# Patient Record
Sex: FEMALE | Race: WHITE | Employment: OTHER | URBAN - METROPOLITAN AREA
[De-identification: names, ages, dates, MRNs, and addresses within clinical notes are randomized per-mention and may not be internally consistent; named-entity substitution may affect disease eponyms.]

---

## 2017-12-22 ENCOUNTER — HOSPITAL ENCOUNTER (OUTPATIENT)
Dept: OTHER | Age: 37
Discharge: HOME | End: 2017-12-22
Attending: INTERNAL MEDICINE | Admitting: INTERNAL MEDICINE

## 2017-12-22 LAB
ALBUMIN SERPL-MCNC: 4.2 GM/DL (ref 3.4–5)
ALP BLD-CCNC: 71 IU/L (ref 40–129)
ALT SERPL-CCNC: 42 U/L (ref 10–40)
AST SERPL-CCNC: 27 IU/L (ref 15–37)
BILIRUB SERPL-MCNC: 0.3 MG/DL (ref 0–1)
BILIRUBIN DIRECT: 0.2 MG/DL (ref 0–0.3)
BILIRUBIN, INDIRECT: 0.1 MG/DL (ref 0–0.7)
HEPATITIS B SURFACE ANTIGEN: NON REACTIVE
HEPATITIS C ANTIBODY: ABNORMAL
TOTAL PROTEIN: 7.9 GM/DL (ref 6.4–8.2)

## 2017-12-24 LAB — HIV SCREEN: NON REACTIVE

## 2018-05-29 ENCOUNTER — HOSPITAL ENCOUNTER (OUTPATIENT)
Dept: OTHER | Age: 38
Discharge: HOME | End: 2018-05-29
Attending: EMERGENCY MEDICINE | Admitting: EMERGENCY MEDICINE

## 2018-06-02 LAB
CULTURE: NORMAL
REPORT STATUS: NORMAL
REQUEST PROBLEM: NORMAL
SPECIMEN: NORMAL

## 2018-06-25 ENCOUNTER — HOSPITAL ENCOUNTER (OUTPATIENT)
Dept: OTHER | Age: 38
Discharge: HOME | End: 2018-06-25
Attending: INTERNAL MEDICINE | Admitting: INTERNAL MEDICINE

## 2018-06-25 LAB
ALBUMIN SERPL-MCNC: 4.5 GM/DL (ref 3.4–5)
ALP BLD-CCNC: 70 IU/L (ref 40–129)
ALT SERPL-CCNC: 24 U/L (ref 10–40)
AST SERPL-CCNC: 24 IU/L (ref 15–37)
BILIRUB SERPL-MCNC: 0.4 MG/DL (ref 0–1)
BILIRUBIN DIRECT: 0.2 MG/DL (ref 0–0.3)
BILIRUBIN, INDIRECT: 0.2 MG/DL (ref 0–0.7)
HEPATITIS B SURFACE ANTIGEN: NON REACTIVE
HEPATITIS C ANTIBODY: ABNORMAL
TOTAL PROTEIN: 8.3 GM/DL (ref 6.4–8.2)

## 2018-06-26 LAB — HIV SCREEN: NON REACTIVE

## 2018-08-29 ENCOUNTER — HOSPITAL ENCOUNTER (OUTPATIENT)
Dept: WOUND CARE | Age: 38
Discharge: HOME | End: 2018-08-29
Attending: SURGERY | Admitting: SURGERY

## 2018-08-29 VITALS
WEIGHT: 212 LBS | HEART RATE: 79 BPM | DIASTOLIC BLOOD PRESSURE: 73 MMHG | BODY MASS INDEX: 40.02 KG/M2 | SYSTOLIC BLOOD PRESSURE: 119 MMHG | RESPIRATION RATE: 16 BRPM | HEIGHT: 61 IN | TEMPERATURE: 97.6 F

## 2018-08-29 DIAGNOSIS — S81.801A NON-HEALING WOUND OF LOWER EXTREMITY, RIGHT, INITIAL ENCOUNTER: ICD-10-CM

## 2018-08-29 DIAGNOSIS — S81.802A NON-HEALING WOUND OF LOWER EXTREMITY, LEFT, INITIAL ENCOUNTER: ICD-10-CM

## 2018-08-29 PROBLEM — S81.809A NON-HEALING WOUND OF LOWER EXTREMITY: Status: ACTIVE | Noted: 2018-08-29

## 2018-08-29 PROCEDURE — 99203 OFFICE O/P NEW LOW 30 MIN: CPT | Performed by: SURGERY

## 2018-08-29 RX ORDER — CLONIDINE HYDROCHLORIDE 0.1 MG/1
0.1 TABLET ORAL 2 TIMES DAILY
COMMUNITY

## 2018-08-29 RX ORDER — MIRTAZAPINE 15 MG/1
15 TABLET, FILM COATED ORAL NIGHTLY
COMMUNITY

## 2018-08-29 RX ORDER — NALTREXONE HYDROCHLORIDE 50 MG/1
50 TABLET, FILM COATED ORAL DAILY
COMMUNITY
End: 2020-01-09

## 2018-08-29 ASSESSMENT — PAIN DESCRIPTION - LOCATION: LOCATION: ANKLE;LEG

## 2018-08-29 ASSESSMENT — PAIN DESCRIPTION - FREQUENCY: FREQUENCY: INTERMITTENT

## 2018-08-29 ASSESSMENT — PAIN DESCRIPTION - PAIN TYPE: TYPE: CHRONIC PAIN

## 2018-08-29 ASSESSMENT — PAIN SCALES - GENERAL: PAINLEVEL_OUTOF10: 8

## 2018-08-29 ASSESSMENT — PAIN DESCRIPTION - ORIENTATION: ORIENTATION: RIGHT;LEFT

## 2018-08-29 NOTE — PROGRESS NOTES
Hives    Darvocet [Propoxyphene N-Acetaminophen] Hives    Asa [Aspirin] Hives       MEDICATIONS    Current Outpatient Prescriptions on File Prior to Encounter   Medication Sig Dispense Refill    naproxen (NAPROSYN) 500 MG tablet Take 1 tablet by mouth 2 times daily 15 tablet 0    acetaminophen (APAP EXTRA STRENGTH) 500 MG tablet Take 1 tablet by mouth every 6 hours as needed for Pain 40 tablet 0     No current facility-administered medications on file prior to encounter. PROBLEM LIST    Patient Active Problem List   Diagnosis    Pneumonia    Abnormal echocardiogram    Cocaine abuse    Opioid abuse    Right shoulder pain    Endocarditis    Non-healing wound of lower extremity    Non-healing wound of lower extremity       REVIEW OF SYSTEMS    Pertinent items are noted in HPI. Objective:      /73   Pulse 79   Temp 97.6 °F (36.4 °C) (Temporal)   Resp 16   Ht 5' 1\" (1.549 m)   Wt 212 lb (96.2 kg)   BMI 40.06 kg/m²     PHYSICAL EXAM    Dermatologic exam: Visual inspection of the periwound reveals the skin to be edematous  Wound exam: see wound description below in procedure note      Assessment:       Werner Russ  appears to have a non-healing wound of the lower legs. The etiology of the wound is felt to be non-healing/non-surgical. There are multiple complicating factors including edema. A comprehensive wound management program would be helpful to heal this wound. Assessments completed include fall risk and nutritional, functional,and psychological status. At this time appropriate care would include: periodic debridement and wound care as below.        Plan:     Discharge instructions:  See belwo     Treatment Note      Written Patient Dismissal Instructions Given            Electronically signed by Yolis Loredo MD on 8/29/2018 at 1:40 PM

## 2018-08-29 NOTE — PLAN OF CARE
Problem: Wound:  Intervention: Assess ankle, calf, or foot circumference blilaterally  See flowsheet

## 2018-09-05 ENCOUNTER — HOSPITAL ENCOUNTER (OUTPATIENT)
Dept: WOUND CARE | Age: 38
Discharge: HOME | End: 2018-09-05
Attending: NURSE PRACTITIONER | Admitting: NURSE PRACTITIONER

## 2018-09-05 VITALS
TEMPERATURE: 97.6 F | DIASTOLIC BLOOD PRESSURE: 79 MMHG | SYSTOLIC BLOOD PRESSURE: 117 MMHG | HEART RATE: 64 BPM | RESPIRATION RATE: 18 BRPM

## 2018-09-05 DIAGNOSIS — L97.912 CHRONIC ULCER OF RIGHT LOWER EXTREMITY WITH FAT LAYER EXPOSED (HCC): ICD-10-CM

## 2018-09-05 DIAGNOSIS — S81.801D NON-HEALING WOUND OF LOWER EXTREMITY, RIGHT, SUBSEQUENT ENCOUNTER: ICD-10-CM

## 2018-09-05 DIAGNOSIS — S81.802D NON-HEALING WOUND OF LOWER EXTREMITY, LEFT, SUBSEQUENT ENCOUNTER: Primary | ICD-10-CM

## 2018-09-05 DIAGNOSIS — L97.922 CHRONIC ULCER OF LEFT LOWER EXTREMITY WITH FAT LAYER EXPOSED (HCC): ICD-10-CM

## 2018-09-05 PROCEDURE — 99213 OFFICE O/P EST LOW 20 MIN: CPT | Performed by: NURSE PRACTITIONER

## 2018-09-05 ASSESSMENT — PAIN DESCRIPTION - ONSET: ONSET: ON-GOING

## 2018-09-05 ASSESSMENT — PAIN SCALES - GENERAL: PAINLEVEL_OUTOF10: 10

## 2018-09-05 ASSESSMENT — PAIN DESCRIPTION - FREQUENCY: FREQUENCY: CONTINUOUS

## 2018-09-05 ASSESSMENT — PAIN DESCRIPTION - PROGRESSION: CLINICAL_PROGRESSION: NOT CHANGED

## 2018-09-05 ASSESSMENT — PAIN DESCRIPTION - ORIENTATION: ORIENTATION: LEFT;RIGHT

## 2018-09-05 ASSESSMENT — PAIN DESCRIPTION - LOCATION: LOCATION: LEG

## 2018-09-05 ASSESSMENT — PAIN DESCRIPTION - PAIN TYPE: TYPE: CHRONIC PAIN

## 2018-09-05 ASSESSMENT — PAIN DESCRIPTION - DESCRIPTORS: DESCRIPTORS: ACHING;SHOOTING;THROBBING

## 2018-09-05 NOTE — PROGRESS NOTES
Tunneling Position ___ O'Clock 00 9/5/2018 12:34 PM   Undermining Starts ___ O'Clock 0 9/5/2018 12:34 PM   Undermining Ends___ O'Clock 0 9/5/2018 12:34 PM   Undermining Maxium Distance (cm) 0 9/5/2018 12:34 PM   Wound Assessment Red;Yellow 9/5/2018 12:34 PM   Drainage Amount Moderate 9/5/2018 12:34 PM   Drainage Description Serosanguinous 9/5/2018 12:34 PM   Odor None 9/5/2018 12:34 PM   Margins Defined edges 9/5/2018 12:34 PM   Janet-wound Assessment Clean 9/5/2018 12:34 PM   Non-staged Wound Description Full thickness 9/5/2018 12:34 PM   [place]%Wound Bed 0 9/5/2018 12:34 PM   Red%Wound Bed 50 9/5/2018 12:34 PM   Yellow%Wound Bed 50 9/5/2018 12:34 PM   Black%Wound Bed 0 9/5/2018 12:34 PM   Purple%Wound Bed 0 9/5/2018 12:34 PM   Other%Wound Bed 0 9/5/2018 12:34 PM   Number of days: 7       Wound 08/29/18 #2 RIght medial leg proximal(onset april 2018) (Active)   Wound Image   8/29/2018  1:12 PM   Wound Type Wound 9/5/2018 12:34 PM   Wound Traumatic 8/29/2018  1:12 PM   Dressing Status Clean;Dry; Intact 9/5/2018  1:34 PM   Dressing Changed Changed/New 9/5/2018  1:34 PM   Wound Cleansed Rinsed/Irrigated with saline 9/5/2018 12:34 PM   Wound Length (cm) 0.2 cm 9/5/2018 12:34 PM   Wound Width (cm) 0.2 cm 9/5/2018 12:34 PM   Wound Depth (cm)  0.1 9/5/2018 12:34 PM   Calculated Wound Size (cm^2) (l*w) 0.04 cm^2 9/5/2018 12:34 PM   Change in Wound Size % (l*w) 95.6 9/5/2018 12:34 PM   Distance Tunneling (cm) 0 cm 9/5/2018 12:34 PM   Tunneling Position ___ O'Clock 0 9/5/2018 12:34 PM   Undermining Starts ___ O'Clock 00 9/5/2018 12:34 PM   Undermining Ends___ O'Clock 0 9/5/2018 12:34 PM   Undermining Maxium Distance (cm) 0 9/5/2018 12:34 PM   Wound Assessment Red 9/5/2018 12:34 PM   Drainage Amount None 9/5/2018 12:34 PM   Drainage Description Serosanguinous; Yellow 8/29/2018  1:12 PM   Odor None 9/5/2018 12:34 PM   Margins Attached edges 9/5/2018 12:34 PM   Janet-wound Assessment Clean 9/5/2018 12:34 PM   Non-staged Wound 9/5/2018  1:34 PM   Dressing Changed Changed/New 9/5/2018  1:34 PM   Wound Cleansed Rinsed/Irrigated with saline 9/5/2018 12:34 PM   Wound Length (cm) 0.1 cm 9/5/2018 12:34 PM   Wound Width (cm) 0.2 cm 9/5/2018 12:34 PM   Wound Depth (cm)  0.1 9/5/2018 12:34 PM   Calculated Wound Size (cm^2) (l*w) 0.02 cm^2 9/5/2018 12:34 PM   Change in Wound Size % (l*w) 92.86 9/5/2018 12:34 PM   Distance Tunneling (cm) 0 cm 9/5/2018 12:34 PM   Tunneling Position ___ O'Clock 0 9/5/2018 12:34 PM   Undermining Starts ___ O'Clock 0 9/5/2018 12:34 PM   Undermining Ends___ O'Clock 0 9/5/2018 12:34 PM   Undermining Maxium Distance (cm) 0 9/5/2018 12:34 PM   Wound Assessment Yellow 9/5/2018 12:34 PM   Drainage Amount None 9/5/2018 12:34 PM   Drainage Description DARCY 9/5/2018 12:34 PM   Odor None 9/5/2018 12:34 PM   Margins Defined edges 9/5/2018 12:34 PM   Janet-wound Assessment Pink 9/5/2018 12:34 PM   Non-staged Wound Description Full thickness 9/5/2018 12:34 PM   [place]%Wound Bed 0 9/5/2018 12:34 PM   Red%Wound Bed 0 9/5/2018 12:34 PM   Yellow%Wound Bed 100 9/5/2018 12:34 PM   Black%Wound Bed 0 9/5/2018 12:34 PM   Purple%Wound Bed 0 9/5/2018 12:34 PM   Other%Wound Bed 0 9/5/2018 12:34 PM   Number of days: 7       Incision 02/27/15 Shoulder Right (Active)   Number of days: 1285       Assessment:     Problem List Items Addressed This Visit     Non-healing wound of lower extremity - Primary    Non-healing wound of lower extremity    WD-Chronic ulcer of right lower extremity with fat layer exposed ([address]    Relevant Orders    Culture, Generic    WD-Chronic ulcer of left lower extremity with fat layer exposed ([address]          Status of wound progress and description from last visit:   Slightly smaller in size. Plan:     Discharge Instructions            PHYSICIAN ORDERS AND DISCHARGE INSTRUCTIONS     NOTE: Upon discharge from the [address], you will receive a patient experience survey.  We would be grateful if you would take the time to fill this survey out.     Wound care order history:                 NASH's   Right      Left    Date               Vascular studies:   Date               Imaging:   Date               Cultures:   Date 7/13/18 9/5/18              Labs/ HbA1c:   Date               Grafts:  Date               HBO:               Antibiotics:               Earlier Wound care treatments:                Authorizations:                Consults:   Date                           Primary care physician:      Continuing wound care orders and information:              Residence: HOME              Continue home health care with:               Your wound-care supplies will be provided by: . HARRISON OREILLY ORDERED 8/29/18 HAS SUPPLIES              DME provider:              Compression with              Off loading:  Date               Wound Medications:              Wound cleansing:                           Do not scrub or use excessive force. Wash hands with soap and water before and after dressing changes. Prior to applying a clean dressing, cleanse wound with normal saline,                                wound cleanser, or mild soap and water. Ask the physician or nurse before getting the wound(s) wet in a shower              Daily Wound management:                          Keep weight off wounds and reposition every 2 hours. Avoid standing for long periods of time. Apply wraps/stockings in AM and remove at bedtime. If swelling is present, elevate legs to the level of the heart or above for 30                              minutes 4-5 times a day and/or when sitting.                                              When taking antibiotics take entire prescription as ordered by physician                             do not stop taking until medicine is all gone.

## 2018-09-09 LAB
CULTURE: NORMAL
CULTURE: NORMAL
Lab: NORMAL
ORGANISM: NORMAL
REPORT STATUS: NORMAL
SPECIMEN: NORMAL

## 2018-09-12 ENCOUNTER — HOSPITAL ENCOUNTER (OUTPATIENT)
Dept: WOUND CARE | Age: 38
Discharge: HOME | End: 2018-09-12
Attending: NURSE PRACTITIONER | Admitting: NURSE PRACTITIONER

## 2018-09-12 VITALS
HEART RATE: 73 BPM | SYSTOLIC BLOOD PRESSURE: 132 MMHG | TEMPERATURE: 97.3 F | DIASTOLIC BLOOD PRESSURE: 91 MMHG | RESPIRATION RATE: 20 BRPM

## 2018-09-12 DIAGNOSIS — L97.912 CHRONIC ULCER OF RIGHT LOWER EXTREMITY WITH FAT LAYER EXPOSED (HCC): Primary | ICD-10-CM

## 2018-09-12 DIAGNOSIS — L97.922 CHRONIC ULCER OF LEFT LOWER EXTREMITY WITH FAT LAYER EXPOSED (HCC): ICD-10-CM

## 2018-09-12 PROCEDURE — 99213 OFFICE O/P EST LOW 20 MIN: CPT | Performed by: NURSE PRACTITIONER

## 2018-09-12 RX ORDER — SULFAMETHOXAZOLE AND TRIMETHOPRIM 800; 160 MG/1; MG/1
1 TABLET ORAL 2 TIMES DAILY
Qty: 20 TABLET | Refills: 0 | Status: SHIPPED | OUTPATIENT
Start: 2018-09-12 | End: 2018-09-22

## 2018-09-12 ASSESSMENT — PAIN DESCRIPTION - LOCATION: LOCATION: LEG

## 2018-09-12 ASSESSMENT — PAIN DESCRIPTION - FREQUENCY: FREQUENCY: CONTINUOUS

## 2018-09-12 ASSESSMENT — PAIN DESCRIPTION - ORIENTATION: ORIENTATION: RIGHT

## 2018-09-12 ASSESSMENT — PAIN DESCRIPTION - ONSET: ONSET: ON-GOING

## 2018-09-12 ASSESSMENT — PAIN DESCRIPTION - PAIN TYPE: TYPE: ACUTE PAIN

## 2018-09-12 ASSESSMENT — PAIN DESCRIPTION - DESCRIPTORS: DESCRIPTORS: SHARP;DULL

## 2018-09-12 ASSESSMENT — PAIN DESCRIPTION - PROGRESSION: CLINICAL_PROGRESSION: NOT CHANGED

## 2018-09-12 ASSESSMENT — PAIN SCALES - GENERAL: PAINLEVEL_OUTOF10: 10

## 2018-09-12 NOTE — PROGRESS NOTES
Wound Care Center Progress Note       Brantley Councilman  AGE: 41 y.o. GENDER: female  : 1980  TODAY'S DATE:  2018        Subjective:     Chief Complaint   Patient presents with    Wound Check     ble          HISTORY of PRESENT ILLNESS     Brantley Councilman is a 41 y.o. female who presents today for wound evaluation of Chronic non-healing/non-surgical wound(s) of bilateral lower legs. The wound is of moderate severity. 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 Wounds extend to fat layer. Wound culture from 18 with moderate growth staph. Will start on Bactrim per sensitivity report. All wounds are healed except for right medial ankle.   Wound Pain Timing/Severity: intermittent  Quality of pain: tender  Severity of pain:  3 / 10   Modifying Factors: edema and obesity  Associated Signs/Symptoms: pain        PAST MEDICAL HISTORY        Diagnosis Date    Hep C w/o coma, chronic (HCC)     Heroin dependence ([address]     MRSA infection        PAST SURGICAL HISTORY    Past Surgical History:   Procedure Laterality Date    CHOLECYSTECTOMY      CYST INCISION AND DRAINAGE      numerous places 6 different areas    OTHER SURGICAL HISTORY  02/27/15    I&D of R shoulder AC joint    SHOULDER SURGERY Right     TOE SURGERY      were amputated in accident and sewn back on    TONSILLECTOMY         FAMILY HISTORY    Family History   Problem Relation Age of Onset    Heart Disease Mother     Asthma Mother     Cancer Maternal Grandmother     Heart Disease Maternal Grandmother     Asthma Maternal Grandmother        SOCIAL HISTORY    Social History   Substance Use Topics    Smoking status: Current Every Day Smoker     Packs/day: 1.00     Types: Cigarettes    Smokeless tobacco: Never Used    Alcohol use No       ALLERGIES    Allergies   Allergen Reactions    Latex Hives    Darvocet [Propoxyphene N-Acetaminophen] Hives    Asa [Aspirin] Hives       MEDICATIONS    Current Outpatient Prescriptions on File Prior to Encounter   Medication Sig Dispense Refill    cloNIDine (CATAPRES) 0.1 MG tablet Take 0.1 mg by mouth 2 times daily      mirtazapine (REMERON) 15 MG tablet Take 15 mg by mouth nightly      naltrexone (DEPADE) 50 MG tablet Take 50 mg by mouth daily      Naltrexone (VIVITROL IM) Inject into the muscle every 30 days      naproxen (NAPROSYN) 500 MG tablet Take 1 tablet by mouth 2 times daily 15 tablet 0    acetaminophen (APAP EXTRA STRENGTH) 500 MG tablet Take 1 tablet by mouth every 6 hours as needed for Pain 40 tablet 0     No current facility-administered medications on file prior to encounter. REVIEW OF SYSTEMS    Pertinent items are noted in HPI. Constitutional: Negative for systemic symptoms including fever, chills and malaise. Objective:      BP (!) 132/91   Pulse 73   Temp 97.3 °F (36.3 °C) (Temporal)   Resp 20     PHYSICAL EXAM    General: The patient is in no acute distress. Mental status:  Patient is appropriate, is  oriented to place and plan of care. Dermatologic exam: Visual inspection of the periwound reveals the skin to be edematous. Wound exam:  see wound description below     All active wounds listed below with today's date are evaluated      Wound 08/29/18 #1 right medial ankle(onset april 2018) trauma (Active)   Wound Image   9/12/2018 12:30 PM   Wound Type Wound 9/12/2018 12:30 PM   Wound Traumatic 9/12/2018 12:30 PM   Dressing Status Clean;Dry; Intact 9/5/2018  1:34 PM   Dressing Changed Changed/New 9/5/2018  1:34 PM   Wound Cleansed Rinsed/Irrigated with saline 9/12/2018 12:30 PM   Wound Length (cm) 2.4 cm 9/12/2018 12:30 PM   Wound Width (cm) 1.5 cm 9/12/2018 12:30 PM   Wound Depth (cm)  0.1 9/12/2018 12:30 PM   Calculated Wound Size (cm^2) (l*w) 3.6 cm^2 9/12/2018 12:30 PM   Change in Wound Size % (l*w) 47.83 9/12/2018 12:30 PM   Distance Tunneling (cm) 0 cm 9/12/2018 12:30 PM   Tunneling Position ___ O'Clock 0 9/12/2018 12:30 PM 1:34 PM   Dressing Changed Changed/New 9/5/2018  1:34 PM   Wound Cleansed Rinsed/Irrigated with saline 9/12/2018 12:30 PM   Wound Length (cm) 0 cm 9/12/2018 12:30 PM   Wound Width (cm) 0 cm 9/12/2018 12:30 PM   Wound Depth (cm)  0 9/12/2018 12:30 PM   Calculated Wound Size (cm^2) (l*w) 0 cm^2 9/12/2018 12:30 PM   Change in Wound Size % (l*w) 100 9/12/2018 12:30 PM   Distance Tunneling (cm) 0 cm 9/12/2018 12:30 PM   Tunneling Position ___ O'Clock 0 9/12/2018 12:30 PM   Undermining Starts ___ O'Clock 0 9/12/2018 12:30 PM   Undermining Ends___ O'Clock 0 9/12/2018 12:30 PM   Undermining Maxium Distance (cm) 0 9/12/2018 12:30 PM   Wound Assessment Pink 9/12/2018 12:30 PM   Drainage Amount None 9/12/2018 12:30 PM   Drainage Description DARCY 9/5/2018 12:34 PM   Odor None 9/12/2018 12:30 PM   Margins Attached edges 9/12/2018 12:30 PM   Janet-wound Assessment Clean;Dry; Intact 9/12/2018 12:30 PM   Non-staged Wound Description Not applicable 3/07/1084 61:87 PM   Melissa%Wound Bed 100 9/12/2018 12:30 PM   Red%Wound Bed 0 9/12/2018 12:30 PM   Yellow%Wound Bed 0 9/12/2018 12:30 PM   Black%Wound Bed 0 9/12/2018 12:30 PM   Purple%Wound Bed 0 9/12/2018 12:30 PM   Other%Wound Bed 0 9/12/2018 12:30 PM   Number of days: 13       Incision 02/27/15 Shoulder Right (Active)   Number of days: 1292       Assessment:       Problem List Items Addressed This Visit     WD-Chronic ulcer of right lower extremity with fat layer exposed ([facility] - Primary    WD-Chronic ulcer of left lower extremity with fat layer exposed ([address]          Status of wound progress and description from last visit:  Improved        Plan:     Discharge Instructions         Instructions               PHYSICIAN ORDERS AND DISCHARGE INSTRUCTIONS     NOTE: Upon discharge from the [address], you will receive a patient experience survey.  We would be grateful if you would take the time to fill this survey out.     Wound care order history:                 NASH's   Right      Left week:  9/12/18    TAKE IBUPROFEN AND TYLENOL FOR PAIN     ORDER FOR LABS HEMOGLOBIN A1C GIVEN ON 9/5/18         RIGHT LOWER LEG- CLEAN WITH SOAP AND WATER. DRY WOUNDS APPLY ZANDRA ABD KERLIX TAPE  TUBIGRIP F CHANGE DRESSINGS DAILY   ( AT HOME USE STIMULEN POWDER AND ZANDRA )       LEFT LOWER LEG WOUND, RIGHT POSTERIOR LEG AND RIGHT  MEDIAL LEG WOUNDS- 9/12/18 TAKE HEALED PHOTO  APPLY TUBIGRIP F DAILY            Follow up with Dr Clarita Johns [facility] in the wound care center  Call [phone] for any questions or concerns.   Date__________   Time____________                       Treatment Note      Written Patient Dismissal Instructions Given            Electronically signed by REMY Pearce CNP on 9/12/2018 at 12:57 PM

## 2018-11-06 ENCOUNTER — HOSPITAL ENCOUNTER (EMERGENCY)
Age: 38
Discharge: HOME | End: 2018-11-06
Payer: MEDICAID

## 2018-11-06 VITALS
BODY MASS INDEX: 42.1 KG/M2 | RESPIRATION RATE: 18 BRPM | HEART RATE: 87 BPM | DIASTOLIC BLOOD PRESSURE: 84 MMHG | OXYGEN SATURATION: 99 % | HEIGHT: 61 IN | SYSTOLIC BLOOD PRESSURE: 136 MMHG | TEMPERATURE: 98 F | WEIGHT: 223 LBS

## 2018-11-06 DIAGNOSIS — R51.9 NONINTRACTABLE HEADACHE, UNSPECIFIED CHRONICITY PATTERN, UNSPECIFIED HEADACHE TYPE: Primary | ICD-10-CM

## 2018-11-06 DIAGNOSIS — R10.9 ABDOMINAL PAIN, UNSPECIFIED ABDOMINAL LOCATION: ICD-10-CM

## 2018-11-06 LAB
ALBUMIN SERPL-MCNC: 3.5 GM/DL (ref 3.4–5)
ALP BLD-CCNC: 59 IU/L (ref 40–129)
ALT SERPL-CCNC: 17 U/L (ref 10–40)
AMORPHOUS: ABNORMAL /HPF
ANION GAP SERPL CALCULATED.3IONS-SCNC: 10 MMOL/L (ref 4–16)
AST SERPL-CCNC: 14 IU/L (ref 15–37)
BACTERIA: ABNORMAL /HPF
BASOPHILS ABSOLUTE: 0 K/CU MM
BASOPHILS RELATIVE PERCENT: 0.4 % (ref 0–1)
BILIRUB SERPL-MCNC: 0.6 MG/DL (ref 0–1)
BILIRUBIN URINE: NEGATIVE MG/DL
BLOOD, URINE: NEGATIVE
BUN BLDV-MCNC: 17 MG/DL (ref 6–23)
CALCIUM SERPL-MCNC: 9 MG/DL (ref 8.3–10.6)
CHLORIDE BLD-SCNC: 103 MMOL/L (ref 99–110)
CLARITY: ABNORMAL
CO2: 26 MMOL/L (ref 21–32)
COLOR: YELLOW
CREAT SERPL-MCNC: 1 MG/DL (ref 0.6–1.1)
DIFFERENTIAL TYPE: ABNORMAL
EOSINOPHILS ABSOLUTE: 0.3 K/CU MM
EOSINOPHILS RELATIVE PERCENT: 2.7 % (ref 0–3)
GFR AFRICAN AMERICAN: >60 ML/MIN/1.73M2
GFR NON-AFRICAN AMERICAN: >60 ML/MIN/1.73M2
GLUCOSE BLD-MCNC: 91 MG/DL (ref 70–99)
GLUCOSE, URINE: NEGATIVE MG/DL
HCG QUALITATIVE: NEGATIVE
HCT VFR BLD CALC: 40.8 % (ref 37–47)
HEMOGLOBIN: 13.2 GM/DL (ref 12.5–16)
IMMATURE NEUTROPHIL %: 0.4 % (ref 0–0.43)
KETONES, URINE: NEGATIVE MG/DL
LEUKOCYTE ESTERASE, URINE: ABNORMAL
LYMPHOCYTES ABSOLUTE: 1.9 K/CU MM
LYMPHOCYTES RELATIVE PERCENT: 17.9 % (ref 24–44)
MCH RBC QN AUTO: 30.6 PG (ref 27–31)
MCHC RBC AUTO-ENTMCNC: 32.4 % (ref 32–36)
MCV RBC AUTO: 94.4 FL (ref 78–100)
MONOCYTES ABSOLUTE: 0.7 K/CU MM
MONOCYTES RELATIVE PERCENT: 6.3 % (ref 0–4)
MUCUS: ABNORMAL HPF
NITRITE URINE, QUANTITATIVE: NEGATIVE
NUCLEATED RBC %: 0 %
PDW BLD-RTO: 13.1 % (ref 11.7–14.9)
PH, URINE: 7 (ref 5–8)
PLATELET # BLD: 223 K/CU MM (ref 140–440)
PMV BLD AUTO: 9.4 FL (ref 7.5–11.1)
POTASSIUM SERPL-SCNC: 4.3 MMOL/L (ref 3.5–5.1)
PROTEIN UA: NEGATIVE MG/DL
RBC # BLD: 4.32 M/CU MM (ref 4.2–5.4)
RBC URINE: 2 /HPF (ref 0–6)
SEGMENTED NEUTROPHILS ABSOLUTE COUNT: 7.5 K/CU MM
SEGMENTED NEUTROPHILS RELATIVE PERCENT: 72.3 % (ref 36–66)
SODIUM BLD-SCNC: 139 MMOL/L (ref 135–145)
SPECIFIC GRAVITY UA: 1.02 (ref 1–1.03)
SQUAMOUS EPITHELIAL: 8 /HPF
TOTAL IMMATURE NEUTOROPHIL: 0.04 K/CU MM
TOTAL NUCLEATED RBC: 0 K/CU MM
TOTAL PROTEIN: 7.6 GM/DL (ref 6.4–8.2)
TRICHOMONAS: ABNORMAL /HPF
UROBILINOGEN, URINE: NORMAL MG/DL (ref 0.2–1)
WBC # BLD: 10.4 K/CU MM (ref 4–10.5)
WBC UA: 1 /HPF (ref 0–5)

## 2018-11-06 PROCEDURE — 80053 COMPREHEN METABOLIC PANEL: CPT

## 2018-11-06 PROCEDURE — 96372 THER/PROPH/DIAG INJ SC/IM: CPT

## 2018-11-06 PROCEDURE — 81001 URINALYSIS AUTO W/SCOPE: CPT

## 2018-11-06 PROCEDURE — 6370000000 HC RX 637 (ALT 250 FOR IP): Performed by: PHYSICIAN ASSISTANT

## 2018-11-06 PROCEDURE — 36415 COLL VENOUS BLD VENIPUNCTURE: CPT

## 2018-11-06 PROCEDURE — 99284 EMERGENCY DEPT VISIT MOD MDM: CPT

## 2018-11-06 PROCEDURE — 6360000002 HC RX W HCPCS: Performed by: PHYSICIAN ASSISTANT

## 2018-11-06 PROCEDURE — 85025 COMPLETE CBC W/AUTO DIFF WBC: CPT

## 2018-11-06 PROCEDURE — 84703 CHORIONIC GONADOTROPIN ASSAY: CPT

## 2018-11-06 RX ORDER — DIPHENHYDRAMINE HCL 25 MG
50 TABLET ORAL ONCE
Status: COMPLETED | OUTPATIENT
Start: 2018-11-06 | End: 2018-11-06

## 2018-11-06 RX ORDER — METOCLOPRAMIDE HYDROCHLORIDE 5 MG/ML
10 INJECTION INTRAMUSCULAR; INTRAVENOUS ONCE
Status: COMPLETED | OUTPATIENT
Start: 2018-11-06 | End: 2018-11-06

## 2018-11-06 RX ORDER — BUTALBITAL, ACETAMINOPHEN AND CAFFEINE 300; 40; 50 MG/1; MG/1; MG/1
1 CAPSULE ORAL EVERY 6 HOURS PRN
Qty: 12 CAPSULE | Refills: 0 | Status: SHIPPED | OUTPATIENT
Start: 2018-11-06 | End: 2019-06-20

## 2018-11-06 RX ADMIN — DIPHENHYDRAMINE HCL 50 MG: 25 TABLET ORAL at 13:36

## 2018-11-06 RX ADMIN — METOCLOPRAMIDE 10 MG: 5 INJECTION, SOLUTION INTRAMUSCULAR; INTRAVENOUS at 13:36

## 2018-11-06 ASSESSMENT — PAIN SCALES - GENERAL
PAINLEVEL_OUTOF10: 6
PAINLEVEL_OUTOF10: 10

## 2018-11-06 ASSESSMENT — PAIN DESCRIPTION - PAIN TYPE: TYPE: ACUTE PAIN

## 2018-11-06 ASSESSMENT — PAIN DESCRIPTION - LOCATION: LOCATION: HEAD

## 2018-11-06 NOTE — ED PROVIDER NOTES
eMERGENCY dEPARTMENT eNCOUnter      PCP: Arlyn Sharpe MD    CHIEF COMPLAINT    Chief Complaint   Patient presents with    Headache     x 2 days    Abdominal Pain     sharp x 3 days       HPI    Ryan Rhodes is a 41 y.o. female who arrives to the ED today via POV with gradual onset of a headache occurring at rest since onset 2 days. .   Pain is localized in the Frontal of the head. The duration has been since onset, with varying degrees of severity. The quality of the headache is achy. This headache is not the worst headache of the patient's life. The patient has no associated vomiting, neck pain or stiffness, loss of consciousness, altered mentation (confusion, memory loss, hallucinations, vision changes), weakness or sensory loss. Patient has a history of headaches, greater than 3 headaches in the last 6 months, and denies any new symptoms or severity of symptoms different from previous headaches. Patient also complains of right upper quadrant abdominal pain. Onset 4-5 days. Context is she notes insidious onset of episodic pain described as achy. No known aggravating or alleviating factors. No chest pain or shortness of breath. No change in nature with food intake. No vomiting, diarrhea. No urinary symptoms. Denies pregnancy. REVIEW OF SYSTEMS   Constitutional:  Denies fever, chills, weight loss or weakness   Neurologic:  See HPI. Denies lightheadedness, dizziness. Eyes:  See HPI. Denies discharge, diplopia, blurred vision, or loss visual field  HENT:  Denies sore throat or ear pain   GI  see HPI. :  Denies Dysuria or Hematuria. Adamantly denies pregnancy  Musculoskeletal:  Denies back pain.      Skin:  Denies rash   Endocrine:  Denies polyuria or polydypsia   Lymphatic:  Denies swollen glands   Psychiatric:  Denies depression, suicidal ideation or homicidal ideation     All other review of systems negative at this time  See HPI and nursing notes for additional 32.4 32.0 - 36.0 %    RDW 13.1 11.7 - 14.9 %    Platelets 510 848 - 652 K/CU MM    MPV 9.4 7.5 - 11.1 FL    Differential Type AUTOMATED DIFFERENTIAL     Segs Relative 72.3 (H) 36 - 66 %    Lymphocytes % 17.9 (L) 24 - 44 %    Monocytes % 6.3 (H) 0 - 4 %    Eosinophils % 2.7 0 - 3 %    Basophils % 0.4 0 - 1 %    Segs Absolute 7.5 K/CU MM    Lymphocytes # 1.9 K/CU MM    Monocytes # 0.7 K/CU MM    Eosinophils # 0.3 K/CU MM    Basophils # 0.0 K/CU MM    Nucleated RBC % 0.0 %    Total Nucleated RBC 0.0 K/CU MM    Total Immature Neutrophil 0.04 K/CU MM    Immature Neutrophil % 0.4 0 - 0.43 %   Comprehensive Metabolic Panel   Result Value Ref Range    Sodium 139 135 - 145 MMOL/L    Potassium 4.3 3.5 - 5.1 MMOL/L    Chloride 103 99 - 110 mMol/L    CO2 26 21 - 32 MMOL/L    BUN 17 6 - 23 MG/DL    CREATININE 1.0 0.6 - 1.1 MG/DL    Glucose 91 70 - 99 MG/DL    Calcium 9.0 8.3 - 10.6 MG/DL    Alb 3.5 3.4 - 5.0 GM/DL    Total Protein 7.6 6.4 - 8.2 GM/DL    Total Bilirubin 0.6 0.0 - 1.0 MG/DL    ALT 17 10 - 40 U/L    AST 14 (L) 15 - 37 IU/L    Alkaline Phosphatase 59 40 - 129 IU/L    GFR Non-African American >60 >60 mL/min/1.73m2    GFR African American >60 >60 mL/min/1.73m2    Anion Gap 10 4 - 16   HCG Qualitative, Serum   Result Value Ref Range    hCG Qual NEGATIVE    Urinalysis   Result Value Ref Range    Color, UA YELLOW UYELL    Clarity, UA HAZY (A) CLEAR    Glucose, Urine NEGATIVE NEG MG/DL    Bilirubin Urine NEGATIVE NEG MG/DL    Ketones, Urine NEGATIVE NEG MG/DL    Specific Gravity, UA 1.017 1.001 - 1.035    Blood, Urine NEGATIVE NEG    pH, Urine 7.0 5.0 - 8.0    Protein, UA NEGATIVE NEG MG/DL    Urobilinogen, Urine NORMAL 0.2 - 1.0 MG/DL    Nitrite Urine, Quantitative NEGATIVE NEG    Leukocyte Esterase, Urine TRACE (A) NEG    RBC, UA 2 0 - 6 /HPF    WBC, UA 1 0 - 5 /HPF    Bacteria, UA RARE (A) NEG /HPF    Squam Epithel, UA 8 /HPF    Mucus, UA RARE (A) NEG HPF    Trichomonas, UA NONE SEEN NOSEE /HPF    Amorphous, UA RARE

## 2019-01-10 ENCOUNTER — HOSPITAL ENCOUNTER (OUTPATIENT)
Dept: NEUROLOGY | Age: 39
Discharge: HOME | End: 2019-01-10
Payer: MEDICAID

## 2019-01-10 PROCEDURE — 95861 NEEDLE EMG 2 EXTREMITIES: CPT

## 2019-03-05 ASSESSMENT — PAIN DESCRIPTION - ORIENTATION: ORIENTATION: RIGHT;LEFT

## 2019-03-05 ASSESSMENT — PAIN SCALES - GENERAL: PAINLEVEL_OUTOF10: 10

## 2019-03-05 ASSESSMENT — PAIN DESCRIPTION - LOCATION: LOCATION: EAR

## 2019-03-05 ASSESSMENT — PAIN DESCRIPTION - PAIN TYPE: TYPE: ACUTE PAIN

## 2019-03-06 ENCOUNTER — HOSPITAL ENCOUNTER (EMERGENCY)
Age: 39
Discharge: HOME | End: 2019-03-06
Payer: MEDICAID

## 2019-03-06 VITALS
OXYGEN SATURATION: 95 % | SYSTOLIC BLOOD PRESSURE: 121 MMHG | BODY MASS INDEX: 44.17 KG/M2 | DIASTOLIC BLOOD PRESSURE: 62 MMHG | RESPIRATION RATE: 18 BRPM | TEMPERATURE: 98.4 F | HEART RATE: 93 BPM | HEIGHT: 60 IN | WEIGHT: 225 LBS

## 2019-03-06 DIAGNOSIS — G43.909 MIGRAINE WITHOUT STATUS MIGRAINOSUS, NOT INTRACTABLE, UNSPECIFIED MIGRAINE TYPE: Primary | ICD-10-CM

## 2019-03-06 DIAGNOSIS — Z86.19 H/O TRICHOMONAL VAGINITIS: ICD-10-CM

## 2019-03-06 PROCEDURE — 6370000000 HC RX 637 (ALT 250 FOR IP): Performed by: PHYSICIAN ASSISTANT

## 2019-03-06 PROCEDURE — 2580000003 HC RX 258: Performed by: PHYSICIAN ASSISTANT

## 2019-03-06 PROCEDURE — 6360000002 HC RX W HCPCS: Performed by: PHYSICIAN ASSISTANT

## 2019-03-06 PROCEDURE — 99283 EMERGENCY DEPT VISIT LOW MDM: CPT

## 2019-03-06 PROCEDURE — 96375 TX/PRO/DX INJ NEW DRUG ADDON: CPT

## 2019-03-06 PROCEDURE — 96374 THER/PROPH/DIAG INJ IV PUSH: CPT

## 2019-03-06 RX ORDER — LORAZEPAM 2 MG/ML
1 INJECTION INTRAMUSCULAR ONCE
Status: DISCONTINUED | OUTPATIENT
Start: 2019-03-06 | End: 2019-03-06

## 2019-03-06 RX ORDER — KETOROLAC TROMETHAMINE 30 MG/ML
30 INJECTION, SOLUTION INTRAMUSCULAR; INTRAVENOUS ONCE
Status: COMPLETED | OUTPATIENT
Start: 2019-03-06 | End: 2019-03-06

## 2019-03-06 RX ORDER — BUTALBITAL, ACETAMINOPHEN AND CAFFEINE 50; 325; 40 MG/1; MG/1; MG/1
2 TABLET ORAL ONCE
Status: COMPLETED | OUTPATIENT
Start: 2019-03-06 | End: 2019-03-06

## 2019-03-06 RX ORDER — DIPHENHYDRAMINE HCL 25 MG
50 TABLET ORAL ONCE
Status: COMPLETED | OUTPATIENT
Start: 2019-03-06 | End: 2019-03-06

## 2019-03-06 RX ORDER — KETOROLAC TROMETHAMINE 30 MG/ML
60 INJECTION, SOLUTION INTRAMUSCULAR; INTRAVENOUS ONCE
Status: DISCONTINUED | OUTPATIENT
Start: 2019-03-06 | End: 2019-03-06

## 2019-03-06 RX ORDER — 0.9 % SODIUM CHLORIDE 0.9 %
1000 INTRAVENOUS SOLUTION INTRAVENOUS ONCE
Status: COMPLETED | OUTPATIENT
Start: 2019-03-06 | End: 2019-03-06

## 2019-03-06 RX ORDER — METRONIDAZOLE 500 MG/1
500 TABLET ORAL 3 TIMES DAILY
Qty: 21 TABLET | Refills: 0 | Status: SHIPPED | OUTPATIENT
Start: 2019-03-06 | End: 2019-03-13

## 2019-03-06 RX ADMIN — KETOROLAC TROMETHAMINE 30 MG: 30 INJECTION, SOLUTION INTRAMUSCULAR at 03:07

## 2019-03-06 RX ADMIN — SODIUM CHLORIDE 1000 ML: 9 INJECTION, SOLUTION INTRAVENOUS at 03:08

## 2019-03-06 RX ADMIN — PROCHLORPERAZINE EDISYLATE 10 MG: 5 INJECTION INTRAMUSCULAR; INTRAVENOUS at 03:07

## 2019-03-06 RX ADMIN — BUTALBITAL, ACETAMINOPHEN, AND CAFFEINE 2 TABLET: 50; 325; 40 TABLET ORAL at 03:08

## 2019-03-06 RX ADMIN — DIPHENHYDRAMINE HCL 50 MG: 25 TABLET ORAL at 03:08

## 2019-03-06 ASSESSMENT — PAIN SCALES - GENERAL
PAINLEVEL_OUTOF10: 10
PAINLEVEL_OUTOF10: 0

## 2019-04-19 ENCOUNTER — HOSPITAL ENCOUNTER (OUTPATIENT)
Dept: PSYCHIATRY | Age: 39
Setting detail: THERAPIES SERIES
Discharge: HOME | End: 2019-04-19
Payer: MEDICAID

## 2019-04-19 PROCEDURE — 80305 DRUG TEST PRSMV DIR OPT OBS: CPT

## 2019-04-19 PROCEDURE — 90791 PSYCH DIAGNOSTIC EVALUATION: CPT

## 2019-04-26 ENCOUNTER — HOSPITAL ENCOUNTER (OUTPATIENT)
Dept: PSYCHIATRY | Age: 39
Setting detail: THERAPIES SERIES
Discharge: HOME | End: 2019-04-26
Payer: MEDICAID

## 2019-04-26 PROCEDURE — 90834 PSYTX W PT 45 MINUTES: CPT

## 2019-05-01 ENCOUNTER — HOSPITAL ENCOUNTER (OUTPATIENT)
Dept: PSYCHIATRY | Age: 39
Setting detail: THERAPIES SERIES
Discharge: HOME | End: 2019-05-01
Payer: MEDICAID

## 2019-05-01 PROCEDURE — 90853 GROUP PSYCHOTHERAPY: CPT

## 2019-05-03 ENCOUNTER — HOSPITAL ENCOUNTER (OUTPATIENT)
Dept: PSYCHIATRY | Age: 39
Setting detail: THERAPIES SERIES
Discharge: HOME | End: 2019-05-03
Payer: MEDICAID

## 2019-05-03 PROCEDURE — 90834 PSYTX W PT 45 MINUTES: CPT

## 2019-05-08 ENCOUNTER — APPOINTMENT (OUTPATIENT)
Dept: PSYCHIATRY | Age: 39
End: 2019-05-08
Payer: MEDICAID

## 2019-05-10 ENCOUNTER — HOSPITAL ENCOUNTER (OUTPATIENT)
Dept: PSYCHIATRY | Age: 39
Setting detail: THERAPIES SERIES
Discharge: HOME | End: 2019-05-10
Payer: MEDICAID

## 2019-05-10 PROCEDURE — 90834 PSYTX W PT 45 MINUTES: CPT

## 2019-05-10 PROCEDURE — 80305 DRUG TEST PRSMV DIR OPT OBS: CPT

## 2019-05-15 ENCOUNTER — HOSPITAL ENCOUNTER (OUTPATIENT)
Dept: PSYCHIATRY | Age: 39
Setting detail: THERAPIES SERIES
Discharge: HOME | End: 2019-05-15
Payer: MEDICAID

## 2019-05-15 PROCEDURE — 90853 GROUP PSYCHOTHERAPY: CPT

## 2019-05-17 ENCOUNTER — APPOINTMENT (OUTPATIENT)
Dept: PSYCHIATRY | Age: 39
End: 2019-05-17
Payer: MEDICAID

## 2019-05-24 ENCOUNTER — APPOINTMENT (OUTPATIENT)
Dept: PSYCHIATRY | Age: 39
End: 2019-05-24
Payer: MEDICAID

## 2019-05-29 ENCOUNTER — APPOINTMENT (OUTPATIENT)
Dept: PSYCHIATRY | Age: 39
End: 2019-05-29
Payer: MEDICAID

## 2019-05-31 ENCOUNTER — HOSPITAL ENCOUNTER (OUTPATIENT)
Dept: PSYCHIATRY | Age: 39
Setting detail: THERAPIES SERIES
Discharge: HOME | End: 2019-05-31
Payer: MEDICAID

## 2019-05-31 PROCEDURE — 90834 PSYTX W PT 45 MINUTES: CPT

## 2019-06-07 ENCOUNTER — APPOINTMENT (OUTPATIENT)
Dept: PSYCHIATRY | Age: 39
End: 2019-06-07
Payer: MEDICAID

## 2019-06-14 ENCOUNTER — APPOINTMENT (OUTPATIENT)
Dept: PSYCHIATRY | Age: 39
End: 2019-06-14
Payer: MEDICAID

## 2019-06-20 ENCOUNTER — HOSPITAL ENCOUNTER (OUTPATIENT)
Dept: PSYCHIATRY | Age: 39
Setting detail: THERAPIES SERIES
Discharge: HOME | End: 2019-06-20
Payer: MEDICAID

## 2019-06-20 ENCOUNTER — HOSPITAL ENCOUNTER (EMERGENCY)
Age: 39
Discharge: HOME | End: 2019-06-20
Payer: MEDICAID

## 2019-06-20 VITALS
HEART RATE: 70 BPM | HEIGHT: 61 IN | WEIGHT: 250 LBS | TEMPERATURE: 97.6 F | RESPIRATION RATE: 15 BRPM | OXYGEN SATURATION: 97 % | SYSTOLIC BLOOD PRESSURE: 110 MMHG | DIASTOLIC BLOOD PRESSURE: 66 MMHG | BODY MASS INDEX: 47.2 KG/M2

## 2019-06-20 DIAGNOSIS — R51.9 ACUTE NONINTRACTABLE HEADACHE, UNSPECIFIED HEADACHE TYPE: Primary | ICD-10-CM

## 2019-06-20 PROCEDURE — 90834 PSYTX W PT 45 MINUTES: CPT

## 2019-06-20 PROCEDURE — 96374 THER/PROPH/DIAG INJ IV PUSH: CPT

## 2019-06-20 PROCEDURE — 96375 TX/PRO/DX INJ NEW DRUG ADDON: CPT

## 2019-06-20 PROCEDURE — 6360000002 HC RX W HCPCS: Performed by: PHYSICIAN ASSISTANT

## 2019-06-20 PROCEDURE — 6370000000 HC RX 637 (ALT 250 FOR IP): Performed by: PHYSICIAN ASSISTANT

## 2019-06-20 PROCEDURE — 99283 EMERGENCY DEPT VISIT LOW MDM: CPT

## 2019-06-20 RX ORDER — BUTALBITAL, ACETAMINOPHEN AND CAFFEINE 50; 325; 40 MG/1; MG/1; MG/1
1 TABLET ORAL ONCE
Status: COMPLETED | OUTPATIENT
Start: 2019-06-20 | End: 2019-06-20

## 2019-06-20 RX ORDER — ONDANSETRON 4 MG/1
4 TABLET, ORALLY DISINTEGRATING ORAL EVERY 8 HOURS PRN
Qty: 15 TABLET | Refills: 0 | Status: SHIPPED | OUTPATIENT
Start: 2019-06-20 | End: 2020-01-09

## 2019-06-20 RX ORDER — BUTALBITAL, ACETAMINOPHEN AND CAFFEINE 50; 325; 40 MG/1; MG/1; MG/1
1 TABLET ORAL EVERY 4 HOURS PRN
Qty: 20 TABLET | Refills: 0 | Status: SHIPPED | OUTPATIENT
Start: 2019-06-20 | End: 2020-01-09

## 2019-06-20 RX ORDER — 0.9 % SODIUM CHLORIDE 0.9 %
1000 INTRAVENOUS SOLUTION INTRAVENOUS ONCE
Status: DISCONTINUED | OUTPATIENT
Start: 2019-06-20 | End: 2019-06-20

## 2019-06-20 RX ORDER — DIPHENHYDRAMINE HYDROCHLORIDE 50 MG/ML
50 INJECTION INTRAMUSCULAR; INTRAVENOUS ONCE
Status: COMPLETED | OUTPATIENT
Start: 2019-06-20 | End: 2019-06-20

## 2019-06-20 RX ORDER — METOCLOPRAMIDE HYDROCHLORIDE 5 MG/ML
10 INJECTION INTRAMUSCULAR; INTRAVENOUS ONCE
Status: COMPLETED | OUTPATIENT
Start: 2019-06-20 | End: 2019-06-20

## 2019-06-20 RX ORDER — DEXAMETHASONE SODIUM PHOSPHATE 10 MG/ML
10 INJECTION, SOLUTION INTRAMUSCULAR; INTRAVENOUS ONCE
Status: COMPLETED | OUTPATIENT
Start: 2019-06-20 | End: 2019-06-20

## 2019-06-20 RX ADMIN — DEXAMETHASONE SODIUM PHOSPHATE 10 MG: 10 INJECTION, SOLUTION INTRAMUSCULAR; INTRAVENOUS at 11:22

## 2019-06-20 RX ADMIN — METOCLOPRAMIDE 10 MG: 5 INJECTION, SOLUTION INTRAMUSCULAR; INTRAVENOUS at 11:22

## 2019-06-20 RX ADMIN — BUTALBITAL, ACETAMINOPHEN, AND CAFFEINE 1 TABLET: 50; 325; 40 TABLET ORAL at 12:47

## 2019-06-20 RX ADMIN — DIPHENHYDRAMINE HYDROCHLORIDE 50 MG: 50 INJECTION, SOLUTION INTRAMUSCULAR; INTRAVENOUS at 11:22

## 2019-06-20 ASSESSMENT — PAIN SCALES - GENERAL
PAINLEVEL_OUTOF10: 5
PAINLEVEL_OUTOF10: 10
PAINLEVEL_OUTOF10: 7

## 2019-06-20 NOTE — ED NOTES
Aditi patient x3 for labs no success by Phlebot. Homer PALUMBO stated no labs needed at this time     [facility] SIMRAN Trujillo  06/20/19 0946       Cherelle Trujillo  06/20/19 [id number]

## 2019-06-20 NOTE — ED PROVIDER NOTES
eMERGENCY dEPARTMENT eNCOUnter         [address]    PCP: Christiano Mcgee MD    [address] [facility]    Chief Complaint   Patient presents with    Headache     x2 days    Emesis       HPI    Kenya Bee is a 46 y.o. female who presents with gradual onset of a headache since onset 2 days ago. Context is patient reports a long history of migraines. Is currently awaiting to establish care with local neurology. Typically on gabapentin as well as Aleve for headaches. She reports gradual onset of a headache 2 days ago without preceding trauma or injury. Pain is constant throbbing pain, 10/10 which radiates to the top of the head. She has associated nausea with 2 episodes of nonbilious/nonbloody vomiting yesterday, none today. Also reporting photophobia and phonophobia. No fever or chills, changes in vision, neck pain or stiffness. Was seen for similar headache in March 2019. This headache is not the worst headache of the patient's life. She denies thunderclap onset. Denying any numbness tingling or weakness radiating down the upper or lower extremities. No associated chest pain, cough or abdominal/urinary complaints. No concern for pregnancy. Patient is a previous IV heroin drug user, states that she has been clean for the last year. Denying any back pain, saddle paresthesia, bowel incontinence or bladder retention. Patient has a history of headaches and denies any new symptoms different from previous headaches. States that headaches are typically brought on by stress and she has had increased stress in the last week    REVIEW OF SYSTEMS    Constitutional:  Denies fever, chills, weight loss or weakness   Neurologic:   Denies confusion or memory loss. Denies light-headedness, dizziness, or LOC. Denies stiff neck. Denies weakness or sensory changes   Eyes:   Denies discharge .    Has photophobia,  Denies dipplopia, blurred vision, or loss visual field  HENT:  Denies sore throat or ear pain   Cardiovascular:  Denies chest pain, palpitations. Respiratory:  Denies cough, shortness of breath, respiratory discomfort   G/:  Denies abdominal pain, diarrhea. + nausea. + vomiting. Denies Dysuria or Hematuria.   Adamantly denies pregnancy  Musculoskeletal:  Denies back pain   Skin:  Denies rash   Endocrine:  Denies polyuria or polydypsia   Lymphatic:  Denies swollen glands   Psychiatric:  Denies depression, suicidal ideation or homicidal ideation     All other review of systems are negative  See HPI and nursing notes for additional information       PAST MEDICAL & SURGICAL HISTORY    Past Medical History:   Diagnosis Date    [facility]/o [facility] [address]     Heroin dependence [address] [address]     MRSA infection      Past Surgical History:   Procedure Laterality Date    CHOLECYSTECTOMY      CYST INCISION AND DRAINAGE      numerous places 6 different areas    OTHER SURGICAL HISTORY  02/27/15    I&D of R shoulder AC joint    SHOULDER SURGERY Right     TOE SURGERY      were amputated in accident and sewn back on    TONSILLECTOMY         CURRENT MEDICATIONS    Current Outpatient Rx   Medication Sig Dispense Refill    butalbital-acetaminophen-caffeine (FIORICET, ESGIC) -40 MG per tablet Take 1 tablet by mouth every 4 hours as needed for Headaches 20 tablet 0    ondansetron (ZOFRAN ODT) 4 MG disintegrating tablet Take 1 tablet by mouth every 8 hours as needed for Nausea 15 tablet 0    cloNIDine (CATAPRES) 0.1 MG tablet Take 0.1 mg by mouth 2 times daily      mirtazapine (REMERON) 15 MG tablet Take 15 mg by mouth nightly      naltrexone (DEPADE) 50 MG tablet Take 50 mg by mouth daily      Naltrexone (VIVITROL IM) Inject into the muscle every 30 days      naproxen (NAPROSYN) 500 MG tablet Take 1 tablet by mouth 2 times daily 15 tablet 0    acetaminophen (APAP EXTRA STRENGTH) 500 MG tablet Take 1 tablet by mouth every 6 hours as needed for Pain 40 tablet 0       ALLERGIES    Allergies   Allergen Reactions    Latex Hives    Darvocet [Propoxyphene N-Acetaminophen] Hives    Asa [Aspirin] Hives       SOCIAL & FAMILY HISTORY    Social History     Socioeconomic History    Marital status: Single     Spouse name: None    Number of children: None    Years of education: None    Highest education level: None   Occupational History    None   Social Needs    Financial resource strain: None    Food insecurity:     Worry: None     Inability: None    Transportation needs:     Medical: None     Non-medical: None   Tobacco Use    Smoking status: Current Every Day Smoker     Packs/day: 1.00     Types: Cigarettes    Smokeless tobacco: Never Used   Substance and Sexual Activity    Alcohol use: No    Drug use: No     Types: IV     Comment: stopped approx 2015    Sexual activity: Yes     Partners: Male   Lifestyle    Physical activity:     Days per week: None     Minutes per session: None    Stress: None   Relationships    Social connections:     Talks on phone: None     Gets together: None     Attends Religion service: None     Active member of club or organization: None     Attends meetings of clubs or organizations: None     Relationship status: None    Intimate partner violence:     Fear of current or ex partner: None     Emotionally abused: None     Physically abused: None     Forced sexual activity: None   Other Topics Concern    None   Social History Narrative    None     Family History   Problem Relation Age of Onset    Heart Disease Mother     Asthma Mother     Cancer Maternal Grandmother     Heart Disease Maternal Grandmother     Asthma Maternal Grandmother        PHYSICAL EXAM    VITAL SIGNS: /66   Pulse 70   Temp 97.6 °F (36.4 °C)   Resp 15   Ht 5' 1\" (1.549 m)   Wt 250 lb (113.4 kg)   SpO2 97%   BMI 47.24 kg/m²    Constitutional:  Well developed, well nourished, no acute distress, sitting in darkened room  Neurologic:    - Alert & oriented x3, no slurred speech  - No obvious gross motor deficits  - Cranial nerves 2-12 grossly intact  - Normal finger to nose test bilaterally  - Rapid alternating movements intact  - Upper and lower extremity DTRs intact. - 5/5 strength of upper and lower extremities with  and dorsi/plantar flexion on resistance  - Sensation equal and intact to light/sharp touch  - Brudzinski and Kernig's signs negative  - Patient ambulates in the ED with a steady gait, No pronator drift. HENT:  Atraumatic, Normocephalic. No temporal artery tenderness. Moist mucus membranes, airway patent  Eyes: Conjunctiva clear. No tearing . Pupils equally round and react to light, extraocular movement are intact. Funduscopic exam reveals red reflex intact without obvious retinal hemorrhages or defects, no obvious papilledema - fundoscopic exam is limited as not a dilated exam  Neck: supple, no JVD. Cardiovascular:  Reg rate & rhythm, no murmurs/rubs/gallops. Respiratory:  Lungs Clear, no retractions   GI:  Soft, nontender, normal bowel sounds  Musculoskeletal:  No edema, no deformities  Integument:  Well hydrated, no petechiae   Psych: Pleasant affect, no hallucinations    I have reviewed and interpreted all of the currently available lab results from this visit (if applicable):  No results found for this visit on 06/20/19. IMAGING:   Radiographs (if obtained):  [] Radiologist's Report Reviewed:  NONE      Differential Diagnosis: Subarachnoid hemorrhage, Meningitis, Temporal arteritis, Pseudotumor Cerebri, Acute renal failure, Migraine,      ED COURSE & MEDICAL DECISION MAKING      Vital signs and nursing notes reviewed during ED course. I have independently evaluated this patient. Supervising physician, Dr. Richy Vee - was present in ED and available for consultation throughout entirety of patient's care. All pertinent Lab data and radiographic results reviewed with patient at bedside.      The patient and / or the family were informed of the results of any tests, a time was given to answer questions, a plan was proposed and they agreed with plan. Clinical  IMPRESSION    1. Acute nonintractable headache, unspecified headache type          Patient presents with gradual onset of frontal headache with history of similar migraines. On exam, well-appearing nontoxic 30-year-old female, afebrile and in no acute distress. Ambulatory in the ED with a steady gait. No nuchal rigidity or signs of meningeal metastases. Equal strength DTRs range of motion and sensation in the bilateral upper and lower extremity. Remaining exam is nonfocal.  Patient is ordered IV medications however after multiple attempts by nursing staff, unable to establish an IV line. Medications were instead given IM - Benadryl, Reglan and Decadron. On reassessment, patient states that she is feeling significantly improved. Headache today appears similar to patient's chronic migraine history without acute new red flag symptoms/exam findingds that would indicate emergent need for additional labs, CT head imaging or LP. Patient is comfortable with being discharged home and is planning to establish care with local neurology to discuss Botox injections for history of migraine management. I estimate there is low risk for bacterial meningitis, subarachnoid hemorrhage, intracranial hemorrhage, ischemic or hemorrhage CVA or acute coronary syndrome thus I consider the discharge disposition reasonable. Patient is discharged at this time stable condition with prescription for Fioricet and Zofran. She continual gabapentin and additional leave as needed. Encourage rest, push clear fluids. Discussed follow-up with PCP in the next 1-2 days. Patient (or their surrogate) and I have discussed the diagnosis and risks, and we agree with discharging home with close follow-up.  We have discussed the symptoms which are most concerning that necessitate immediate return,

## 2019-06-20 NOTE — ED NOTES
Discharge instructions reviewed with patient. PT verbalizes understanding. All questions answered. Follow up instructions given. PT denies any further needs at this time.       Bard Card, LPN  16/10/18 [id number]

## 2019-06-20 NOTE — ED TRIAGE NOTES
Pt reports migraine for 2 days. Pt complains of sensitivity to light and sound. Pt also reports emesis during migraine episode. Pt reports having a history of migraines.

## 2019-06-20 NOTE — ED NOTES
Discharge instructions reviewed with patient. PT verbalizes understanding. All questions answered. Follow up instructions given. PT denies any further needs at this time.       [place], [place]  23/54/18 1173

## 2019-06-28 ENCOUNTER — APPOINTMENT (OUTPATIENT)
Dept: PSYCHIATRY | Age: 39
End: 2019-06-28
Payer: MEDICAID

## 2019-07-03 ENCOUNTER — APPOINTMENT (OUTPATIENT)
Dept: PSYCHIATRY | Age: 39
End: 2019-07-03
Payer: MEDICAID

## 2019-07-05 ENCOUNTER — APPOINTMENT (OUTPATIENT)
Dept: PSYCHIATRY | Age: 39
End: 2019-07-05
Payer: MEDICAID

## 2019-07-10 ENCOUNTER — APPOINTMENT (OUTPATIENT)
Dept: PSYCHIATRY | Age: 39
End: 2019-07-10
Payer: MEDICAID

## 2019-07-12 ENCOUNTER — APPOINTMENT (OUTPATIENT)
Dept: PSYCHIATRY | Age: 39
End: 2019-07-12
Payer: MEDICAID

## 2019-07-17 ENCOUNTER — APPOINTMENT (OUTPATIENT)
Dept: PSYCHIATRY | Age: 39
End: 2019-07-17
Payer: MEDICAID

## 2019-07-19 ENCOUNTER — APPOINTMENT (OUTPATIENT)
Dept: PSYCHIATRY | Age: 39
End: 2019-07-19
Payer: MEDICAID

## 2019-07-24 ENCOUNTER — APPOINTMENT (OUTPATIENT)
Dept: PSYCHIATRY | Age: 39
End: 2019-07-24
Payer: MEDICAID

## 2019-07-26 ENCOUNTER — APPOINTMENT (OUTPATIENT)
Dept: PSYCHIATRY | Age: 39
End: 2019-07-26
Payer: MEDICAID

## 2019-07-31 ENCOUNTER — APPOINTMENT (OUTPATIENT)
Dept: PSYCHIATRY | Age: 39
End: 2019-07-31
Payer: MEDICAID

## 2019-08-02 ENCOUNTER — APPOINTMENT (OUTPATIENT)
Dept: PSYCHIATRY | Age: 39
End: 2019-08-02
Payer: MEDICAID

## 2019-08-06 ENCOUNTER — HOSPITAL ENCOUNTER (OUTPATIENT)
Dept: PSYCHIATRY | Age: 39
Setting detail: THERAPIES SERIES
Discharge: HOME | End: 2019-08-06
Payer: MEDICAID

## 2019-08-06 PROCEDURE — 90834 PSYTX W PT 45 MINUTES: CPT

## 2019-08-06 PROCEDURE — 80305 DRUG TEST PRSMV DIR OPT OBS: CPT

## 2019-08-06 ASSESSMENT — LIFESTYLE VARIABLES: HISTORY_ALCOHOL_USE: NO

## 2019-08-06 NOTE — PROGRESS NOTES
S-Client is a 46 yr old  female with a CPS case. O-Client was cooperative but very emotional AEB crying on and off during this assessment. Thought process logical, mood depressed, affect full and speech normal. Client denies any hallucinations or delusions. No SI/HI. A-Completed intake paperwork, administered initial UDS and began assessment. P-Client will return 8/13/19 to complete assessment and sign a participation agreement due to non compliance last time she was an active client.      Makenna LAU [facility]

## 2019-08-07 ENCOUNTER — APPOINTMENT (OUTPATIENT)
Dept: PSYCHIATRY | Age: 39
End: 2019-08-07
Payer: MEDICAID

## 2019-08-13 ENCOUNTER — APPOINTMENT (OUTPATIENT)
Dept: PSYCHIATRY | Age: 39
End: 2019-08-13
Payer: MEDICAID

## 2019-08-20 ENCOUNTER — APPOINTMENT (OUTPATIENT)
Dept: PSYCHIATRY | Age: 39
End: 2019-08-20
Payer: MEDICAID

## 2019-08-27 ENCOUNTER — APPOINTMENT (OUTPATIENT)
Dept: PSYCHIATRY | Age: 39
End: 2019-08-27
Payer: MEDICAID

## 2020-01-05 ENCOUNTER — APPOINTMENT (OUTPATIENT)
Dept: GENERAL RADIOLOGY | Age: 40
End: 2020-01-05
Payer: MEDICAID

## 2020-01-05 ENCOUNTER — HOSPITAL ENCOUNTER (EMERGENCY)
Age: 40
Discharge: ANOTHER HOSPITAL | End: 2020-01-06
Attending: EMERGENCY MEDICINE
Payer: MEDICAID

## 2020-01-05 VITALS
BODY MASS INDEX: 33.99 KG/M2 | DIASTOLIC BLOOD PRESSURE: 86 MMHG | TEMPERATURE: 98.2 F | RESPIRATION RATE: 16 BRPM | OXYGEN SATURATION: 92 % | HEIGHT: 61 IN | WEIGHT: 180 LBS | SYSTOLIC BLOOD PRESSURE: 125 MMHG | HEART RATE: 93 BPM

## 2020-01-05 LAB
ALBUMIN SERPL-MCNC: 3.7 GM/DL (ref 3.4–5)
ALP BLD-CCNC: 70 IU/L (ref 40–129)
ALT SERPL-CCNC: 12 U/L (ref 10–40)
ANION GAP SERPL CALCULATED.3IONS-SCNC: 10 MMOL/L (ref 4–16)
AST SERPL-CCNC: 20 IU/L (ref 15–37)
BASOPHILS ABSOLUTE: 0 K/CU MM
BASOPHILS RELATIVE PERCENT: 0.3 % (ref 0–1)
BILIRUB SERPL-MCNC: 0.3 MG/DL (ref 0–1)
BUN BLDV-MCNC: 11 MG/DL (ref 6–23)
CALCIUM SERPL-MCNC: 8.6 MG/DL (ref 8.3–10.6)
CHLORIDE BLD-SCNC: 94 MMOL/L (ref 99–110)
CO2: 26 MMOL/L (ref 21–32)
CREAT SERPL-MCNC: 0.8 MG/DL (ref 0.6–1.1)
DIFFERENTIAL TYPE: ABNORMAL
EOSINOPHILS ABSOLUTE: 0.1 K/CU MM
EOSINOPHILS RELATIVE PERCENT: 1.1 % (ref 0–3)
GFR AFRICAN AMERICAN: >60 ML/MIN/1.73M2
GFR NON-AFRICAN AMERICAN: >60 ML/MIN/1.73M2
GLUCOSE BLD-MCNC: 90 MG/DL (ref 70–99)
HCT VFR BLD CALC: 43.6 % (ref 37–47)
HEMOGLOBIN: 13.6 GM/DL (ref 12.5–16)
IMMATURE NEUTROPHIL %: 0.3 % (ref 0–0.43)
LYMPHOCYTES ABSOLUTE: 2.4 K/CU MM
LYMPHOCYTES RELATIVE PERCENT: 17.9 % (ref 24–44)
MCH RBC QN AUTO: 29.9 PG (ref 27–31)
MCHC RBC AUTO-ENTMCNC: 31.2 % (ref 32–36)
MCV RBC AUTO: 95.8 FL (ref 78–100)
MONOCYTES ABSOLUTE: 1 K/CU MM
MONOCYTES RELATIVE PERCENT: 7.5 % (ref 0–4)
NUCLEATED RBC %: 0 %
PDW BLD-RTO: 13.2 % (ref 11.7–14.9)
PLATELET # BLD: 205 K/CU MM (ref 140–440)
PMV BLD AUTO: 9.2 FL (ref 7.5–11.1)
POTASSIUM SERPL-SCNC: 3.3 MMOL/L (ref 3.5–5.1)
RBC # BLD: 4.55 M/CU MM (ref 4.2–5.4)
SEGMENTED NEUTROPHILS ABSOLUTE COUNT: 9.6 K/CU MM
SEGMENTED NEUTROPHILS RELATIVE PERCENT: 72.9 % (ref 36–66)
SODIUM BLD-SCNC: 130 MMOL/L (ref 135–145)
TOTAL IMMATURE NEUTOROPHIL: 0.04 K/CU MM
TOTAL NUCLEATED RBC: 0 K/CU MM
TOTAL PROTEIN: 7.4 GM/DL (ref 6.4–8.2)
WBC # BLD: 13.2 K/CU MM (ref 4–10.5)

## 2020-01-05 PROCEDURE — 99284 EMERGENCY DEPT VISIT MOD MDM: CPT

## 2020-01-05 PROCEDURE — 73130 X-RAY EXAM OF HAND: CPT

## 2020-01-05 PROCEDURE — 2580000003 HC RX 258: Performed by: EMERGENCY MEDICINE

## 2020-01-05 PROCEDURE — 85025 COMPLETE CBC W/AUTO DIFF WBC: CPT

## 2020-01-05 PROCEDURE — 96365 THER/PROPH/DIAG IV INF INIT: CPT

## 2020-01-05 PROCEDURE — 80053 COMPREHEN METABOLIC PANEL: CPT

## 2020-01-05 PROCEDURE — 96375 TX/PRO/DX INJ NEW DRUG ADDON: CPT

## 2020-01-05 PROCEDURE — 6360000002 HC RX W HCPCS: Performed by: EMERGENCY MEDICINE

## 2020-01-05 RX ORDER — MORPHINE SULFATE 4 MG/ML
4 INJECTION, SOLUTION INTRAMUSCULAR; INTRAVENOUS ONCE
Status: COMPLETED | OUTPATIENT
Start: 2020-01-05 | End: 2020-01-05

## 2020-01-05 RX ADMIN — CEFEPIME HYDROCHLORIDE 2 G: 2 INJECTION, POWDER, FOR SOLUTION INTRAVENOUS at 23:16

## 2020-01-05 RX ADMIN — MORPHINE SULFATE 4 MG: 4 INJECTION, SOLUTION INTRAMUSCULAR; INTRAVENOUS at 23:45

## 2020-01-05 RX ADMIN — VANCOMYCIN HYDROCHLORIDE 1750 MG: 5 INJECTION, POWDER, LYOPHILIZED, FOR SOLUTION INTRAVENOUS at 23:46

## 2020-01-05 ASSESSMENT — PAIN DESCRIPTION - PAIN TYPE: TYPE: ACUTE PAIN

## 2020-01-05 ASSESSMENT — PAIN DESCRIPTION - PROGRESSION: CLINICAL_PROGRESSION: GRADUALLY WORSENING

## 2020-01-05 ASSESSMENT — PAIN DESCRIPTION - FREQUENCY: FREQUENCY: CONTINUOUS

## 2020-01-05 ASSESSMENT — PAIN DESCRIPTION - ORIENTATION: ORIENTATION: RIGHT

## 2020-01-05 ASSESSMENT — PAIN DESCRIPTION - ONSET: ONSET: ON-GOING

## 2020-01-05 ASSESSMENT — PAIN DESCRIPTION - LOCATION: LOCATION: FINGER (COMMENT WHICH ONE)

## 2020-01-05 ASSESSMENT — PAIN DESCRIPTION - DESCRIPTORS: DESCRIPTORS: STABBING;BURNING

## 2020-01-05 ASSESSMENT — PAIN SCALES - GENERAL
PAINLEVEL_OUTOF10: 10
PAINLEVEL_OUTOF10: 10

## 2020-01-06 LAB
REASON FOR REJECTION: NORMAL
REASON FOR REJECTION: NORMAL
REJECTED TEST: NORMAL
REJECTED TEST: NORMAL

## 2020-01-06 NOTE — ED PROVIDER NOTES
Triage Chief Complaint:   No chief complaint on file. [place]:  Chelsy Cota is a 46 y.o. female that presents with right index finger infection. She states that 2 days ago she noticed a pimple on the knuckle of her right index finger which she popped and had some yellow pus come out of. She states that since then she has had worsening swelling, redness that is covered her index finger and started to go up her hand and wrist.  Denies any fevers or IV drug use. Has not had any nausea, vomiting. Denies any motor or sensory deficits but has increased pain with range of motion of her right index finger. She is right-hand dominant. ROS:  At least 10 systems reviewed and otherwise acutely negative except as in the [address]     Past Medical History:   Diagnosis Date    Hep C w/o coma, chronic (HCC)     [facility]in dependence ([facility] [address]     MRSA infection      Past Surgical History:   Procedure Laterality Date    CHOLECYSTECTOMY      CYST INCISION AND DRAINAGE      numerous places 6 different areas    OTHER SURGICAL HISTORY  02/27/15    I&D of R shoulder AC joint    SHOULDER SURGERY Right     TOE SURGERY      were amputated in accident and sewn back on    TONSILLECTOMY       Family History   Problem Relation Age of Onset    Heart Disease Mother     Asthma Mother     Cancer Maternal Grandmother     Heart Disease Maternal Grandmother     Asthma Maternal Grandmother      Social History     Socioeconomic History    Marital status: Single     Spouse name: Not on file    Number of children: Not on file    Years of education: Not on file    Highest education level: Not on file   Occupational History    Not on file   Social Needs    Financial resource strain: Not on file    Food insecurity:     Worry: Not on file     Inability: Not on file    Transportation needs:     Medical: Not on file     Non-medical: Not on file   Tobacco Use    Smoking status: Current Every Day Smoker     Packs/day: 1.00     Types: Cigarettes    Smokeless tobacco: Never Used   Substance and Sexual Activity    Alcohol use: No    Drug use: No     Types: IV     Comment: stopped approx 2015    Sexual activity: Yes     Partners: Male   Lifestyle    Physical activity:     Days per week: Not on file     Minutes per session: Not on file    Stress: Not on file   Relationships    Social connections:     Talks on phone: Not on file     Gets together: Not on file     Attends Anabaptism service: Not on file     Active member of club or organization: Not on file     Attends meetings of clubs or organizations: Not on file     Relationship status: Not on file    Intimate partner violence:     Fear of current or ex partner: Not on file     Emotionally abused: Not on file     Physically abused: Not on file     Forced sexual activity: Not on file   Other Topics Concern    Not on file   Social History Narrative    Not on file     Current Facility-Administered Medications   Medication Dose Route Frequency Provider Last Rate Last Dose    vancomycin (VANCOCIN) 1,750 mg in dextrose 5 % 500 mL IVPB  1,750 mg Intravenous Once Kati Esqueda MD        cefepime (MAXIPIME) 2 g IVPB minibag  2 g Intravenous Once Kati Esqueda MD         Current Outpatient Medications   Medication Sig Dispense Refill    butalbital-acetaminophen-caffeine (FIORICET, ESGIC) -40 MG per tablet Take 1 tablet by mouth every 4 hours as needed for Headaches 20 tablet 0    ondansetron (ZOFRAN ODT) 4 MG disintegrating tablet Take 1 tablet by mouth every 8 hours as needed for Nausea 15 tablet 0    cloNIDine (CATAPRES) 0.1 MG tablet Take 0.1 mg by mouth 2 times daily      mirtazapine (REMERON) 15 MG tablet Take 15 mg by mouth nightly      naltrexone (DEPADE) 50 MG tablet Take 50 mg by mouth daily      Naltrexone (VIVITROL IM) Inject into the muscle every 30 days      naproxen (NAPROSYN) 500 MG tablet Take 1 tablet by mouth 2 times daily 15 tablet 0    acetaminophen (APAP EXTRA STRENGTH) 500 MG tablet Take 1 tablet by mouth every 6 hours as needed for Pain 40 tablet 0     Allergies   Allergen Reactions    Latex Hives    Darvocet [Propoxyphene N-Acetaminophen] Hives    Asa [Aspirin] Hives       Nursing Notes Reviewed    Physical Exam:  ED Triage Vitals [01/05/20 2157]   Enc Vitals Group      /86      Pulse 93      Resp 16      Temp 98.2 °F (36.8 °C)      Temp Source Oral      SpO2 92 %      Weight 180 lb (81.6 kg)      Height 5' 1\" (1.549 m)      Head Circumference       Peak Flow       Pain Score       Pain Loc       Pain Edu? Excl. in [address]? GENERAL APPEARANCE: Awake and alert. Cooperative. No acute distress. HEAD: Normocephalic. Atraumatic. EYES: EOM's grossly intact. Sclera anicteric. ENT: Mucous membranes are moist. Tolerates saliva. No trismus. NECK: Supple. Trachea midline. HEART: RRR. Radial pulses 2+. LUNGS: Respirations unlabored. CTAB  ABDOMEN: Soft. Non-tender. No guarding or rebound. EXTREMITIES: RUE: Approximately 3 x 2 cm area of purulence overlying index finger PIP joint on the dorsal aspect wrapping around the radial side. Index finger held in slight flexion. Pain with passive extension of index finger. Tenderness to palpation over flexor surfaces of index finger. Slight fusiform swelling and erythema of index finger. Patient does have some erythema and swelling extending up dorsal aspect of hand to the level of the wrist.  Palpable radial pulse. Decreased range of motion of index finger. Sensory distribution of radial/median/ulnar nerves intact. SKIN: Warm and dry. NEUROLOGICAL: No gross facial drooping. Moves all 4 extremities spontaneously. PSYCHIATRIC: Normal mood.     I have reviewed and interpreted all of the currently available lab results from this visit (if applicable):  Results for orders placed or performed during the hospital encounter of 01/05/20   CBC Auto Differential   Result Value Ref Range    WBC 13.2 (H) 4.0 - 10.5 K/CU

## 2020-01-08 ENCOUNTER — HOSPITAL ENCOUNTER (EMERGENCY)
Age: 40
Discharge: PSYCHIATRIC OR SUBSTANCE USE FACILITY | End: 2020-01-10
Attending: EMERGENCY MEDICINE
Payer: MEDICAID

## 2020-01-08 LAB
ACETAMINOPHEN LEVEL: <5 UG/ML (ref 15–30)
ALBUMIN SERPL-MCNC: 3.4 GM/DL (ref 3.4–5)
ALCOHOL SCREEN SERUM: NORMAL %WT/VOL
ALP BLD-CCNC: 61 IU/L (ref 40–129)
ALT SERPL-CCNC: 11 U/L (ref 10–40)
AMPHETAMINES: NEGATIVE
ANION GAP SERPL CALCULATED.3IONS-SCNC: 12 MMOL/L (ref 4–16)
AST SERPL-CCNC: 15 IU/L (ref 15–37)
BARBITURATE SCREEN URINE: NEGATIVE
BASOPHILS ABSOLUTE: 0 K/CU MM
BASOPHILS RELATIVE PERCENT: 0.4 % (ref 0–1)
BENZODIAZEPINE SCREEN, URINE: NEGATIVE
BILIRUB SERPL-MCNC: 0.1 MG/DL (ref 0–1)
BUN BLDV-MCNC: 10 MG/DL (ref 6–23)
CALCIUM SERPL-MCNC: 8.3 MG/DL (ref 8.3–10.6)
CANNABINOID SCREEN URINE: NEGATIVE
CHLORIDE BLD-SCNC: 103 MMOL/L (ref 99–110)
CO2: 23 MMOL/L (ref 21–32)
COCAINE METABOLITE: ABNORMAL
CREAT SERPL-MCNC: 1.3 MG/DL (ref 0.6–1.1)
DIFFERENTIAL TYPE: ABNORMAL
DOSE AMOUNT: ABNORMAL
DOSE AMOUNT: ABNORMAL
DOSE TIME: ABNORMAL
DOSE TIME: ABNORMAL
EOSINOPHILS ABSOLUTE: 0.3 K/CU MM
EOSINOPHILS RELATIVE PERCENT: 4 % (ref 0–3)
GFR AFRICAN AMERICAN: 55 ML/MIN/1.73M2
GFR NON-AFRICAN AMERICAN: 46 ML/MIN/1.73M2
GLUCOSE BLD-MCNC: 72 MG/DL (ref 70–99)
GONADOTROPIN, CHORIONIC (HCG) QUANT: NORMAL UIU/ML
HCT VFR BLD CALC: 42.8 % (ref 37–47)
HEMOGLOBIN: 13.3 GM/DL (ref 12.5–16)
IMMATURE NEUTROPHIL %: 0.3 % (ref 0–0.43)
LYMPHOCYTES ABSOLUTE: 2.1 K/CU MM
LYMPHOCYTES RELATIVE PERCENT: 27.8 % (ref 24–44)
MCH RBC QN AUTO: 29 PG (ref 27–31)
MCHC RBC AUTO-ENTMCNC: 31.1 % (ref 32–36)
MCV RBC AUTO: 93.4 FL (ref 78–100)
MONOCYTES ABSOLUTE: 0.5 K/CU MM
MONOCYTES RELATIVE PERCENT: 6.7 % (ref 0–4)
NUCLEATED RBC %: 0 %
OPIATES, URINE: ABNORMAL
OXYCODONE: ABNORMAL
PDW BLD-RTO: 13.4 % (ref 11.7–14.9)
PHENCYCLIDINE, URINE: NEGATIVE
PLATELET # BLD: 249 K/CU MM (ref 140–440)
PMV BLD AUTO: 9.3 FL (ref 7.5–11.1)
POTASSIUM SERPL-SCNC: 3.6 MMOL/L (ref 3.5–5.1)
RBC # BLD: 4.58 M/CU MM (ref 4.2–5.4)
SALICYLATE LEVEL: <0.3 MG/DL (ref 15–30)
SEGMENTED NEUTROPHILS ABSOLUTE COUNT: 4.6 K/CU MM
SEGMENTED NEUTROPHILS RELATIVE PERCENT: 60.8 % (ref 36–66)
SODIUM BLD-SCNC: 138 MMOL/L (ref 135–145)
TOTAL IMMATURE NEUTOROPHIL: 0.02 K/CU MM
TOTAL NUCLEATED RBC: 0 K/CU MM
TOTAL PROTEIN: 6.7 GM/DL (ref 6.4–8.2)
WBC # BLD: 7.5 K/CU MM (ref 4–10.5)

## 2020-01-08 PROCEDURE — 36415 COLL VENOUS BLD VENIPUNCTURE: CPT

## 2020-01-08 PROCEDURE — 85025 COMPLETE CBC W/AUTO DIFF WBC: CPT

## 2020-01-08 PROCEDURE — 6370000000 HC RX 637 (ALT 250 FOR IP): Performed by: EMERGENCY MEDICINE

## 2020-01-08 PROCEDURE — G0480 DRUG TEST DEF 1-7 CLASSES: HCPCS

## 2020-01-08 PROCEDURE — 80307 DRUG TEST PRSMV CHEM ANLYZR: CPT

## 2020-01-08 PROCEDURE — 99285 EMERGENCY DEPT VISIT HI MDM: CPT

## 2020-01-08 PROCEDURE — 84702 CHORIONIC GONADOTROPIN TEST: CPT

## 2020-01-08 PROCEDURE — 80053 COMPREHEN METABOLIC PANEL: CPT

## 2020-01-08 RX ORDER — SULFAMETHOXAZOLE AND TRIMETHOPRIM 800; 160 MG/1; MG/1
1 TABLET ORAL ONCE
Status: COMPLETED | OUTPATIENT
Start: 2020-01-08 | End: 2020-01-08

## 2020-01-08 RX ADMIN — SULFAMETHOXAZOLE AND TRIMETHOPRIM 1 TABLET: 800; 160 TABLET ORAL at 21:20

## 2020-01-08 ASSESSMENT — PAIN SCALES - GENERAL: PAINLEVEL_OUTOF10: 10

## 2020-01-09 PROCEDURE — 6370000000 HC RX 637 (ALT 250 FOR IP): Performed by: EMERGENCY MEDICINE

## 2020-01-09 RX ORDER — CLONIDINE HYDROCHLORIDE 0.1 MG/1
0.1 TABLET ORAL 2 TIMES DAILY
Status: DISCONTINUED | OUTPATIENT
Start: 2020-01-09 | End: 2020-01-10 | Stop reason: HOSPADM

## 2020-01-09 RX ORDER — MIRTAZAPINE 15 MG/1
15 TABLET, ORALLY DISINTEGRATING ORAL NIGHTLY
Status: DISCONTINUED | OUTPATIENT
Start: 2020-01-09 | End: 2020-01-10 | Stop reason: HOSPADM

## 2020-01-09 RX ORDER — GABAPENTIN 300 MG/1
300 CAPSULE ORAL 3 TIMES DAILY
COMMUNITY
Start: 2019-12-19

## 2020-01-09 RX ORDER — GABAPENTIN 300 MG/1
300 CAPSULE ORAL 3 TIMES DAILY
Status: DISCONTINUED | OUTPATIENT
Start: 2020-01-09 | End: 2020-01-10 | Stop reason: HOSPADM

## 2020-01-09 RX ORDER — BUSPIRONE HYDROCHLORIDE 7.5 MG/1
7.5 TABLET ORAL 2 TIMES DAILY
Status: DISCONTINUED | OUTPATIENT
Start: 2020-01-09 | End: 2020-01-10 | Stop reason: HOSPADM

## 2020-01-09 RX ORDER — HYDROCODONE BITARTRATE AND ACETAMINOPHEN 5; 325 MG/1; MG/1
1 TABLET ORAL EVERY 4 HOURS PRN
Status: DISCONTINUED | OUTPATIENT
Start: 2020-01-09 | End: 2020-01-10 | Stop reason: HOSPADM

## 2020-01-09 RX ORDER — HYDROCODONE BITARTRATE AND ACETAMINOPHEN 5; 325 MG/1; MG/1
1 TABLET ORAL ONCE
Status: COMPLETED | OUTPATIENT
Start: 2020-01-09 | End: 2020-01-09

## 2020-01-09 RX ORDER — OXYCODONE HYDROCHLORIDE 5 MG/1
5 TABLET ORAL 3 TIMES DAILY PRN
COMMUNITY
Start: 2020-01-06

## 2020-01-09 RX ORDER — BUSPIRONE HYDROCHLORIDE 7.5 MG/1
7.5 TABLET ORAL 2 TIMES DAILY
COMMUNITY
Start: 2019-12-19

## 2020-01-09 RX ORDER — SULFAMETHOXAZOLE AND TRIMETHOPRIM 800; 160 MG/1; MG/1
1 TABLET ORAL 2 TIMES DAILY
Qty: 14 TABLET | Refills: 0 | Status: SHIPPED | OUTPATIENT
Start: 2020-01-09 | End: 2020-01-16

## 2020-01-09 RX ORDER — SULFAMETHOXAZOLE AND TRIMETHOPRIM 800; 160 MG/1; MG/1
1 TABLET ORAL EVERY 12 HOURS SCHEDULED
Status: DISCONTINUED | OUTPATIENT
Start: 2020-01-09 | End: 2020-01-10 | Stop reason: HOSPADM

## 2020-01-09 RX ADMIN — HYDROCODONE BITARTRATE AND ACETAMINOPHEN 1 TABLET: 5; 325 TABLET ORAL at 22:42

## 2020-01-09 RX ADMIN — GABAPENTIN 300 MG: 300 CAPSULE ORAL at 16:17

## 2020-01-09 RX ADMIN — GABAPENTIN 300 MG: 300 CAPSULE ORAL at 22:41

## 2020-01-09 RX ADMIN — HYDROCODONE BITARTRATE AND ACETAMINOPHEN 1 TABLET: 5; 325 TABLET ORAL at 10:15

## 2020-01-09 RX ADMIN — SULFAMETHOXAZOLE AND TRIMETHOPRIM 1 TABLET: 800; 160 TABLET ORAL at 22:40

## 2020-01-09 RX ADMIN — CLONIDINE HYDROCHLORIDE 0.1 MG: 0.1 TABLET ORAL at 22:42

## 2020-01-09 RX ADMIN — SULFAMETHOXAZOLE AND TRIMETHOPRIM 1 TABLET: 800; 160 TABLET ORAL at 08:46

## 2020-01-09 RX ADMIN — BUSPIRONE HYDROCHLORIDE 7.5 MG: 7.5 TABLET ORAL at 22:40

## 2020-01-09 RX ADMIN — MIRTAZAPINE 15 MG: 15 TABLET, ORALLY DISINTEGRATING ORAL at 22:41

## 2020-01-09 RX ADMIN — HYDROCODONE BITARTRATE AND ACETAMINOPHEN 1 TABLET: 5; 325 TABLET ORAL at 14:29

## 2020-01-09 RX ADMIN — HYDROCODONE BITARTRATE AND ACETAMINOPHEN 1 TABLET: 5; 325 TABLET ORAL at 05:17

## 2020-01-09 RX ADMIN — HYDROCODONE BITARTRATE AND ACETAMINOPHEN 1 TABLET: 5; 325 TABLET ORAL at 18:33

## 2020-01-09 ASSESSMENT — PAIN SCALES - GENERAL
PAINLEVEL_OUTOF10: 10

## 2020-01-09 ASSESSMENT — PATIENT HEALTH QUESTIONNAIRE - PHQ9: SUM OF ALL RESPONSES TO PHQ QUESTIONS 1-9: 5

## 2020-01-09 NOTE — ED NOTES
Patient requesting some of her home medications, Dr. Lynn  notified, [facility]) contacted for med review.      Dennie Cables, RN  01/09/20 6306
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
## 2020-01-09 NOTE — ED NOTES
Patient agitated and keeps saying \" I Jayme Ward go home, its ridiculous that I've been here since 7pm last night\".      Sebastien Canales  01/09/20 8549

## 2020-01-09 NOTE — ED NOTES
RN spoke with Tiffanie Gallardo from [place] [id number] regarding how many of her antibiotics pt was able to take prior to her medication going missing. Pt reports taking 4 prior to missing medication.       Cathryn Beckford RN  01/09/20 8731

## 2020-01-09 NOTE — ED PROVIDER NOTES
strain: Not on file    Food insecurity:     Worry: Not on file     Inability: Not on file    Transportation needs:     Medical: Not on file     Non-medical: Not on file   Tobacco Use    Smoking status: Current Every Day Smoker     Packs/day: 1.00     Types: Cigarettes    Smokeless tobacco: Never Used   Substance and Sexual Activity    Alcohol use: No    Drug use: No     Types: IV     Comment: stopped approx 2015    Sexual activity: Yes     Partners: Male   Lifestyle    Physical activity:     Days per week: Not on file     Minutes per session: Not on file    Stress: Not on file   Relationships    Social connections:     Talks on phone: Not on file     Gets together: Not on file     Attends Jehovah's witness service: Not on file     Active member of club or organization: Not on file     Attends meetings of clubs or organizations: Not on file     Relationship status: Not on file    Intimate partner violence:     Fear of current or ex partner: Not on file     Emotionally abused: Not on file     Physically abused: Not on file     Forced sexual activity: Not on file   Other Topics Concern    Not on file   Social History Narrative    Not on file     Current Facility-Administered Medications   Medication Dose Route Frequency Provider Last Rate Last Dose    sulfamethoxazole-trimethoprim (BACTRIM DS;SEPTRA DS) 800-160 MG per tablet 1 tablet  1 tablet Oral 2 times per day Kati Esqueda MD         Current Outpatient Medications   Medication Sig Dispense Refill    sulfamethoxazole-trimethoprim (BACTRIM DS) 800-160 MG per tablet Take 1 tablet by mouth 2 times daily for 7 days 14 tablet 0    butalbital-acetaminophen-caffeine (FIORICET, ESGIC) -40 MG per tablet Take 1 tablet by mouth every 4 hours as needed for Headaches 20 tablet 0    ondansetron (ZOFRAN ODT) 4 MG disintegrating tablet Take 1 tablet by mouth every 8 hours as needed for Nausea 15 tablet 0    cloNIDine (CATAPRES) 0.1 MG tablet Take 0.1 mg by mouth 2 GM/DL    Hematocrit 42.8 37 - 47 %    MCV 93.4 78 - 100 FL    MCH 29.0 27 - 31 PG    MCHC 31.1 (L) 32.0 - 36.0 %    RDW 13.4 11.7 - 14.9 %    Platelets 269 786 - 624 K/CU MM    MPV 9.3 7.5 - 11.1 FL    Differential Type AUTOMATED DIFFERENTIAL     Segs Relative 60.8 36 - 66 %    Lymphocytes % 27.8 24 - 44 %    Monocytes % 6.7 (H) 0 - 4 %    Eosinophils % 4.0 (H) 0 - 3 %    Basophils % 0.4 0 - 1 %    Segs Absolute 4.6 K/CU MM    Lymphocytes Absolute 2.1 K/CU MM    Monocytes Absolute 0.5 K/CU MM    Eosinophils Absolute 0.3 K/CU MM    Basophils Absolute 0.0 K/CU MM    Nucleated RBC % 0.0 %    Total Nucleated RBC 0.0 K/CU MM    Total Immature Neutrophil 0.02 K/CU MM    Immature Neutrophil % 0.3 0 - 0.43 %   Comprehensive Metabolic Panel   Result Value Ref Range    Sodium 138 135 - 145 MMOL/L    Potassium 3.6 3.5 - 5.1 MMOL/L    Chloride 103 99 - 110 mMol/L    CO2 23 21 - 32 MMOL/L    BUN 10 6 - 23 MG/DL    CREATININE 1.3 (H) 0.6 - 1.1 MG/DL    Glucose 72 70 - 99 MG/DL    Calcium 8.3 8.3 - 10.6 MG/DL    Alb 3.4 3.4 - 5.0 GM/DL    Total Protein 6.7 6.4 - 8.2 GM/DL    Total Bilirubin 0.1 0.0 - 1.0 MG/DL    ALT 11 10 - 40 U/L    AST 15 15 - 37 IU/L    Alkaline Phosphatase 61 40 - 129 IU/L    GFR Non- 46 (L) >60 mL/min/1.73m2    GFR  55 (L) >60 mL/min/1.73m2    Anion Gap 12 4 - 16   HCG, Quantitative, Pregnancy   Result Value Ref Range    hCG Quant <0.5                                          UIU/ML    hCG Quant EXPECTED VALUES IN PREGNANCY UIU/ML    hCG Quant    7-50               0.2-1 WEEK UIU/ML    hCG Quant                 1-2 WEEKS UIU/ML    hCG Quant    100-5000           2-3 WEEKS UIU/ML    hCG Quant    500-10,000         3-4 WEEKS UIU/ML    hCG Quant    1000-50,000        4-5 WEEKS UIU/ML    hCG Quant    10,000-100,000     5-6 WEEKS UIU/ML    hCG Quant    15,000-200,000     6-8 WEEKS UIU/ML    hCG Quant    10,000-100,000     2-3 MONTHS UIU/ML   Ethanol   Result Value Ref Range Alcohol Scrn <0.01  THE VALUE IS BELOW OUR DETECTION LIMIT. <2.31 %WT/VOL   Salicylate   Result Value Ref Range    Salicylate Lvl <2.6 (L) 15 - 30 MG/DL    DOSE AMOUNT DOSE AMT. GIVEN - UNKNOWN     DOSE TIME DOSE TIME GIVEN - UNKNOWN    Acetaminophen Level   Result Value Ref Range    Acetaminophen Level <5.0 (L) 15 - 30 ug/ml    DOSE AMOUNT DOSE AMT. GIVEN - UNKNOWN     DOSE TIME DOSE TIME GIVEN - UNKNOWN    Urine Drug Screen   Result Value Ref Range    Cannabinoid Scrn, Ur NEGATIVE NEGATIVE    Amphetamines NEGATIVE NEGATIVE    Cocaine Metabolite UNCONFIRMED POSITIVE (A) NEGATIVE    Benzodiazepine Screen, Urine NEGATIVE NEGATIVE    Barbiturate Screen, Ur NEGATIVE NEGATIVE    Opiates, Urine UNCONFIRMED POSITIVE (A) NEGATIVE    Phencyclidine, Urine NEGATIVE NEGATIVE    Oxycodone  NEGATIVE     NEGATIVE          THRESHOLD CONCENTRATIONS (mg/dL)  AMPHT               1000  MAMI,OPIA             300  BZO,BAR              200  PCP                   25  THC                   50  OXY                  100          IF POSITIVE, SPECIMEN WILL BE  DISCARDED AFTER 6 MONTHS. CALL LAB IF CONFIRMATION NEEDED. ALL NEGATIVE SPECIMENS WILL BE  DISCARDED AFTER ONE WEEK. * UNCONFIRMED POSITIVES MAY  NOT MEET FORENSIC REQUIREMENTS. Radiographs (if obtained):  [] The following radiograph was interpreted by myself in the absence of a radiologist:  [] Radiologist's Report Reviewed:    EKG (if obtained): (All EKG's are interpreted by myself in the absence of a cardiologist)    MDM:  Plan of care is discussed thoroughly with the patient and family if present. If performed, all imaging and lab work also discussed with patient. All relevant prior results and chart reviewed if available. Patient presents as above. Vital signs are normal.  She is in no acute distress. Patient given Bactrim for continued postoperative treatment of right upper extremity infection.      Patient has no active medical issues that require further evaluation or management here. She has been able evaluated by [facility] mental health and does need inpatient stabilization. Awaiting accepting facility at this time. 0600:a.m.  I have signed out [facility] Emergency Department care to Dr. Breann Messer. We discussed the pertinent history, physical exam, completed/pending test results (if applicable) and current treatment plan. Please refer to his/her chart for the patients remaining Emergency Department course and final disposition. Clinical Impression:  1.  Suicidal ideation      (Please note that portions of this note may have been completed with a voice recognition program. Efforts were made to edit the dictations but occasionally words are mis-transcribed.)    MD Jose Gillespie MD  01/09/20 3187

## 2020-01-09 NOTE — ED NOTES
Pt sitting in hallbed with sitter at bedside.  Pt calm and cooperative     Deepika Monterroso, [facility]  01/08/20 7454

## 2020-01-09 NOTE — ED PROVIDER NOTES
Patient signed out to me by Dr. Teagan Rose,    39yof with SI, was evaluated at [facility] and sent here for medical clearance, no bed available. Had recent surgery on finger, is on bactrim, getting norco Q4hr for pain control. Awaiting MH placement. Nayely Perrin MD  01/09/20 1408        Patient accepted to [facility].       Nayely Perrin MD  01/09/20 4184

## 2020-01-10 VITALS
DIASTOLIC BLOOD PRESSURE: 55 MMHG | HEART RATE: 64 BPM | HEIGHT: 61 IN | OXYGEN SATURATION: 98 % | TEMPERATURE: 98.1 F | BODY MASS INDEX: 34.55 KG/M2 | RESPIRATION RATE: 16 BRPM | WEIGHT: 183 LBS | SYSTOLIC BLOOD PRESSURE: 111 MMHG

## 2020-01-10 NOTE — ED NOTES
Patient sitting up in bed watching TV at this time, remains in green gown. Respirations easy and regular. No s/sx of distress noted, sitter remains in room to continue suicide precautions.       Fletcher Ruby RN  01/09/20 4303

## 2020-01-10 NOTE — ED NOTES
Patient continues to rest in bed. Suicide precautions remain in place with patient in green gown, sitter remains in room. Awaiting scheduled meds from  Pharmacy.       Danni Reynoso RN  01/09/20 6447

## 2020-02-29 ENCOUNTER — APPOINTMENT (OUTPATIENT)
Dept: GENERAL RADIOLOGY | Age: 40
End: 2020-02-29
Payer: MEDICAID

## 2020-02-29 ENCOUNTER — HOSPITAL ENCOUNTER (EMERGENCY)
Age: 40
Discharge: LEFT BEFORE DISCHARGE | End: 2020-02-29
Payer: MEDICAID

## 2020-02-29 VITALS
HEIGHT: 61 IN | RESPIRATION RATE: 30 BRPM | BODY MASS INDEX: 36.25 KG/M2 | DIASTOLIC BLOOD PRESSURE: 95 MMHG | WEIGHT: 192 LBS | TEMPERATURE: 98.3 F | HEART RATE: 107 BPM | SYSTOLIC BLOOD PRESSURE: 135 MMHG | OXYGEN SATURATION: 98 %

## 2020-02-29 PROCEDURE — 73610 X-RAY EXAM OF ANKLE: CPT

## 2020-02-29 PROCEDURE — 4500000002 HC ER NO CHARGE

## 2020-02-29 PROCEDURE — 73630 X-RAY EXAM OF FOOT: CPT

## 2020-02-29 RX ORDER — IBUPROFEN 600 MG/1
600 TABLET ORAL ONCE
Status: DISCONTINUED | OUTPATIENT
Start: 2020-02-29 | End: 2020-02-29 | Stop reason: HOSPADM

## 2020-02-29 RX ORDER — ACETAMINOPHEN 500 MG
1000 TABLET ORAL ONCE
Status: DISCONTINUED | OUTPATIENT
Start: 2020-02-29 | End: 2020-02-29 | Stop reason: HOSPADM

## 2020-02-29 ASSESSMENT — PAIN SCALES - GENERAL: PAINLEVEL_OUTOF10: 10

## 2020-02-29 ASSESSMENT — PAIN DESCRIPTION - LOCATION: LOCATION: ANKLE

## 2020-02-29 ASSESSMENT — PAIN DESCRIPTION - PAIN TYPE: TYPE: ACUTE PAIN

## 2020-02-29 ASSESSMENT — PAIN DESCRIPTION - ORIENTATION: ORIENTATION: LEFT

## 2020-03-01 NOTE — ED PROVIDER NOTES
As PA-in-triage, I performed a medical screening history and physical exam on this patient. HISTORY OF PRESENT ILLNESS  Gertrudis Salas is a 46 y.o. female presents for left foot and ankle pain. States that she fell off of a ladder approximately 4 feet off the ground. Line on the lateral aspect felt a \"snap. \"  Has been nonambulatory since the injury. No head injury or loss of consciousness. Has had reported multiple fractures of the left ankle without history of surgery. Patient is supposed to be on Suboxone therapy, has a history of cocaine and opiate abuse but states that she is no longer taking this medication she stopped seeing the doctor. On [facility] check, just filled 7-day supply of Suboxone on 2/26/2020     PHYSICAL EXAM  BP (!) 135/95   Pulse 107   Temp 98.3 °F (36.8 °C)   Resp 30   Ht 5' 1\" (1.549 m)   Wt 192 lb (87.1 kg)   LMP 02/29/2020   SpO2 98%   BMI 36.28 kg/m²     On exam, the patient appears well-hydrated, well-nourished, and in no acute distress. Mucous membranes are moist. Speech is clear. Breathing is unlabored. Skin is dry. Mental status is normal. The patient has normal gait, moves all extremities, and is without facial droop. Generally soft tissue swelling throughout the lateral ankle, no gross bony deformities. Imaging and medications ordered at triage. Vital signs pending at time of my triage evaluation. Please see ED provider's note for patient complete ED evaluation, labs/imaging interpretation and final disposition/clinical impression.          Akosua Medina PA-C  02/29/20 1950

## 2020-03-01 NOTE — ED PROVIDER NOTES
EMERGENCY DEPARTMENT ENCOUNTER        PCP: Trevor Bajwa MD    [facility]    Chief Complaint   Patient presents with    Ankle Injury     L ankle         This patient was not evaluated by the attending physician. I have independently evaluated this patient . HPI    Little Fraga is a 46 y.o. female who presents with pain localized in the Right ankle. Onset was earlier today. The context is patient fell off the fourth round a ladder and rolled her ankle. REVIEW OF SYSTEMS    Unable to obtain    All other review of systems are negative  See HPI and nursing notes for additional information     PAST MEDICAL & SURGICAL HISTORY    Past Medical History:   Diagnosis Date    [facility]o coma[facility] [address]     Heroin dependence ([facility] [address] [facility]     MRSA infection      Past Surgical History:   Procedure Laterality Date    CHOLECYSTECTOMY      CYST INCISION AND DRAINAGE      numerous places 6 different areas    OTHER SURGICAL HISTORY  02/27/15    I&D of R shoulder AC joint    SHOULDER SURGERY Right     TOE SURGERY      were amputated in accident and sewn back on    TONSILLECTOMY         CURRENT MEDICATIONS    Current Outpatient Rx   Medication Sig Dispense Refill    oxyCODONE (ROXICODONE) 5 MG immediate release tablet Take 5 mg by mouth 3 times daily as needed.  busPIRone (BUSPAR) 7.5 MG tablet Take 7.5 mg by mouth 2 times daily      gabapentin (NEURONTIN) 300 MG capsule Take 300 mg by mouth 3 times daily.       cloNIDine (CATAPRES) 0.1 MG tablet Take 0.1 mg by mouth 2 times daily      mirtazapine (REMERON) 15 MG tablet Take 15 mg by mouth nightly         ALLERGIES    Allergies   Allergen Reactions    Latex Hives    Darvocet [Propoxyphene N-Acetaminophen] Hives    Asa [Aspirin] Hives       SOCIAL & FAMILY HISTORY    Social History     Socioeconomic History    Marital status: Single     Spouse name: None    Number of children: None    Years of education: None    Highest education level: fracture. No fracture noted otherwise         XR ANKLE LEFT (MIN 3 VIEWS)   Final Result   No acute osseous abnormality in the left ankle. Mild soft tissue swelling is   noted      Small bone density along the margin of the distal calcaneus seen best on the   AP view of the foot. This raises the question of a subtle fracture. No fracture noted otherwise               ED COURSE & MEDICAL DECISION MAKING     I went into the room to evaluate the patient for the first time, they were getting loaded up on a wheelchair. They said that the her that the x-ray was negative and they were going home. I was not able to convince him to stay for an evaluation. Elements of physical exam above obtained by watching the patient. History is consistent with ankle sprain. Was not able to complete physical exam, or take a complete history. X-ray shows no acute osseous abnormality of the left ankle, mild soft tissue swelling is noted. Small bone density along the margin of the distal calcaneus seen best on the AP view of the foot. This raises question of a subtle fracture, no fracture noted otherwise. Patient was provided with ice pack and Ace wrap in emergency department today. She refused Tylenol and Motrin. Patient left before I was able to take a complete history or to a physical exam or offer any treatment other than what was offered in triage. Clinical  IMPRESSION    1. Sprain of right ankle, unspecified ligament, initial encounter          Comment: Please note this report has been produced using speech recognition software and may contain errors related to that system including errors in grammar, punctuation, and spelling, as well as words and phrases that may be inappropriate. If there are any questions or concerns please feel free to contact the dictating provider for clarification.        Victor Manuel Bema  02/29/20 2131       [place]  02/29/20 2132

## 2023-03-07 ENCOUNTER — APPOINTMENT (OUTPATIENT)
Dept: GENERAL RADIOLOGY | Age: 43
DRG: 364 | End: 2023-03-07
Attending: INTERNAL MEDICINE
Payer: MEDICAID

## 2023-03-07 ENCOUNTER — ANESTHESIA (OUTPATIENT)
Dept: OPERATING ROOM | Age: 43
End: 2023-03-07
Payer: MEDICAID

## 2023-03-07 ENCOUNTER — HOSPITAL ENCOUNTER (INPATIENT)
Age: 43
LOS: 6 days | Discharge: HOME | DRG: 364 | End: 2023-03-13
Attending: INTERNAL MEDICINE | Admitting: INTERNAL MEDICINE
Payer: MEDICAID

## 2023-03-07 ENCOUNTER — ANESTHESIA EVENT (OUTPATIENT)
Dept: OPERATING ROOM | Age: 43
End: 2023-03-07
Payer: MEDICAID

## 2023-03-07 DIAGNOSIS — L02.91 ABSCESS: ICD-10-CM

## 2023-03-07 PROBLEM — L03.90 CELLULITIS: Status: ACTIVE | Noted: 2023-03-07

## 2023-03-07 LAB
AMPHETAMINES: NEGATIVE
ANION GAP SERPL CALCULATED.3IONS-SCNC: 14 MMOL/L (ref 4–16)
BARBITURATE SCREEN URINE: NEGATIVE
BENZODIAZEPINE SCREEN, URINE: NEGATIVE
BUN SERPL-MCNC: 9 MG/DL (ref 6–23)
CALCIUM SERPL-MCNC: 7.9 MG/DL (ref 8.3–10.6)
CANNABINOID SCREEN URINE: NEGATIVE
CHLORIDE BLD-SCNC: 103 MMOL/L (ref 99–110)
CO2: 20 MMOL/L (ref 21–32)
COCAINE METABOLITE: ABNORMAL
CREAT SERPL-MCNC: 0.7 MG/DL (ref 0.6–1.1)
GFR SERPL CREATININE-BSD FRML MDRD: >60 ML/MIN/1.73M2
GLUCOSE SERPL-MCNC: 137 MG/DL (ref 70–99)
INTERPRETATION: NORMAL
OPIATES, URINE: NEGATIVE
OXYCODONE: NEGATIVE
PHENCYCLIDINE, URINE: NEGATIVE
POTASSIUM SERPL-SCNC: 4.5 MMOL/L (ref 3.5–5.1)
PREGNANCY TEST URINE, POC: NEGATIVE
PREGNANCY, URINE: NEGATIVE
PROCALCITONIN SERPL-MCNC: 0.51 NG/ML
SODIUM BLD-SCNC: 137 MMOL/L (ref 135–145)

## 2023-03-07 PROCEDURE — 6360000002 HC RX W HCPCS: Performed by: INTERNAL MEDICINE

## 2023-03-07 PROCEDURE — 3600000002 HC SURGERY LEVEL 2 BASE: Performed by: SURGERY

## 2023-03-07 PROCEDURE — 81025 URINE PREGNANCY TEST: CPT

## 2023-03-07 PROCEDURE — 0JBJ0ZZ EXCISION OF RIGHT HAND SUBCUTANEOUS TISSUE AND FASCIA, OPEN APPROACH: ICD-10-PCS | Performed by: SURGERY

## 2023-03-07 PROCEDURE — 2580000003 HC RX 258: Performed by: ANESTHESIOLOGY

## 2023-03-07 PROCEDURE — 6360000002 HC RX W HCPCS: Performed by: ANESTHESIOLOGY

## 2023-03-07 PROCEDURE — 10061 I&D ABSCESS COMP/MULTIPLE: CPT | Performed by: SURGERY

## 2023-03-07 PROCEDURE — 80048 BASIC METABOLIC PNL TOTAL CA: CPT

## 2023-03-07 PROCEDURE — 0JBH0ZZ EXCISION OF LEFT LOWER ARM SUBCUTANEOUS TISSUE AND FASCIA, OPEN APPROACH: ICD-10-PCS | Performed by: SURGERY

## 2023-03-07 PROCEDURE — 7100000000 HC PACU RECOVERY - FIRST 15 MIN: Performed by: SURGERY

## 2023-03-07 PROCEDURE — 87205 SMEAR GRAM STAIN: CPT

## 2023-03-07 PROCEDURE — 6360000002 HC RX W HCPCS: Performed by: NURSE ANESTHETIST, CERTIFIED REGISTERED

## 2023-03-07 PROCEDURE — 87077 CULTURE AEROBIC IDENTIFY: CPT

## 2023-03-07 PROCEDURE — 80307 DRUG TEST PRSMV CHEM ANLYZR: CPT

## 2023-03-07 PROCEDURE — 87070 CULTURE OTHR SPECIMN AEROBIC: CPT

## 2023-03-07 PROCEDURE — 3700000001 HC ADD 15 MINUTES (ANESTHESIA): Performed by: SURGERY

## 2023-03-07 PROCEDURE — 3600000012 HC SURGERY LEVEL 2 ADDTL 15MIN: Performed by: SURGERY

## 2023-03-07 PROCEDURE — 0JBP0ZZ EXCISION OF LEFT LOWER LEG SUBCUTANEOUS TISSUE AND FASCIA, OPEN APPROACH: ICD-10-PCS | Performed by: SURGERY

## 2023-03-07 PROCEDURE — 87040 BLOOD CULTURE FOR BACTERIA: CPT

## 2023-03-07 PROCEDURE — 7100000001 HC PACU RECOVERY - ADDTL 15 MIN: Performed by: SURGERY

## 2023-03-07 PROCEDURE — 6360000002 HC RX W HCPCS: Performed by: NURSE PRACTITIONER

## 2023-03-07 PROCEDURE — 2709999900 HC NON-CHARGEABLE SUPPLY: Performed by: SURGERY

## 2023-03-07 PROCEDURE — 76937 US GUIDE VASCULAR ACCESS: CPT

## 2023-03-07 PROCEDURE — 84145 PROCALCITONIN (PCT): CPT

## 2023-03-07 PROCEDURE — 99211 OFF/OP EST MAY X REQ PHY/QHP: CPT

## 2023-03-07 PROCEDURE — 87075 CULTR BACTERIA EXCEPT BLOOD: CPT

## 2023-03-07 PROCEDURE — 1200000000 HC SEMI PRIVATE

## 2023-03-07 PROCEDURE — 0JBN0ZZ EXCISION OF RIGHT LOWER LEG SUBCUTANEOUS TISSUE AND FASCIA, OPEN APPROACH: ICD-10-PCS | Performed by: SURGERY

## 2023-03-07 PROCEDURE — 3700000000 HC ANESTHESIA ATTENDED CARE: Performed by: SURGERY

## 2023-03-07 PROCEDURE — 2500000003 HC RX 250 WO HCPCS: Performed by: NURSE ANESTHETIST, CERTIFIED REGISTERED

## 2023-03-07 PROCEDURE — 71046 X-RAY EXAM CHEST 2 VIEWS: CPT

## 2023-03-07 PROCEDURE — 2580000003 HC RX 258: Performed by: INTERNAL MEDICINE

## 2023-03-07 PROCEDURE — 2580000003 HC RX 258: Performed by: NURSE ANESTHETIST, CERTIFIED REGISTERED

## 2023-03-07 PROCEDURE — 0JBG0ZZ EXCISION OF RIGHT LOWER ARM SUBCUTANEOUS TISSUE AND FASCIA, OPEN APPROACH: ICD-10-PCS | Performed by: SURGERY

## 2023-03-07 PROCEDURE — 36415 COLL VENOUS BLD VENIPUNCTURE: CPT

## 2023-03-07 PROCEDURE — 6370000000 HC RX 637 (ALT 250 FOR IP): Performed by: STUDENT IN AN ORGANIZED HEALTH CARE EDUCATION/TRAINING PROGRAM

## 2023-03-07 PROCEDURE — 94761 N-INVAS EAR/PLS OXIMETRY MLT: CPT

## 2023-03-07 RX ORDER — HYDRALAZINE HYDROCHLORIDE 20 MG/ML
10 INJECTION INTRAMUSCULAR; INTRAVENOUS
Status: DISCONTINUED | OUTPATIENT
Start: 2023-03-07 | End: 2023-03-07 | Stop reason: HOSPADM

## 2023-03-07 RX ORDER — SODIUM CHLORIDE 0.9 % (FLUSH) 0.9 %
5-40 SYRINGE (ML) INJECTION PRN
Status: DISCONTINUED | OUTPATIENT
Start: 2023-03-07 | End: 2023-03-07 | Stop reason: HOSPADM

## 2023-03-07 RX ORDER — DROPERIDOL 2.5 MG/ML
0.62 INJECTION, SOLUTION INTRAMUSCULAR; INTRAVENOUS EVERY 10 MIN PRN
Status: DISCONTINUED | OUTPATIENT
Start: 2023-03-07 | End: 2023-03-13 | Stop reason: HOSPADM

## 2023-03-07 RX ORDER — NICOTINE 21 MG/24HR
1 PATCH, TRANSDERMAL 24 HOURS TRANSDERMAL DAILY
Status: DISCONTINUED | OUTPATIENT
Start: 2023-03-07 | End: 2023-03-13 | Stop reason: HOSPADM

## 2023-03-07 RX ORDER — ACETAMINOPHEN 650 MG/1
650 SUPPOSITORY RECTAL EVERY 6 HOURS PRN
Status: DISCONTINUED | OUTPATIENT
Start: 2023-03-07 | End: 2023-03-13 | Stop reason: HOSPADM

## 2023-03-07 RX ORDER — SODIUM CHLORIDE 0.9 % (FLUSH) 0.9 %
5-40 SYRINGE (ML) INJECTION PRN
Status: DISCONTINUED | OUTPATIENT
Start: 2023-03-07 | End: 2023-03-13 | Stop reason: HOSPADM

## 2023-03-07 RX ORDER — SODIUM CHLORIDE 9 MG/ML
INJECTION, SOLUTION INTRAVENOUS CONTINUOUS PRN
Status: DISCONTINUED | OUTPATIENT
Start: 2023-03-07 | End: 2023-03-07 | Stop reason: SDUPTHER

## 2023-03-07 RX ORDER — KETAMINE HCL 50MG/ML(1)
SYRINGE (ML) INTRAVENOUS PRN
Status: DISCONTINUED | OUTPATIENT
Start: 2023-03-07 | End: 2023-03-07 | Stop reason: SDUPTHER

## 2023-03-07 RX ORDER — FLUOXETINE HYDROCHLORIDE 20 MG/1
20 CAPSULE ORAL 3 TIMES DAILY
COMMUNITY

## 2023-03-07 RX ORDER — ONDANSETRON 2 MG/ML
INJECTION INTRAMUSCULAR; INTRAVENOUS PRN
Status: DISCONTINUED | OUTPATIENT
Start: 2023-03-07 | End: 2023-03-07 | Stop reason: SDUPTHER

## 2023-03-07 RX ORDER — PROPOFOL 10 MG/ML
INJECTION, EMULSION INTRAVENOUS PRN
Status: DISCONTINUED | OUTPATIENT
Start: 2023-03-07 | End: 2023-03-07 | Stop reason: SDUPTHER

## 2023-03-07 RX ORDER — ONDANSETRON 2 MG/ML
4 INJECTION INTRAMUSCULAR; INTRAVENOUS
Status: DISCONTINUED | OUTPATIENT
Start: 2023-03-07 | End: 2023-03-07 | Stop reason: HOSPADM

## 2023-03-07 RX ORDER — SODIUM CHLORIDE, SODIUM LACTATE, POTASSIUM CHLORIDE, CALCIUM CHLORIDE 600; 310; 30; 20 MG/100ML; MG/100ML; MG/100ML; MG/100ML
1000 INJECTION, SOLUTION INTRAVENOUS CONTINUOUS
Status: DISCONTINUED | OUTPATIENT
Start: 2023-03-07 | End: 2023-03-13 | Stop reason: HOSPADM

## 2023-03-07 RX ORDER — ONDANSETRON 2 MG/ML
4 INJECTION INTRAMUSCULAR; INTRAVENOUS EVERY 6 HOURS PRN
Status: DISCONTINUED | OUTPATIENT
Start: 2023-03-07 | End: 2023-03-13 | Stop reason: HOSPADM

## 2023-03-07 RX ORDER — OXYCODONE HYDROCHLORIDE 5 MG/1
5 TABLET ORAL
Status: ACTIVE | OUTPATIENT
Start: 2023-03-07 | End: 2023-03-08

## 2023-03-07 RX ORDER — ENOXAPARIN SODIUM 100 MG/ML
40 INJECTION SUBCUTANEOUS DAILY
Status: DISCONTINUED | OUTPATIENT
Start: 2023-03-07 | End: 2023-03-13 | Stop reason: HOSPADM

## 2023-03-07 RX ORDER — CLONIDINE HYDROCHLORIDE 0.1 MG/1
0.1 TABLET ORAL DAILY
COMMUNITY

## 2023-03-07 RX ORDER — MIDAZOLAM HYDROCHLORIDE 1 MG/ML
INJECTION INTRAMUSCULAR; INTRAVENOUS PRN
Status: DISCONTINUED | OUTPATIENT
Start: 2023-03-07 | End: 2023-03-07 | Stop reason: SDUPTHER

## 2023-03-07 RX ORDER — KETOROLAC TROMETHAMINE 30 MG/ML
15 INJECTION, SOLUTION INTRAMUSCULAR; INTRAVENOUS ONCE
Status: COMPLETED | OUTPATIENT
Start: 2023-03-07 | End: 2023-03-07

## 2023-03-07 RX ORDER — FENTANYL CITRATE 50 UG/ML
INJECTION, SOLUTION INTRAMUSCULAR; INTRAVENOUS PRN
Status: DISCONTINUED | OUTPATIENT
Start: 2023-03-07 | End: 2023-03-07 | Stop reason: SDUPTHER

## 2023-03-07 RX ORDER — ONDANSETRON 4 MG/1
4 TABLET, ORALLY DISINTEGRATING ORAL EVERY 8 HOURS PRN
Status: DISCONTINUED | OUTPATIENT
Start: 2023-03-07 | End: 2023-03-13 | Stop reason: HOSPADM

## 2023-03-07 RX ORDER — FENTANYL CITRATE 50 UG/ML
25 INJECTION, SOLUTION INTRAMUSCULAR; INTRAVENOUS EVERY 5 MIN PRN
Status: DISCONTINUED | OUTPATIENT
Start: 2023-03-07 | End: 2023-03-07 | Stop reason: HOSPADM

## 2023-03-07 RX ORDER — LABETALOL HYDROCHLORIDE 5 MG/ML
10 INJECTION, SOLUTION INTRAVENOUS
Status: DISCONTINUED | OUTPATIENT
Start: 2023-03-07 | End: 2023-03-07 | Stop reason: HOSPADM

## 2023-03-07 RX ORDER — SODIUM CHLORIDE 9 MG/ML
INJECTION, SOLUTION INTRAVENOUS PRN
Status: DISCONTINUED | OUTPATIENT
Start: 2023-03-07 | End: 2023-03-13 | Stop reason: HOSPADM

## 2023-03-07 RX ORDER — DIPHENHYDRAMINE HYDROCHLORIDE 50 MG/ML
12.5 INJECTION INTRAMUSCULAR; INTRAVENOUS
Status: ACTIVE | OUTPATIENT
Start: 2023-03-07 | End: 2023-03-08

## 2023-03-07 RX ORDER — SODIUM CHLORIDE 0.9 % (FLUSH) 0.9 %
5-40 SYRINGE (ML) INJECTION EVERY 12 HOURS SCHEDULED
Status: DISCONTINUED | OUTPATIENT
Start: 2023-03-07 | End: 2023-03-13 | Stop reason: HOSPADM

## 2023-03-07 RX ORDER — POLYETHYLENE GLYCOL 3350 17 G/17G
17 POWDER, FOR SOLUTION ORAL DAILY PRN
Status: DISCONTINUED | OUTPATIENT
Start: 2023-03-07 | End: 2023-03-13 | Stop reason: HOSPADM

## 2023-03-07 RX ORDER — SODIUM CHLORIDE 0.9 % (FLUSH) 0.9 %
5-40 SYRINGE (ML) INJECTION EVERY 12 HOURS SCHEDULED
Status: DISCONTINUED | OUTPATIENT
Start: 2023-03-07 | End: 2023-03-07 | Stop reason: HOSPADM

## 2023-03-07 RX ORDER — MIRTAZAPINE 30 MG/1
30 TABLET, FILM COATED ORAL NIGHTLY
COMMUNITY

## 2023-03-07 RX ORDER — IPRATROPIUM BROMIDE AND ALBUTEROL SULFATE 2.5; .5 MG/3ML; MG/3ML
1 SOLUTION RESPIRATORY (INHALATION)
Status: ACTIVE | OUTPATIENT
Start: 2023-03-07 | End: 2023-03-08

## 2023-03-07 RX ORDER — KETOROLAC TROMETHAMINE 30 MG/ML
15 INJECTION, SOLUTION INTRAMUSCULAR; INTRAVENOUS EVERY 6 HOURS PRN
Status: DISPENSED | OUTPATIENT
Start: 2023-03-07 | End: 2023-03-12

## 2023-03-07 RX ORDER — DEXAMETHASONE SODIUM PHOSPHATE 4 MG/ML
INJECTION, SOLUTION INTRA-ARTICULAR; INTRALESIONAL; INTRAMUSCULAR; INTRAVENOUS; SOFT TISSUE PRN
Status: DISCONTINUED | OUTPATIENT
Start: 2023-03-07 | End: 2023-03-07 | Stop reason: SDUPTHER

## 2023-03-07 RX ORDER — MEPERIDINE HYDROCHLORIDE 25 MG/ML
12.5 INJECTION INTRAMUSCULAR; INTRAVENOUS; SUBCUTANEOUS EVERY 5 MIN PRN
Status: DISCONTINUED | OUTPATIENT
Start: 2023-03-07 | End: 2023-03-07 | Stop reason: HOSPADM

## 2023-03-07 RX ORDER — HYDROXYZINE PAMOATE 25 MG/1
25 CAPSULE ORAL 3 TIMES DAILY PRN
COMMUNITY

## 2023-03-07 RX ORDER — MORPHINE SULFATE 2 MG/ML
2 INJECTION, SOLUTION INTRAMUSCULAR; INTRAVENOUS EVERY 4 HOURS PRN
Status: DISCONTINUED | OUTPATIENT
Start: 2023-03-07 | End: 2023-03-07

## 2023-03-07 RX ORDER — ACETAMINOPHEN 325 MG/1
650 TABLET ORAL EVERY 6 HOURS PRN
Status: DISCONTINUED | OUTPATIENT
Start: 2023-03-07 | End: 2023-03-13 | Stop reason: HOSPADM

## 2023-03-07 RX ADMIN — ONDANSETRON 4 MG: 2 INJECTION INTRAMUSCULAR; INTRAVENOUS at 18:53

## 2023-03-07 RX ADMIN — CEFTRIAXONE SODIUM 1000 MG: 1 INJECTION, POWDER, FOR SOLUTION INTRAMUSCULAR; INTRAVENOUS at 05:24

## 2023-03-07 RX ADMIN — VANCOMYCIN HYDROCHLORIDE 1000 MG: 1 INJECTION, POWDER, LYOPHILIZED, FOR SOLUTION INTRAVENOUS at 13:16

## 2023-03-07 RX ADMIN — FENTANYL CITRATE 100 MCG: 50 INJECTION, SOLUTION INTRAMUSCULAR; INTRAVENOUS at 18:34

## 2023-03-07 RX ADMIN — SODIUM CHLORIDE 50 ML: 9 INJECTION, SOLUTION INTRAVENOUS at 05:24

## 2023-03-07 RX ADMIN — VANCOMYCIN HYDROCHLORIDE 1000 MG: 1 INJECTION, POWDER, LYOPHILIZED, FOR SOLUTION INTRAVENOUS at 22:54

## 2023-03-07 RX ADMIN — SODIUM CHLORIDE, PRESERVATIVE FREE 10 ML: 5 INJECTION INTRAVENOUS at 10:01

## 2023-03-07 RX ADMIN — PROPOFOL 150 MG: 10 INJECTION, EMULSION INTRAVENOUS at 18:07

## 2023-03-07 RX ADMIN — KETOROLAC TROMETHAMINE 15 MG: 30 INJECTION, SOLUTION INTRAMUSCULAR; INTRAVENOUS at 20:28

## 2023-03-07 RX ADMIN — MIDAZOLAM 2 MG: 1 INJECTION INTRAMUSCULAR; INTRAVENOUS at 17:54

## 2023-03-07 RX ADMIN — Medication 15 MG: at 19:00

## 2023-03-07 RX ADMIN — Medication 25 MG: at 18:37

## 2023-03-07 RX ADMIN — FENTANYL CITRATE 50 MCG: 50 INJECTION, SOLUTION INTRAMUSCULAR; INTRAVENOUS at 18:52

## 2023-03-07 RX ADMIN — ENOXAPARIN SODIUM 40 MG: 100 INJECTION SUBCUTANEOUS at 10:01

## 2023-03-07 RX ADMIN — HYDROMORPHONE HYDROCHLORIDE 0.5 MG: 1 INJECTION, SOLUTION INTRAMUSCULAR; INTRAVENOUS; SUBCUTANEOUS at 19:35

## 2023-03-07 RX ADMIN — FENTANYL CITRATE 50 MCG: 50 INJECTION, SOLUTION INTRAMUSCULAR; INTRAVENOUS at 18:20

## 2023-03-07 RX ADMIN — FENTANYL CITRATE 50 MCG: 50 INJECTION, SOLUTION INTRAMUSCULAR; INTRAVENOUS at 18:50

## 2023-03-07 RX ADMIN — Medication 10 MG: at 18:41

## 2023-03-07 RX ADMIN — SODIUM CHLORIDE: 900 INJECTION INTRAVENOUS at 17:52

## 2023-03-07 RX ADMIN — KETOROLAC TROMETHAMINE 15 MG: 30 INJECTION, SOLUTION INTRAMUSCULAR; INTRAVENOUS at 06:52

## 2023-03-07 RX ADMIN — SODIUM CHLORIDE, POTASSIUM CHLORIDE, SODIUM LACTATE AND CALCIUM CHLORIDE 1000 ML: 600; 310; 30; 20 INJECTION, SOLUTION INTRAVENOUS at 20:28

## 2023-03-07 RX ADMIN — HYDROMORPHONE HYDROCHLORIDE 0.5 MG: 1 INJECTION, SOLUTION INTRAMUSCULAR; INTRAVENOUS; SUBCUTANEOUS at 19:49

## 2023-03-07 RX ADMIN — HYDROMORPHONE HYDROCHLORIDE 0.5 MG: 1 INJECTION, SOLUTION INTRAMUSCULAR; INTRAVENOUS; SUBCUTANEOUS at 19:42

## 2023-03-07 RX ADMIN — SODIUM CHLORIDE, PRESERVATIVE FREE 10 ML: 5 INJECTION INTRAVENOUS at 20:28

## 2023-03-07 RX ADMIN — DEXAMETHASONE SODIUM PHOSPHATE 4 MG: 4 INJECTION, SOLUTION INTRAMUSCULAR; INTRAVENOUS at 18:14

## 2023-03-07 RX ADMIN — FENTANYL CITRATE 50 MCG: 50 INJECTION, SOLUTION INTRAMUSCULAR; INTRAVENOUS at 18:14

## 2023-03-07 ASSESSMENT — PAIN DESCRIPTION - ORIENTATION
ORIENTATION: RIGHT;LEFT

## 2023-03-07 ASSESSMENT — PAIN DESCRIPTION - PAIN TYPE
TYPE: ACUTE PAIN
TYPE: SURGICAL PAIN
TYPE: ACUTE PAIN

## 2023-03-07 ASSESSMENT — ENCOUNTER SYMPTOMS
COUGH: 0
SORE THROAT: 0
EYE DISCHARGE: 0
ABDOMINAL DISTENTION: 0
SHORTNESS OF BREATH: 0
CONSTIPATION: 0
WHEEZING: 0
DIARRHEA: 0
BACK PAIN: 0

## 2023-03-07 ASSESSMENT — PAIN - FUNCTIONAL ASSESSMENT
PAIN_FUNCTIONAL_ASSESSMENT: ACTIVITIES ARE NOT PREVENTED
PAIN_FUNCTIONAL_ASSESSMENT: PREVENTS OR INTERFERES SOME ACTIVE ACTIVITIES AND ADLS
PAIN_FUNCTIONAL_ASSESSMENT: PREVENTS OR INTERFERES SOME ACTIVE ACTIVITIES AND ADLS
PAIN_FUNCTIONAL_ASSESSMENT: 0-10
PAIN_FUNCTIONAL_ASSESSMENT: ACTIVITIES ARE NOT PREVENTED
PAIN_FUNCTIONAL_ASSESSMENT: PREVENTS OR INTERFERES SOME ACTIVE ACTIVITIES AND ADLS

## 2023-03-07 ASSESSMENT — PAIN SCALES - GENERAL
PAINLEVEL_OUTOF10: 10
PAINLEVEL_OUTOF10: 7
PAINLEVEL_OUTOF10: 8
PAINLEVEL_OUTOF10: 7

## 2023-03-07 ASSESSMENT — PAIN DESCRIPTION - LOCATION
LOCATION: LEG;ARM
LOCATION: LEG;ARM
LOCATION: ARM;HAND;LEG
LOCATION: HAND;ARM;LEG
LOCATION: HAND;LEG;ARM
LOCATION: ARM;HAND;LEG
LOCATION: ARM;HAND;LEG

## 2023-03-07 ASSESSMENT — PAIN DESCRIPTION - ONSET
ONSET: ON-GOING

## 2023-03-07 ASSESSMENT — PAIN DESCRIPTION - DESCRIPTORS
DESCRIPTORS: DULL;SHARP;STABBING
DESCRIPTORS: DULL;SHARP
DESCRIPTORS: ACHING;DISCOMFORT;SHARP;STABBING
DESCRIPTORS: BURNING;STABBING;SHARP
DESCRIPTORS: DULL;SHARP
DESCRIPTORS: ACHING;DISCOMFORT;SHARP;STABBING
DESCRIPTORS: BURNING;SHARP;STABBING
DESCRIPTORS: BURNING;SHARP;STABBING

## 2023-03-07 ASSESSMENT — PAIN SCALES - WONG BAKER
WONGBAKER_NUMERICALRESPONSE: 6
WONGBAKER_NUMERICALRESPONSE: 6

## 2023-03-07 ASSESSMENT — PAIN DESCRIPTION - FREQUENCY
FREQUENCY: CONTINUOUS

## 2023-03-07 ASSESSMENT — LIFESTYLE VARIABLES: SMOKING_STATUS: 1

## 2023-03-07 NOTE — H&P
V2.0  History and Physical      Name:  Rg Renteria /Age/Sex: 1980  (44 y.o. female)   MRN & CSN:  [id number] & [id number] Encounter Date/Time: 3/7/2023 4:56 AM EST   Location:  [address] [place] [address] PCP: No primary care provider on file. Hospital Day: 1    Assessment and Plan:   Rg Renteria is a 44 y.o. female with a pmh of polysubstance use who presents with Cellulitis    Hospital Problems             Last Modified POA    * (Principal) Cellulitis 3/7/2023 Yes       #Purulent cellulitis of BLE and BUE  -has multiple areas of induration on upper and Les  -no fevers  -started ceftriaxone and vancomycin (given patient's h/o IV drug use, will be a high risk for s aureus)  -general surgery consult for possible I&D    #Possible R-sided pneumonia  -patient with coughing with sputum production  -dx with pneumonia at [facility] ED  -CXR  -already on antibiotics, can add on azithromycin for atypical coverage if needed    #Polysubstance use disorder  -uses tobacco, crack cocaine, IV heroin, marijuana  - on stopping    Disposition:   Current Living situation: home  Expected Disposition: home  Estimated D/C: 2 days    Diet Diet NPO   DVT Prophylaxis [x] Lovenox, []  Heparin, [] SCDs, [] Ambulation,  [] Eliquis, [] Xarelto   Code Status Full Code   Surrogate Decision Maker/ POA      History from:     patient    History of Present Illness:     Chief Complaint: Cellulitis  Rg Renteria is a 44 y.o. female with pmh of polysubstance use who presents with multiple abscesses on BLE and BUE. Patient is an IV drug user which is the origin of most of the abscesses. Patient also endorses having a productive cough and states that they diagnosed her with pneumonia at [facility] ED as well. She endorses chest pain associated with coughing, and having a mild sore throat. Patient is a 2-3 PPD smoker since age 16. She uses heroin and crack cocaine and rarely uses marijuana.      She denies fever, chills, nausea, vomiting, abd pain, changes in urination. Review of Systems: Need 10 Elements   Review of Systems    As above     Objective:   No intake or output data in the 24 hours ending 03/07/23 0456   Vitals:   Vitals:    03/07/23 0418   BP: 116/76   Pulse: 61   Resp: 16   Temp: 98.4 °F (36.9 °C)   TempSrc: Oral   SpO2: 99%       Medications Prior to Admission     Prior to Admission medications    Not on File       Physical Exam: Need 8 Elements   Physical Exam     General: NAD, AAO x3-4, smells of cigarette smoke  Eyes: EOMI  HENT: Atraumatic  CVS: Normal S1 and S2, no murmurs, RRR  Respiratory: decreased breath sounds on the right, no respiratory distress  GI: Normal bowel sounds, soft, nondistended, nontender  MSK:  no lower extremity edema  Skin: Intact, dry, warm, no rashes  Neuro: CN II to XII grossly intact  Psych: Normal mood      Past Medical History:   PMHx No past medical history on file. PSHX:  has no past surgical history on file. Allergies: Not on File  Fam HX: Breast cancer, ovarian cancer, heart disease, CHF family history is not on file. Soc HX: Smokes 2 to 3 packs/day since age 16, no alcohol use, uses IV heroin, crack cocaine and rare marijuana use  Social History     Socioeconomic History    Marital status: Single       Medications:   Medications:    sodium chloride flush  5-40 mL IntraVENous 2 times per day    enoxaparin  40 mg SubCUTAneous Daily    cefTRIAXone (ROCEPHIN) IV  1,000 mg IntraVENous Q24H      Infusions:    sodium chloride       PRN Meds: sodium chloride flush, 5-40 mL, PRN  sodium chloride, , PRN  ondansetron, 4 mg, Q8H PRN   Or  ondansetron, 4 mg, Q6H PRN  polyethylene glycol, 17 g, Daily PRN  acetaminophen, 650 mg, Q6H PRN   Or  acetaminophen, 650 mg, Q6H PRN        Labs      CBC: No results for input(s): WBC, HGB, PLT in the last 72 hours. BMP:  No results for input(s): NA, K, CL, CO2, BUN, CREATININE, GLUCOSE in the last 72 hours.   Hepatic: No results for input(s): AST, ALT, ALB, BILITOT, ALKPHOS in the last 72 hours. Lipids: No results found for: CHOL, HDL, TRIG  Hemoglobin A1C: No results found for: LABA1C  TSH: No results found for: TSH  Troponin: No results found for: TROPONINT  Lactic Acid: No results for input(s): LACTA in the last 72 hours. BNP: No results for input(s): PROBNP in the last 72 hours. UA:No results found for: [place], PHUR, LABCAST, WBCUA, RBCUA, MUCUS, TRICHOMONAS, YEAST, BACTERIA, CLARITYU, SPECGRAV, LEUKOCYTESUR, UROBILINOGEN, BILIRUBINUR, BLOODU, GLUCOSEU, KETUA, AMORPHOUS  Urine Cultures: No results found for: LABURIN  Blood Cultures: No results found for: BC  No results found for: BLOODCULT2  Organism: No results found for: ORG    Imaging/Diagnostics Last 24 Hours   No results found.       Electronically signed by Melinda Nuñez MD on 3/7/2023 at 4:56 AM

## 2023-03-07 NOTE — CONSULTS
[facility]  consulted by Dr. Madelyn Rogers for monitoring and adjustment. Indication for treatment: Vancomycin indication: SSTI with risk factors   Goal trough: Trough Goal: 10-15 mcg/mL  AUC/ANGLE: <500    Risk Factors for MRSA Identified:   Current or recent history of IVDA, Purulent and/or complicated SSTI    Pertinent Laboratory Values:   Temp Readings from Last 3 Encounters:   03/07/23 97.9 °F (36.6 °C) (Oral)     No results for input(s): WBC, LACTATE in the last 72 hours. Recent Labs     03/07/23  0635   BUN 9   CREATININE 0.7     Estimated Creatinine Clearance: 97 mL/min (based on SCr of 0.7 mg/dL). No intake or output data in the 24 hours ending 03/07/23 1030    Pertinent Cultures:   Date    Source    Results  3/7      Blood    Ordered    Vancomycin level:   TROUGH:  No results for input(s): VANCOTROUGH in the last 72 hours. RANDOM:  No results for input(s): VANCORANDOM in the last 72 hours. Assessment:  HPI: Nakul Sher is a 44 y.o. female with a pmh of polysubstance use who presents with Cellulitis. General surgery consult for possible I&D. Also noted to have possible pneumonia. SCr, BUN, and urine output: SCR appears at baseline @0.7, no UOP data  Day(s) of therapy: 1 of 7  Vancomycin concentration:   3/8 - random @06:00    Plan:  Start vancomycin 1000mg ivpb q12h for a predicted trough of 12.6 at steady state  Check the vanco level tomorrow am  Follow culture results when available. Pharmacy will continue to monitor patient and adjust therapy as indicated    Daily 3 3/8 @06:00    Thank you for the consult. [facility]  3/7/2023 10:30 AM

## 2023-03-07 NOTE — PROGRESS NOTES
4 Eyes Skin Assessment     NAME:  Jesus Stevens  YOB: 1980  MEDICAL RECORD NUMBER:  [id number]    The patient is being assessed for  Admission    I agree that One RN has performed a thorough Head to Toe Skin Assessment on the patient. ALL assessment sites listed below have been assessed. Areas assessed by both nurses:    Head, Face, Ears, Shoulders, Back, Chest, Arms, Elbows, Hands, Sacrum. Buttock, Coccyx, Ischium, and Legs. Feet and Heels        Does the Patient have a Wound? Yes wound(s) were present on assessment.  LDA wound assessment was Initiated and completed by RN       Leo Prevention initiated by RN: NA   Wound Care Orders initiated by RN: No    Pressure Injury (Stage 3,4, Unstageable, DTI, NWPT, and Complex wounds) if present, place referral order by RN under : Yes    New and Established Ostomies, if present place, referral order under : NA      Nurse 1 eSignature: Electronically signed by Liliana Stanley RN on 3/7/23 at 5:37 AM EST    **SHARE this note so that the co-signing nurse can place an eSignature**    Nurse 2 eSignature: Electronically signed by Thomas Walter RN on 3/7/23 at 5:40 AM EST

## 2023-03-07 NOTE — PROGRESS NOTES
Comprehensive Nutrition Assessment    Type and Reason for Visit:  Initial, Positive Nutrition Screen (wt loss, poor intake)    Nutrition Recommendations/Plan:   Advance to Regular Diet as timely as able  Low jayden high protein oral nutrition supplement available/appropriate, once diet advanced     Malnutrition Assessment:  Malnutrition Status:  Insufficient data (03/07/23 1437)    Context:  Social/Environmental Circumstances       Nutrition Assessment:    Pt admitted with purulent cellulitis of BLE and BUE. H/O polysubstance use disorder. Wounds noted. She reports poor po intake and wt loss PTA. Currently NPO, receiving Wound Care during room visit. DARCY wt loss w/o wt history. Will continue to follow as high nutrition risk pending diet advancement. Nutrition Related Findings:    Glu 137   Wound Type: Multiple, Venous Stasis, Open Wounds       Current Nutrition Intake & Therapies:    Average Meal Intake: NPO  Average Supplements Intake: NPO  Diet NPO    Anthropometric Measures:  Height: 5' 1\" (154.9 cm)  Ideal Body Weight (IBW): 105 lbs (48 kg)    Admission Body Weight: 163 lb 8 oz (74.2 kg)  Current Body Weight: 163 lb 8 oz (74.2 kg),   IBW. Current BMI (kg/m2): 30.9  Usual Body Weight:  (n/a)                       BMI Categories: Obese Class 1 (BMI 30.0-34. 9)    Estimated Daily Nutrient Needs:  Energy Requirements Based On: Formula  Weight Used for Energy Requirements: Current  Energy (kcal/day): 1606 (MSJ)  Weight Used for Protein Requirements: Ideal  Protein (g/day): 57-67 (1.2-1.4 g/kg IBW)  Method Used for Fluid Requirements: 1 ml/kcal  Fluid (ml/day): 1600    Nutrition Diagnosis:   Predicted inadequate energy intake related to increase demand for energy/nutrients as evidenced by poor intake prior to admission, wounds    Nutrition Interventions:   Food and/or Nutrient Delivery: Start Oral Diet, Start Oral Nutrition Supplement  Nutrition Education/Counseling: No recommendation at this time  Coordination of Nutrition Care: Continue to monitor while inpatient       Goals:     Goals: Initiate PO diet       Nutrition Monitoring and Evaluation:   Behavioral-Environmental Outcomes: None Identified  Food/Nutrient Intake Outcomes: Diet Advancement/Tolerance  Physical Signs/Symptoms Outcomes: Biochemical Data, GI Status, Meal Time Behavior, Skin, Weight    Discharge Planning:     Too soon to determine     Leonor Aiken, [address], [place]  Contact: [phone]

## 2023-03-07 NOTE — ANESTHESIA PRE PROCEDURE
Department of Anesthesiology  Preprocedure Note       Name:  Joshua Best   Age:  44 y.o.  :  1980                                          MRN:  [id number]         Date:  3/7/2023      Surgeon: Aj Mead):  Amy Cramer DO    Procedure: Procedure(s):  UPPER AND LOWER EXTREMEITIES DEBRIDEMENT INCISION AND DRAINAGE    Medications prior to admission:   Prior to Admission medications    Medication Sig Start Date End Date Taking?  Authorizing Provider   cloNIDine (CATAPRES) 0.1 MG tablet Take 0.1 mg by mouth daily   Yes Historical Provider, MD   hydrOXYzine pamoate (VISTARIL) 25 MG capsule Take 25 mg by mouth 3 times daily as needed for Itching or Anxiety   Yes Historical Provider, MD   mirtazapine (REMERON) 30 MG tablet Take 30 mg by mouth nightly   Yes Historical Provider, MD   FLUoxetine (PROZAC) 20 MG capsule Take 20 mg by mouth in the morning, at noon, and at bedtime   Yes Historical Provider, MD       Current medications:    Current Facility-Administered Medications   Medication Dose Route Frequency Provider Last Rate Last Admin    sodium chloride flush 0.9 % injection 5-40 mL  5-40 mL IntraVENous 2 times per day Jerman Keene MD   10 mL at 23 1001    sodium chloride flush 0.9 % injection 5-40 mL  5-40 mL IntraVENous PRN Jerman Keene MD        0.9 % sodium chloride infusion   IntraVENous PRN Jerman Keene  mL/hr at 23 0524 50 mL at 23 0524    enoxaparin (LOVENOX) injection 40 mg  40 mg SubCUTAneous Daily Torri Lerma MD   40 mg at 23 1001    ondansetron (ZOFRAN-ODT) disintegrating tablet 4 mg  4 mg Oral Q8H PRN Jerman Keene MD        Or    ondansetron (ZOFRAN) injection 4 mg  4 mg IntraVENous Q6H PRN Jerman Keene MD        polyethylene glycol (GLYCOLAX) packet 17 g  17 g Oral Daily PRN Jerman Keene MD        acetaminophen (TYLENOL) tablet 650 mg  650 mg Oral Q6H PRN Jerman Keene MD        Or    acetaminophen (TYLENOL) suppository 650 mg  650 mg Rectal Q6H PRN Priti Mercado MD        cefTRIAXone (ROCEPHIN) 1,000 mg in sodium chloride 0.9 % 50 mL IVPB (mini-bag)  1,000 mg IntraVENous Q24H Priti Mercado MD   Stopped at 03/07/23 0615    ketorolac (TORADOL) injection 15 mg  15 mg IntraVENous Q6H PRN Sheela Al, APRN - CNP        vancomycin (VANCOCIN) 1,000 mg in sodium chloride 0.9 % 250 mL IVPB (Upwu8Vsq)  1,000 mg IntraVENous Q12H Torri Lerma  mL/hr at 03/07/23 1316 1,000 mg at 03/07/23 1316    nicotine (NICODERM CQ) 21 MG/24HR 1 patch  1 patch TransDERmal Daily Nayely Cormier MD           Allergies: Allergies   Allergen Reactions    Other Hives     Darvocet     Propoxyphene Hives       Problem List:    Patient Active Problem List   Diagnosis Code    Cellulitis L03.90       Past Medical History:        Diagnosis Date    History of smoking at least 2 packs per day for at least 15 years        Past Surgical History:  History reviewed. No pertinent surgical history. Social History:    Social History     Tobacco Use    Smoking status: Not on file    Smokeless tobacco: Not on file   Substance Use Topics    Alcohol use: Not on file                                Counseling given: Not Answered      Vital Signs (Current):   Vitals:    03/07/23 0418 03/07/23 0511 03/07/23 0945 03/07/23 1420   BP: 116/76  94/61 137/86   Pulse: 61  63 62   Resp: 16  18 17   Temp: 36.9 °C (98.4 °F)  36.6 °C (97.9 °F) 36.9 °C (98.5 °F)   TempSrc: Oral  Oral Oral   SpO2: 99%  96% 97%   Weight:  163 lb 8 oz (74.2 kg)     Height:  5' 1\" (1.549 m)                                                BP Readings from Last 3 Encounters:   03/07/23 137/86       NPO Status:                                                                                 BMI:   Wt Readings from Last 3 Encounters:   03/07/23 163 lb 8 oz (74.2 kg)     Body mass index is 30.89 kg/m².     CBC: No results found for: WBC, RBC, HGB, HCT, MCV, RDW, PLT    CMP:   Lab Results   Component Value Date/Time     03/07/2023 06:35 AM    K 4.5 03/07/2023 06:35 AM     03/07/2023 06:35 AM    CO2 20 03/07/2023 06:35 AM    BUN 9 03/07/2023 06:35 AM    CREATININE 0.7 03/07/2023 06:35 AM    LABGLOM >60 03/07/2023 06:35 AM    GLUCOSE 137 03/07/2023 06:35 AM    CALCIUM 7.9 03/07/2023 06:35 AM       POC Tests: No results for input(s): POCGLU, POCNA, POCK, POCCL, POCBUN, POCHEMO, POCHCT in the last 72 hours. Coags: No results found for: PROTIME, INR, APTT    HCG (If Applicable): No results found for: PREGTESTUR, PREGSERUM, HCG, HCGQUANT     ABGs: No results found for: PHART, PO2ART, XSB0SZJ, AGK0OVV, BEART, [id number]     Type & Screen (If Applicable):  No results found for: LABABO, LABRH    Drug/Infectious Status (If Applicable):  No results found for: HIV, HEPCAB    COVID-19 Screening (If Applicable): No results found for: COVID19        Anesthesia Evaluation  Patient summary reviewed no history of anesthetic complications:   Airway: Mallampati: II  TM distance: >3 FB   Neck ROM: full  Mouth opening: > = 3 FB   Dental:      Comment: Multiple extremely vulnerable/loose teeth    Pulmonary:   (+) rhonchi current smoker                           Cardiovascular:  Exercise tolerance: good (>4 METS),   (+) valvular problems/murmurs:,          Beta Blocker:  Not on Beta Blocker      ROS comment: History in past od endocarditis and multiple septic joints, etc       Neuro/Psych:   (+) depression/anxiety             GI/Hepatic/Renal: Neg GI/Hepatic/Renal ROS            Endo/Other:                     Abdominal:             Vascular: Other Findings:           Anesthesia Plan      general     ASA 4     (Pre-reviewed 3/7/23)  Induction: intravenous. MIPS: Postoperative opioids intended.                         REMY Naik - KIMBERLEY   3/7/2023

## 2023-03-07 NOTE — CARE COORDINATION
Case Management Assessment  Initial Evaluation    Date/Time of Evaluation: 3/7/2023 10:23 AM  Assessment Completed by: KVNG Johnson    If patient is discharged prior to next notation, then this note serves as note for discharge by case management. Patient Name: Marva Chapman                   YOB: 1980  Diagnosis: Cellulitis [L03.90]                   Date / Time: 3/7/2023  4:16 AM    Patient Admission Status: Inpatient   Readmission Risk (Low < 19, Mod (19-27), High > 27): Readmission Risk Score: 5.3    Current PCP: No primary care provider on file. PCP verified by CM? No (PCP list added to discharge instructions)    Chart Reviewed: Yes      History Provided by: Patient  Patient Orientation: Alert and Oriented    Patient Cognition: Alert    Hospitalization in the last 30 days (Readmission):  No    If yes, Readmission Assessment in CM Navigator will be completed. Advance Directives:      Code Status: Full Code   Patient's Primary Decision Maker is: Patient Declined (Legal Next of Kin Remains as Decision Maker)      Discharge Planning:    Patient lives with: Spouse/Significant Other Type of Home: Homeless  Primary Care Giver: Self  Patient Support Systems include: Spouse/Significant Other   Current Financial resources: Other (Comment) (self pay)  Current community resources: None  Current services prior to admission: None            Current DME:              Type of Home Care services:  None    ADLS  Prior functional level: Independent in ADLs/IADLs  Current functional level: Independent in ADLs/IADLs    PT AM-PAC:   /24  OT AM-PAC:   /24    Family can provide assistance at DC: Yes  Would you like Case Management to discuss the discharge plan with any other family members/significant others, and if so, who?  No  Plans to Return to Present Housing: Other (see comment) (Pt living in [facility] with SO)  Other Identified Issues/Barriers to RETURNING to current housing: general surgery consult  Potential Assistance needed at discharge: N/A            Potential DME:  none  Patient expects to discharge to: Unknown  Plan for transportation at discharge:  significant other    Financial    Payor: /     Does insurance require precert for SNF: No    Potential assistance Purchasing Medications:    Meds-to-Beds request: Yes      [id number] [place], [address] - F [phone] [address] [facility] [address]  Phone: [phone] Fax: [phone]      Notes:    Factors facilitating achievement of predicted outcomes: Friend support, Motivated, Cooperative, and Pleasant    Barriers to discharge: general surgery consult    Additional Case Management Notes: CM in to see Pt to initiate discharge planning. Pt states she is currently living in a [place] with her fiance. Pt states she last used drugs a week ago. She denies the need for drug rehab resources stating she is quitting on her own. Income based housing resources provided. Pt states she has been trying to contact [employer] [facility] to [facility] homeless shelter. Pt denies any needs at this time. The Plan for Transition of Care is related to the following treatment goals of Cellulitis [L03.90]    The Patient and/or Patient Representative Agree with the Discharge Plan?   Yes     [facility]  Case Management Department  Ph: [phone]

## 2023-03-07 NOTE — PROGRESS NOTES
V2.0  [facility] Hospitalist Progress Note      Name:  Adam Bains /Age/Sex: 1980  (44 y.o. female)   MRN & CSN:  [id number] & [id number] Encounter Date/Time: 3/7/2023 9:37 AM EST    Location:  [address] [place] [address] PCP: No primary care provider on file. Hospital Day: 1    Assessment and Plan:   Adam Bains is a 44 y.o. female with pmh of polysubstance use who presents with Cellulitis      Plan:  #Purulent cellulitis of BLE and BUE  -has multiple areas of induration on upper and Les  -no fevers  -started ceftriaxone and vancomycin (given patient's h/o IV drug use, will be a high risk for s aureus)  -general surgery consult for possible I&D. Nathan Choudhary History of polysubstance abuse       #Possible R-sided pneumonia  -patient with coughing with sputum production  -dx with pneumonia at [facility] ED  -CXR  -already on antibiotics, can add on azithromycin for atypical coverage if needed     #Polysubstance use disorder  -uses tobacco, crack cocaine, IV heroin, marijuana  --UDS not ordered on admission, ordered today  -Last reported drug use 1 week ago  - on stopping    Diet Diet NPO   DVT Prophylaxis [x] Lovenox, []  Heparin, [] SCDs, [] Ambulation,  [] Eliquis, [] Xarelto  [] Coumadin   Code Status Full Code   Disposition From: Home  Expected Disposition: Home  Estimated Date of Discharge: 2-3 days  Patient requires continued admission due to PNA   Surrogate Decision Maker/ POA      Subjective:     Chief Complaint: No chief complaint on file. Seen and evaluated at bedside. Patient appears lethargic. Although able to converse appropriately. Admits to using illicit drugs 1 week ago. Patient states she usually injects however states that she uses clean needles on injecting at the site of her current cellulitis lesions. States she has not used any drugs for the past 1 week. Also admits to snorting.          Review of Systems:    Review of Systems   Constitutional:  Positive for activity change, appetite change and fatigue. Negative for diaphoresis. HENT:  Negative for congestion and sore throat. Eyes:  Negative for discharge and visual disturbance. Respiratory:  Negative for cough, shortness of breath and wheezing. Cardiovascular:  Negative for chest pain, palpitations and leg swelling. Gastrointestinal:  Negative for abdominal distention, constipation and diarrhea. Genitourinary:  Negative for difficulty urinating and hematuria. Musculoskeletal:  Positive for arthralgias. Negative for back pain and gait problem. Skin:  Positive for rash and wound. Neurological:  Negative for dizziness, syncope and speech difficulty. Hematological:  Negative for adenopathy. Psychiatric/Behavioral:  Negative for agitation and confusion. All other systems reviewed and are negative. Objective:   No intake or output data in the 24 hours ending 03/07/23 0937     Vitals:   Vitals:    03/07/23 0418   BP: 116/76   Pulse: 61   Resp: 16   Temp: 98.4 °F (36.9 °C)   SpO2: 99%       Physical Exam:   Physical Exam  Vitals and nursing note reviewed. Constitutional:       General: She is not in acute distress. Appearance: She is ill-appearing. HENT:      Head: Normocephalic and atraumatic. Nose: Nose normal.      Mouth/Throat:      Mouth: Mucous membranes are moist.   Eyes:      Extraocular Movements: Extraocular movements intact. Pupils: Pupils are equal, round, and reactive to light. Cardiovascular:      Rate and Rhythm: Normal rate and regular rhythm. Pulses: Normal pulses. Heart sounds: Normal heart sounds. Pulmonary:      Effort: Pulmonary effort is normal.      Breath sounds: Normal breath sounds. Abdominal:      Palpations: Abdomen is soft. Musculoskeletal:         General: Normal range of motion. Cervical back: Normal range of motion. Comments: Right hand erythematous wound, approximately 4 x 4 cm on dorsal aspect of right hand.   Bilateral lower extremity cellulitis around anterior medial knee. Skin:     General: Skin is warm. Capillary Refill: Capillary refill takes less than 2 seconds. Neurological:      General: No focal deficit present. Mental Status: She is alert and oriented to person, place, and time. Psychiatric:         Mood and Affect: Mood normal.         Behavior: Behavior normal.          Medications:   Medications:    sodium chloride flush  5-40 mL IntraVENous 2 times per day    enoxaparin  40 mg SubCUTAneous Daily    cefTRIAXone (ROCEPHIN) IV  1,000 mg IntraVENous Q24H      Infusions:    sodium chloride 50 mL (03/07/23 0524)     PRN Meds: sodium chloride flush, 5-40 mL, PRN  sodium chloride, , PRN  ondansetron, 4 mg, Q8H PRN   Or  ondansetron, 4 mg, Q6H PRN  polyethylene glycol, 17 g, Daily PRN  acetaminophen, 650 mg, Q6H PRN   Or  acetaminophen, 650 mg, Q6H PRN  ketorolac, 15 mg, Q6H PRN        Labs      Recent Results (from the past 24 hour(s))   Basic Metabolic Panel w/ Reflex to MG    Collection Time: 03/07/23  6:35 AM   Result Value Ref Range    Sodium 137 135 - 145 MMOL/L    Potassium 4.5 3.5 - 5.1 MMOL/L    Chloride 103 99 - 110 mMol/L    CO2 20 (L) 21 - 32 MMOL/L    Anion Gap 14 4 - 16    BUN 9 6 - 23 MG/DL    Creatinine 0.7 0.6 - 1.1 MG/DL    Est, Glom Filt Rate >60 >60 mL/min/1.73m2    Glucose 137 (H) 70 - 99 MG/DL    Calcium 7.9 (L) 8.3 - 10.6 MG/DL        Imaging/Diagnostics Last 24 Hours   XR CHEST (2 VW)    Result Date: 3/7/2023  EXAMINATION: TWO XRAY VIEWS OF THE CHEST 3/7/2023 8:05 am COMPARISON: None. HISTORY: ORDERING SYSTEM PROVIDED HISTORY: abnormal breath sounds on right lung TECHNOLOGIST PROVIDED HISTORY: Reason for exam:->abnormal breath sounds on right lung Reason for Exam: abnormal breath sounds on right lung FINDINGS: Cardiac silhouette appears unremarkable. Consolidation at the right lung base which appears to involve the right middle and lower lobes suggesting pneumonia. No pleural effusion.   No pneumothorax. Left lung is clear. Postoperative changes of cholecystectomy. Osseous structures appear intact. 1. Consolidation at the right lung base which appears to involve both the right middle and lower lobes. Findings most suggestive of pneumonia.        Electronically signed by Justyna Child MD on 3/7/2023 at 9:37 AM

## 2023-03-07 NOTE — CONSULTS
[facility] 75 Continence Nurse  Consult Note       Vicente King  AGE: 44 y.o. GENDER: female  : 1980  TODAY'S DATE:  3/7/2023    Subjective:     Reason for Evaluation and Assessment: wound care shivani King is a 44 y.o. female referred by:   [x] Physician  [] Nursing  [] Other:     Wound Identification:  Wound Type:  iv drug use   Contributing Factors: substance abuse        PAST MEDICAL HISTORY        Diagnosis Date    History of smoking at least 2 packs per day for at least 15 years        PAST SURGICAL HISTORY    History reviewed. No pertinent surgical history. FAMILY HISTORY    History reviewed. No pertinent family history. SOCIAL HISTORY         ALLERGIES    Allergies   Allergen Reactions    Other Hives     Darvocet     Propoxyphene Hives       MEDICATIONS    No current facility-administered medications on file prior to encounter.      Current Outpatient Medications on File Prior to Encounter   Medication Sig Dispense Refill    cloNIDine (CATAPRES) 0.1 MG tablet Take 0.1 mg by mouth daily      hydrOXYzine pamoate (VISTARIL) 25 MG capsule Take 25 mg by mouth 3 times daily as needed for Itching or Anxiety      mirtazapine (REMERON) 30 MG tablet Take 30 mg by mouth nightly      FLUoxetine (PROZAC) 20 MG capsule Take 20 mg by mouth in the morning, at noon, and at bedtime           Objective:      /86   Pulse 62   Temp 98.5 °F (36.9 °C) (Oral)   Resp 17   Ht 5' 1\" (1.549 m)   Wt 163 lb 8 oz (74.2 kg)   SpO2 97%   BMI 30.89 kg/m²   Leo Risk Score: Leo Scale Score: 19    LABS    CBC: No results found for: WBC, RBC, HGB, HCT, MCV, MCH, MCHC, RDW, PLT, MPV  CMP:    Lab Results   Component Value Date/Time     2023 06:35 AM    K 4.5 2023 06:35 AM     2023 06:35 AM    CO2 20 2023 06:35 AM    BUN 9 2023 06:35 AM    CREATININE 0.7 2023 06:35 AM    LABGLOM >60 2023 06:35 AM    GLUCOSE 137 2023 06:35 AM CALCIUM 7.9 03/07/2023 06:35 AM     Albumin:  No results found for: LABALBU  PT/INR:  No results found for: PROTIME, INR  HgBA1c:  No results found for: LABA1C      Assessment:     Patient Active Problem List   Diagnosis    Cellulitis       Measurements:  Wound 03/07/23 Leg Right medial (Active)   Wound Image   03/07/23 1348   Wound Etiology Other 03/07/23 1348   Dressing Status New dressing applied 03/07/23 1348   Wound Cleansed Cleansed with saline 03/07/23 1348   Dressing/Treatment Non adherent; Hydrofiber Ag;ABD;Roll gauze 03/07/23 1348   Wound Length (cm) 1.5 cm 03/07/23 1348   Wound Width (cm) 2 cm 03/07/23 1348   Wound Depth (cm) 1 cm 03/07/23 1348   Wound Surface Area (cm^2) 3 cm^2 03/07/23 1348   Wound Volume (cm^3) 3 cm^3 03/07/23 1348   Distance Tunneling (cm) 0 cm 03/07/23 1348   Tunneling Position ___ O'Clock 0 03/07/23 1348   Undermining Starts ___ O'Clock 0 03/07/23 1348   Undermining Ends___ O'Clock 0 03/07/23 1348   Undermining Maxium Distance (cm) 0 03/07/23 1348   Wound Assessment Pink/red;Slough 03/07/23 1348   Drainage Amount Moderate 03/07/23 1348   Drainage Description Purulent 03/07/23 1348   Odor None 03/07/23 1348   Janet-wound Assessment Intact 03/07/23 1348   Margins Defined edges 03/07/23 1348   Wound Thickness Description not for Pressure Injury Full thickness 03/07/23 1348   Number of days: 0       Wound 03/07/23 Pretibial Left;Proximal Open ulcers x2 (Active)   Wound Image   03/07/23 1348   Wound Etiology Other 03/07/23 1348   Dressing Status New dressing applied 03/07/23 1348   Wound Cleansed Cleansed with saline 03/07/23 1348   Dressing/Treatment Non adherent; Hydrofiber Ag;ABD;Roll gauze 03/07/23 1348   Wound Length (cm) 9.5 cm 03/07/23 1348   Wound Width (cm) 5 cm 03/07/23 1348   Wound Depth (cm) 0.2 cm 03/07/23 1348   Wound Surface Area (cm^2) 47.5 cm^2 03/07/23 1348   Wound Volume (cm^3) 9.5 cm^3 03/07/23 1348   Distance Tunneling (cm) 0 cm 03/07/23 1348   Tunneling Position ___ O'Clock 0 03/07/23 1348   Undermining Starts ___ O'Clock 0 03/07/23 1348   Undermining Ends___ O'Clock 0 03/07/23 1348   Undermining Maxium Distance (cm) 0 03/07/23 1348   Wound Assessment Pink/red;Slough 03/07/23 1348   Drainage Amount Large 03/07/23 1348   Drainage Description Purulent 03/07/23 1348   Odor None 03/07/23 1348   Janet-wound Assessment Intact 03/07/23 1348   Margins Attached edges 03/07/23 1348   Wound Thickness Description not for Pressure Injury Full thickness 03/07/23 1348   Number of days: 0       Wound 03/07/23 Radial Left (Active)   Wound Image   03/07/23 1348   Wound Etiology Other 03/07/23 1348   Dressing Status New dressing applied 03/07/23 1348   Wound Cleansed Cleansed with saline 03/07/23 1348   Dressing/Treatment Non adherent; [employer] Ag;Silicone border 31/36/81 1348   Wound Length (cm) 0.6 cm 03/07/23 1348   Wound Width (cm) 1 cm 03/07/23 1348   Wound Depth (cm) 0.1 cm 03/07/23 1348   Wound Surface Area (cm^2) 0.6 cm^2 03/07/23 1348   Wound Volume (cm^3) 0.06 cm^3 03/07/23 1348   Distance Tunneling (cm) 0 cm 03/07/23 1348   Tunneling Position ___ O'Clock 0 03/07/23 1348   Undermining Starts ___ O'Clock 0 03/07/23 1348   Undermining Ends___ O'Clock 0 03/07/23 1348   Undermining Maxium Distance (cm) 0 03/07/23 1348   Wound Assessment Pink/red 03/07/23 1348   Drainage Amount Small 03/07/23 1348   Drainage Description Purulent 03/07/23 1348   Odor None 03/07/23 1348   Janet-wound Assessment Edematous 03/07/23 1348   Margins Defined edges 03/07/23 1348   Wound Thickness Description not for Pressure Injury Partial thickness 03/07/23 1348   Number of days: 0       Wound 03/07/23 Hand Right;Dorsal (Active)   Wound Etiology Other 03/07/23 1348   Dressing/Treatment Open to air 03/07/23 1348   Wound Assessment Pink/red 03/07/23 1348   Drainage Amount None 03/07/23 1348   Number of days: 0       Wound 03/07/23 Finger (Comment which one) Right thumb (Active)   Wound Image   03/07/23 1348   Wound Etiology Other 03/07/23 1348   Dressing Status New dressing applied 03/07/23 1348   Wound Cleansed Cleansed with saline 03/07/23 1348   Dressing/Treatment Non adherent; Hydrofiber Ag;Silicone border 23/10/94 1348   Wound Length (cm) 1.5 cm 03/07/23 1348   Wound Width (cm) 1 cm 03/07/23 1348   Wound Depth (cm) 0.1 cm 03/07/23 1348   Wound Surface Area (cm^2) 1.5 cm^2 03/07/23 1348   Wound Volume (cm^3) 0.15 cm^3 03/07/23 1348   Distance Tunneling (cm) 0 cm 03/07/23 1348   Tunneling Position ___ O'Clock 0 03/07/23 1348   Undermining Starts ___ O'Clock 0 03/07/23 1348   Undermining Ends___ O'Clock 0 03/07/23 1348   Undermining Maxium Distance (cm) 0 03/07/23 1348   Drainage Amount Small 03/07/23 1348   Drainage Description Purulent 03/07/23 1348   Odor None 03/07/23 1348   Janet-wound Assessment Edematous 03/07/23 1348   Margins Defined edges 03/07/23 1348   Wound Thickness Description not for Pressure Injury Partial thickness 03/07/23 1348   Number of days: 0       Wound 03/07/23 Finger (Comment which one) Left thumb (Active)   Wound Image   03/07/23 1348   Wound Etiology Other 03/07/23 1348   Dressing Status New dressing applied 03/07/23 1348   Wound Cleansed Cleansed with saline 03/07/23 1348   Dressing/Treatment Non adherent; Hydrofiber Ag;Silicone border 15/41/52 1348   Wound Length (cm) 1 cm 03/07/23 1348   Wound Width (cm) 0.8 cm 03/07/23 1348   Wound Depth (cm) 0.1 cm 03/07/23 1348   Wound Surface Area (cm^2) 0.8 cm^2 03/07/23 1348   Wound Volume (cm^3) 0.08 cm^3 03/07/23 1348   Distance Tunneling (cm) 0 cm 03/07/23 1348   Tunneling Position ___ O'Clock 0 03/07/23 1348   Undermining Starts ___ O'Clock 0 03/07/23 1348   Undermining Ends___ O'Clock 0 03/07/23 1348   Undermining Maxium Distance (cm) 0 03/07/23 1348   Wound Assessment Pink/red 03/07/23 1348   Drainage Amount Small 03/07/23 1348   Drainage Description Serosanguinous;Purulent 03/07/23 1348   Odor None 03/07/23 1348   Janet-wound Assessment Edematous 03/07/23 1348   Margins Defined edges 03/07/23 1348   Wound Thickness Description not for Pressure Injury Full thickness 03/07/23 1348   Number of days: 0       Response to treatment:  With complaints of pain. Pain Assessment:  Severity:  moderate  Quality of pain: sore  Wound Pain Timing/Severity: wound care  Premedicated: no    Plan:     Plan of Care: Wound 03/07/23 Leg Right medial-Dressing/Treatment: Non adherent, Hydrofiber Ag, ABD, Roll gauze  Wound 03/07/23 Pretibial Left;Proximal Open ulcers x2-Dressing/Treatment: Non adherent, Hydrofiber Ag, ABD, Roll gauze  Wound 03/07/23 Radial Left-Dressing/Treatment: Non adherent, Hydrofiber Ag, Silicone border  Wound 03/07/23 Hand Right;Dorsal-Dressing/Treatment: Open to air  Wound 03/07/23 Finger (Comment which one) Right thumb-Dressing/Treatment: Non adherent, Hydrofiber Ag, Silicone border  Wound 03/07/23 Finger (Comment which one) Left thumb-Dressing/Treatment: Non adherent, Hydrofiber Ag, Silicone border    Patient in bed family members at bedside agreeable to wound care eval. Pt has multiple wounds/abscessed areas to lower legs and rt/lt hands rom IV drug use. Cleansed with NS measured and pictured. Applied Versatel Aquacel Ag. Rt dorsal hand with redness and blister intact. Dr Mauro Shoulders to do debridement today. Pt is generally not at risk for skin breakdown AEB zulema. Specialty Bed Required : no  [] Low Air Loss   [] Pressure Redistribution  [] Fluid Immersion  [] Bariatric  [] Total Pressure Relief  [] Other:     Discharge Plan:  Placement for patient upon discharge: tbd  Hospice Care: no  Patient appropriate for Outpatient [address]: [facility]    Patient/Caregiver Teaching:  Level of patient/caregiver understanding able to: pt voiced understanding. Electronically signed by Ervin Ledezma.  MIK Garcia, on 3/7/2023 at 3:04 PM

## 2023-03-08 PROBLEM — L02.91 ABSCESS: Status: ACTIVE | Noted: 2023-03-08

## 2023-03-08 PROBLEM — F14.90 CRACK COCAINE USE: Status: ACTIVE | Noted: 2023-03-08

## 2023-03-08 PROBLEM — A49.1 INFECTION DUE TO STREPTOCOCCUS PYOGENES: Status: ACTIVE | Noted: 2023-03-08

## 2023-03-08 PROBLEM — F11.90 HEROIN USE: Status: ACTIVE | Noted: 2023-03-08

## 2023-03-08 LAB
LV EF: 58 %
LVEF MODALITY: NORMAL

## 2023-03-08 PROCEDURE — APPSS60 APP SPLIT SHARED TIME 46-60 MINUTES: Performed by: NURSE PRACTITIONER

## 2023-03-08 PROCEDURE — 6370000000 HC RX 637 (ALT 250 FOR IP): Performed by: STUDENT IN AN ORGANIZED HEALTH CARE EDUCATION/TRAINING PROGRAM

## 2023-03-08 PROCEDURE — 6370000000 HC RX 637 (ALT 250 FOR IP): Performed by: NURSE PRACTITIONER

## 2023-03-08 PROCEDURE — 87899 AGENT NOS ASSAY W/OPTIC: CPT

## 2023-03-08 PROCEDURE — 6360000002 HC RX W HCPCS: Performed by: INTERNAL MEDICINE

## 2023-03-08 PROCEDURE — 80048 BASIC METABOLIC PNL TOTAL CA: CPT

## 2023-03-08 PROCEDURE — 6360000002 HC RX W HCPCS: Performed by: NURSE PRACTITIONER

## 2023-03-08 PROCEDURE — 87205 SMEAR GRAM STAIN: CPT

## 2023-03-08 PROCEDURE — 99255 IP/OBS CONSLTJ NEW/EST HI 80: CPT | Performed by: INTERNAL MEDICINE

## 2023-03-08 PROCEDURE — 87081 CULTURE SCREEN ONLY: CPT

## 2023-03-08 PROCEDURE — 2500000003 HC RX 250 WO HCPCS: Performed by: INTERNAL MEDICINE

## 2023-03-08 PROCEDURE — 80202 ASSAY OF VANCOMYCIN: CPT

## 2023-03-08 PROCEDURE — 93306 TTE W/DOPPLER COMPLETE: CPT

## 2023-03-08 PROCEDURE — 1200000000 HC SEMI PRIVATE

## 2023-03-08 PROCEDURE — 85025 COMPLETE CBC W/AUTO DIFF WBC: CPT

## 2023-03-08 PROCEDURE — 2580000003 HC RX 258: Performed by: INTERNAL MEDICINE

## 2023-03-08 PROCEDURE — 87070 CULTURE OTHR SPECIMN AEROBIC: CPT

## 2023-03-08 PROCEDURE — 94761 N-INVAS EAR/PLS OXIMETRY MLT: CPT

## 2023-03-08 PROCEDURE — 2580000003 HC RX 258: Performed by: ANESTHESIOLOGY

## 2023-03-08 RX ORDER — OXYCODONE HYDROCHLORIDE AND ACETAMINOPHEN 5; 325 MG/1; MG/1
1 TABLET ORAL EVERY 4 HOURS PRN
Status: DISCONTINUED | OUTPATIENT
Start: 2023-03-08 | End: 2023-03-13 | Stop reason: HOSPADM

## 2023-03-08 RX ORDER — METRONIDAZOLE 250 MG/1
500 TABLET ORAL EVERY 8 HOURS SCHEDULED
Status: DISCONTINUED | OUTPATIENT
Start: 2023-03-08 | End: 2023-03-08

## 2023-03-08 RX ORDER — CLINDAMYCIN PHOSPHATE 900 MG/50ML
900 INJECTION INTRAVENOUS EVERY 8 HOURS
Status: COMPLETED | OUTPATIENT
Start: 2023-03-09 | End: 2023-03-09

## 2023-03-08 RX ADMIN — OXYCODONE AND ACETAMINOPHEN 1 TABLET: 5; 325 TABLET ORAL at 14:40

## 2023-03-08 RX ADMIN — SODIUM CHLORIDE, PRESERVATIVE FREE 10 ML: 5 INJECTION INTRAVENOUS at 21:41

## 2023-03-08 RX ADMIN — VANCOMYCIN HYDROCHLORIDE 1000 MG: 1 INJECTION, POWDER, LYOPHILIZED, FOR SOLUTION INTRAVENOUS at 11:15

## 2023-03-08 RX ADMIN — SODIUM CHLORIDE, PRESERVATIVE FREE 10 ML: 5 INJECTION INTRAVENOUS at 08:27

## 2023-03-08 RX ADMIN — SODIUM CHLORIDE, POTASSIUM CHLORIDE, SODIUM LACTATE AND CALCIUM CHLORIDE 1000 ML: 600; 310; 30; 20 INJECTION, SOLUTION INTRAVENOUS at 17:26

## 2023-03-08 RX ADMIN — OXYCODONE AND ACETAMINOPHEN 1 TABLET: 5; 325 TABLET ORAL at 21:39

## 2023-03-08 RX ADMIN — METRONIDAZOLE 500 MG: 250 TABLET ORAL at 14:05

## 2023-03-08 RX ADMIN — KETOROLAC TROMETHAMINE 15 MG: 30 INJECTION, SOLUTION INTRAMUSCULAR; INTRAVENOUS at 17:32

## 2023-03-08 RX ADMIN — KETOROLAC TROMETHAMINE 15 MG: 30 INJECTION, SOLUTION INTRAMUSCULAR; INTRAVENOUS at 11:09

## 2023-03-08 RX ADMIN — OXYCODONE AND ACETAMINOPHEN 1 TABLET: 5; 325 TABLET ORAL at 09:01

## 2023-03-08 RX ADMIN — KETOROLAC TROMETHAMINE 15 MG: 30 INJECTION, SOLUTION INTRAMUSCULAR; INTRAVENOUS at 03:26

## 2023-03-08 RX ADMIN — ENOXAPARIN SODIUM 40 MG: 100 INJECTION SUBCUTANEOUS at 08:27

## 2023-03-08 RX ADMIN — CEFTRIAXONE SODIUM 1000 MG: 1 INJECTION, POWDER, FOR SOLUTION INTRAMUSCULAR; INTRAVENOUS at 05:20

## 2023-03-08 RX ADMIN — Medication 900 MG: at 17:31

## 2023-03-08 ASSESSMENT — PAIN - FUNCTIONAL ASSESSMENT
PAIN_FUNCTIONAL_ASSESSMENT: PREVENTS OR INTERFERES SOME ACTIVE ACTIVITIES AND ADLS
PAIN_FUNCTIONAL_ASSESSMENT: PREVENTS OR INTERFERES SOME ACTIVE ACTIVITIES AND ADLS
PAIN_FUNCTIONAL_ASSESSMENT: ACTIVITIES ARE NOT PREVENTED

## 2023-03-08 ASSESSMENT — PAIN SCALES - GENERAL
PAINLEVEL_OUTOF10: 6
PAINLEVEL_OUTOF10: 8
PAINLEVEL_OUTOF10: 3
PAINLEVEL_OUTOF10: 8
PAINLEVEL_OUTOF10: 8
PAINLEVEL_OUTOF10: 7
PAINLEVEL_OUTOF10: 9
PAINLEVEL_OUTOF10: 7
PAINLEVEL_OUTOF10: 8
PAINLEVEL_OUTOF10: 9
PAINLEVEL_OUTOF10: 8
PAINLEVEL_OUTOF10: 6
PAINLEVEL_OUTOF10: 7
PAINLEVEL_OUTOF10: 6

## 2023-03-08 ASSESSMENT — PAIN DESCRIPTION - DESCRIPTORS
DESCRIPTORS: ACHING
DESCRIPTORS: DULL;SHARP
DESCRIPTORS: SHARP;DULL
DESCRIPTORS: SHARP;DULL
DESCRIPTORS: SHOOTING
DESCRIPTORS: ACHING
DESCRIPTORS: SHARP
DESCRIPTORS: SHARP

## 2023-03-08 ASSESSMENT — PAIN DESCRIPTION - LOCATION
LOCATION: ARM;LEG
LOCATION: HAND;ARM
LOCATION: ARM;LEG
LOCATION: ARM;LEG

## 2023-03-08 ASSESSMENT — PAIN SCALES - WONG BAKER
WONGBAKER_NUMERICALRESPONSE: 6
WONGBAKER_NUMERICALRESPONSE: 8

## 2023-03-08 ASSESSMENT — PAIN DESCRIPTION - ORIENTATION
ORIENTATION: RIGHT;LEFT
ORIENTATION: RIGHT;LEFT;LOWER
ORIENTATION: RIGHT;LEFT
ORIENTATION: RIGHT;LEFT;LOWER;UPPER
ORIENTATION: RIGHT;LEFT
ORIENTATION: RIGHT;LEFT

## 2023-03-08 ASSESSMENT — PAIN DESCRIPTION - PAIN TYPE
TYPE: SURGICAL PAIN
TYPE: SURGICAL PAIN

## 2023-03-08 ASSESSMENT — ENCOUNTER SYMPTOMS
DIARRHEA: 0
CONSTIPATION: 0
COUGH: 0
SHORTNESS OF BREATH: 0
SORE THROAT: 0
WHEEZING: 0
ABDOMINAL DISTENTION: 0
EYE DISCHARGE: 0
BACK PAIN: 0

## 2023-03-08 ASSESSMENT — PAIN DESCRIPTION - FREQUENCY
FREQUENCY: CONTINUOUS
FREQUENCY: CONTINUOUS

## 2023-03-08 ASSESSMENT — PAIN DESCRIPTION - ONSET: ONSET: ON-GOING

## 2023-03-08 NOTE — PROGRESS NOTES
V2.0  [facility] Hospitalist Progress Note      Name:  Suze Crooks /Age/Sex: 1980  (44 y.o. female)   MRN & CSN:  [id number] & [id number] Encounter Date/Time: 3/8/2023 9:37 AM EST    Location:  [address] [place] [address] PCP: No primary care provider on file. Hospital Day: 2    Assessment and Plan:   Suze Crooks is a 44 y.o. female with pmh of polysubstance use who presents with Cellulitis      Plan:  #Purulent cellulitis of BLE and BUE  -has multiple areas of induration on upper and Les  -Abscesses primarily at the site of IV drug injection  -no fevers  -started ceftriaxone and vancomycin (given patient's h/o IV drug use, will be a high risk for s aureus)  -general surgery consulted  -s/p I&D of upper and lower extremity abscesses 3/7  -Labs and cultures ordered yesterday but pending  -Consult ID for further recs, awaiting recs       #Possible R-sided pneumonia  -patient with coughing with sputum production  -dx with pneumonia at [facility] ED  -CXR  -already on antibiotics, can add on azithromycin for atypical coverage if needed     #Polysubstance use disorder  -uses tobacco, crack cocaine, IV heroin, marijuana  --UDS not ordered on admission, ordered today  -Last reported drug use 1 week ago  - on stopping    Diet ADULT DIET; Regular   DVT Prophylaxis [x] Lovenox, []  Heparin, [] SCDs, [] Ambulation,  [] Eliquis, [] Xarelto  [] Coumadin   Code Status Full Code   Disposition From: Home  Expected Disposition: Home  Estimated Date of Discharge: 2-3 days  Patient requires continued admission due to PNA   Surrogate Decision Maker/ POA      Subjective:     Chief Complaint: No chief complaint on file. Seen and evaluated at bedside. Patient appears more awake. C/o pain at the I&D sites. Tolerating diet. Review of Systems:    Review of Systems   Constitutional:  Positive for activity change, appetite change and fatigue. Negative for diaphoresis. HENT:  Negative for congestion and sore throat. Eyes:  Negative for discharge and visual disturbance. Respiratory:  Negative for cough, shortness of breath and wheezing. Cardiovascular:  Negative for chest pain, palpitations and leg swelling. Gastrointestinal:  Negative for abdominal distention, constipation and diarrhea. Genitourinary:  Negative for difficulty urinating and hematuria. Musculoskeletal:  Positive for arthralgias. Negative for back pain and gait problem. Skin:  Positive for rash and wound. Neurological:  Negative for dizziness, syncope and speech difficulty. Hematological:  Negative for adenopathy. Psychiatric/Behavioral:  Negative for agitation and confusion. All other systems reviewed and are negative. Objective: Intake/Output Summary (Last 24 hours) at 3/8/2023 1005  Last data filed at 3/8/2023 0827  Gross per 24 hour   Intake 760 ml   Output 10 ml   Net 750 ml          Vitals:   Vitals:    03/08/23 0823   BP: 112/75   Pulse: 59   Resp: 15   Temp: 97.8 °F (36.6 °C)   SpO2: 98%       Physical Exam:   Physical Exam  Vitals and nursing note reviewed. Constitutional:       General: She is not in acute distress. Appearance: She is ill-appearing. HENT:      Head: Normocephalic and atraumatic. Nose: Nose normal.      Mouth/Throat:      Mouth: Mucous membranes are moist.   Eyes:      Extraocular Movements: Extraocular movements intact. Pupils: Pupils are equal, round, and reactive to light. Cardiovascular:      Rate and Rhythm: Normal rate and regular rhythm. Pulses: Normal pulses. Heart sounds: Normal heart sounds. Pulmonary:      Effort: Pulmonary effort is normal.      Breath sounds: Normal breath sounds. Abdominal:      Palpations: Abdomen is soft. Musculoskeletal:         General: Normal range of motion. Cervical back: Normal range of motion. Comments: Right hand erythematous wound, approximately 4 x 4 cm on dorsal aspect of right hand.   Bilateral lower extremity cellulitis around anterior medial knee. Skin:     General: Skin is warm. Capillary Refill: Capillary refill takes less than 2 seconds. Neurological:      General: No focal deficit present. Mental Status: She is alert and oriented to person, place, and time. Psychiatric:         Mood and Affect: Mood normal.         Behavior: Behavior normal.          Medications:   Medications:    sodium chloride flush  5-40 mL IntraVENous 2 times per day    enoxaparin  40 mg SubCUTAneous Daily    cefTRIAXone (ROCEPHIN) IV  1,000 mg IntraVENous Q24H    vancomycin  1,000 mg IntraVENous Q12H    nicotine  1 patch TransDERmal Daily      Infusions:    sodium chloride 50 mL (03/07/23 0524)    lactated ringers IV soln 1,000 mL (03/07/23 2028)     PRN Meds: oxyCODONE-acetaminophen, 1 tablet, Q4H PRN  sodium chloride flush, 5-40 mL, PRN  sodium chloride, , PRN  ondansetron, 4 mg, Q8H PRN   Or  ondansetron, 4 mg, Q6H PRN  polyethylene glycol, 17 g, Daily PRN  acetaminophen, 650 mg, Q6H PRN   Or  acetaminophen, 650 mg, Q6H PRN  ketorolac, 15 mg, Q6H PRN  oxyCODONE, 5 mg, Once PRN  droperidol, 0.625 mg, Q10 Min PRN  diphenhydrAMINE, 12.5 mg, Once PRN  ipratropium-albuterol, 1 ampule, Once PRN      Labs      Recent Results (from the past 24 hour(s))   Drug screen multi urine    Collection Time: 03/07/23  2:42 PM   Result Value Ref Range    Cannabinoid Scrn, Ur NEGATIVE NEGATIVE    Amphetamines NEGATIVE NEGATIVE    Cocaine Metabolite UNCONFIRMED POSITIVE (A) NEGATIVE    Benzodiazepine Screen, Urine NEGATIVE NEGATIVE    Barbiturate Screen, Ur NEGATIVE NEGATIVE    Opiates, Urine NEGATIVE NEGATIVE    Phencyclidine, Urine NEGATIVE NEGATIVE    Oxycodone NEGATIVE NEGATIVE   Pregnancy, urine    Collection Time: 03/07/23  2:42 PM   Result Value Ref Range    Pregnancy, Urine NEGATIVE NEGATIVE    Interpretation       HCG Method Limitations: Very dilute specimens may have insufficient concentration of HCG to bring about a positive result. POC Pregnancy Urine Qual    Collection Time: 03/07/23  4:16 PM   Result Value Ref Range    Preg Test, Ur NEGATIVE NEGATIVE   Culture, Surgical    Collection Time: 03/07/23  7:01 PM    Specimen: Body Fluid   Result Value Ref Range    Specimen LEG LT     Special Requests LEFT LEG CULTURE  AEROBIC AND ANAEROBIC           Imaging/Diagnostics Last 24 Hours   XR CHEST (2 VW)    Result Date: 3/7/2023  EXAMINATION: TWO XRAY VIEWS OF THE CHEST 3/7/2023 8:05 am COMPARISON: None. HISTORY: ORDERING SYSTEM PROVIDED HISTORY: abnormal breath sounds on right lung TECHNOLOGIST PROVIDED HISTORY: Reason for exam:->abnormal breath sounds on right lung Reason for Exam: abnormal breath sounds on right lung FINDINGS: Cardiac silhouette appears unremarkable. Consolidation at the right lung base which appears to involve the right middle and lower lobes suggesting pneumonia. No pleural effusion. No pneumothorax. Left lung is clear. Postoperative changes of cholecystectomy. Osseous structures appear intact. 1. Consolidation at the right lung base which appears to involve both the right middle and lower lobes. Findings most suggestive of pneumonia.        Electronically signed by Minoo Mai MD on 3/8/2023 at 10:05 AM

## 2023-03-08 NOTE — PROGRESS NOTES
1921- pt received from OR. Monitors placed and alarms on. Report received from [facility]. 1934- pt's  at bedside. 1935- pt medicated for complaints of pain. 1942- pt medicated for complaints of pain. 1949- pt medicated for complaints of pain. 1955- report called to Adriel Nunn RN prior to transport to inpatient room. 1958- pt transported to inpatient room.

## 2023-03-08 NOTE — CONSULTS
Infectious Disease Consult Note  3/8/2023   Patient Name: Devi Negro : 1980   Impression  Sepsis Secondary to:   Multiple Abscess on Extremities Secondary to IVDU:  Pneumonia of the Right Lung with Resolved Hemoptysis:  Mid to Low Back Pain, Concern for OM/ Discitis/ Abscess:  Past Endocarditis:  Need to R/o TB:  Afebrile  CrCl 99  No reported allergies to ABX  Pct 0.513  3/7-BC Pending  3/8-BC Pending  3/7-CXR 2 View: Consolidation at the right lung base which appears to involve both the right middle and lower lobes. Findings most suggestive of pneumonia. 3/7-S/p Dr. Leatha Light: Upper and lower extremities I&D. DX: multiple abscesses of all extremities. Surgical cultures: Pending  3/8-Echo complete: Pending  IVDU of Heroin/ Crack Cocaine:   Reports has used x 18 years (since age 26)  Marijuana Use/ Tobacco Abuse:  Homelessness:  Obesity:  BMI 32.29  Multi-morbidity: per PMHx    Plan:  Continue IV ceftriaxone 1 gm q24h  Continue IV vancomycin per pharmacy dosing  Started po Flagyl 500 mg tid  Trend CRP and Pct, ordered  Await BC  Await surgical cultures  Ordered MRSA screen and resp culture, Strep pna and legionella ag  Ordered MRI C/T/L spines w wo contrast to eval for OM/ discitis/ abscess  Ordered Complete echo to eval for IE  Ordered airborne isolation while evaluating for TB as pt had hemoptysis  Ordered Quantiferon TB, AFB x 3 days    Thank you for allowing me to consult in the care of this patient.  ------------------------  REASON FOR CONSULT: Infective syndrome \"h/o IVC drug use, now with cellulitis at injection sites\"  Requested by: Dr. Patricia Munoz is a 44 y.o.  female with a history of IVDU (IV heroin and crack cocaine), past endocarditis and right shoulder OM, marijuana use, and tobacco abuse who was admitted 3/7/2023 for further evaluation and management of multiple abscesses on BLE and BUE, noticed all abscesses starting about 3 weeks prior to admission.   The patient reports the sites of the abscesses are likely from her IVDU as she uses these sites for injection. She also has a productive cough and a sore throat. She was seen at [facility] emergency dept and was diagnosed with pneumonia. She was started on empiric IV ceftriaxone and vancomcyin. She was then transferred to [facility] for admission. She reports she started having hemoptysis with large, red bumps on her throat and tongue about 3 weeks prior to admission, which resolved after a week. She states she uses many things to dilute the IV heroin, including outside hose water, [employer], Pepsi and Sprite. The patient reports she goes to a needle exchange program, and never re-uses her needles. She was checked 1/5/2023 for HIV and syphilis at the needle exchange center and was reported as negative. She says she spends about $ 200-300 per day for heroin and crack cocaine. She also complains of mid to low back pain to light palpation. States is unsure the duration of back pain. ? Infectious diseases service was consulted to evaluate the pt, and recommend further investigative and therapeutic measures. ROS: Other systems reviewed Including eyes, ENT, respiratory, cardiovascular, GI, , dermatologic, neurologic, psych, hem/lymphatic, musculoskeletal and endocrine were negative other than what is mentioned above. Patient Active Problem List    Diagnosis Date Noted    Cellulitis 03/07/2023     Past Medical History:   Diagnosis Date    History of smoking at least 2 packs per day for at least 15 years       Past Surgical History:   Procedure Laterality Date    LEG SURGERY Bilateral 3/7/2023    UPPER AND LOWER EXTREMEITIES DEBRIDEMENT INCISION AND DRAINAGE performed by Michelle Garrett DO at [address] [place] [address],[address] reviewed. No pertinent family history. Infectious disease related family history - not contibutory.    SOCIAL HISTORY  Social History     Tobacco Use    Smoking status: Not on file    Smokeless tobacco: Not on file   Substance Use Topics    Alcohol use: Not on file      Born:[place], [place]   Lives:[place], homeless  Occupation: Disabled  No recent travel of significance. No recent unusual exposures. NO pets    ? ALLERGIES  Allergies   Allergen Reactions    Other Hives     Darvocet     Propoxyphene Hives      MEDICATIONS  Reviewed and are per the chart/EMR. ? Antibiotics:   Present:  Vancomycin 3/7-  Ceftriaxone 3/7-  Flagyl 3/8-  Past:  ?  -------------------------------------------------------------------------------------------------------------------    Vital Signs:  Vitals:    03/08/23 0823   BP: 112/75   Pulse: 59   Resp: 15   Temp: 97.8 °F (36.6 °C)   SpO2: 98%         Exam:    VS: noted; wt 170 lb (77.5 kg) Height 5'1\"  Gen: alert and oriented X3, no distress  Skin: no stigmata of endocarditis  Wounds: C/D/I multiple dressings intact on bilateral hands, arms, and lower legs  HEMT: AT/NC Oropharynx pink, moist, and without lesions or exudates; dentition in poor state of repair  Eyes: PERRLA, EOMI, conjunctiva pink, sclera anicteric. Neck: Supple. Trachea midline. No LAD. Chest: no distress and CTA. Posterior breath sounds with scattered rhonchi to diminished bases. Room air. Occasional productive cough. Tenderness to light palpation mid to low back. Heart: NSR and no MRG. Abd: soft, non-distended, no tenderness, no hepatomegaly. Normoactive bowel sounds. Ext: no clubbing, cyanosis, or edema  Neuro: Mental status intact. CN 2-12 intact and no focal sensory or motor deficits    ? Diagnostic Studies: reviewed  3/7/2023 XR Chest (2 VW):  Impression   1. Consolidation at the right lung base which appears to involve both the   right middle and lower lobes. Findings most suggestive of pneumonia. ??  I have examined this patient and available medical records on this date and have made the above observations, conclusions and recommendations.   Electronically signed by: Electronically signed by Sarath Chen.  REMY Perry - CNP on 3/8/2023 at 10:15 AM

## 2023-03-08 NOTE — ANESTHESIA POSTPROCEDURE EVALUATION
Department of Anesthesiology  Postprocedure Note    Patient: Clayton Langford  MRN: [id number]  YOB: 1980  Date of evaluation: 3/7/2023      Procedure Summary     Date: 03/07/23 Room / Location: [address] [place] [address]    Anesthesia Start: 1758 Anesthesia Stop: 1925    Procedure: UPPER AND LOWER EXTREMEITIES DEBRIDEMENT INCISION AND DRAINAGE (Bilateral) Diagnosis:       Abscess      (Abscess [L02.91])    Surgeons: Bhavani Laguna DO Responsible Provider: Luci Ferris MD    Anesthesia Type: general ASA Status: 4          Anesthesia Type: No value filed.     Zach Phase I: Zach Score: 9    Zach Phase II:        Anesthesia Post Evaluation    Patient location during evaluation: PACU  Patient participation: complete - patient participated  Level of consciousness: awake and alert  Pain score: 8  Airway patency: patent  Nausea & Vomiting: no vomiting and no nausea  Complications: no  Cardiovascular status: blood pressure returned to baseline and hemodynamically stable  Respiratory status: acceptable, spontaneous ventilation, nonlabored ventilation and nasal cannula  Hydration status: stable

## 2023-03-08 NOTE — OP NOTE
Operative Note      Patient: Cammie Mukherjee  YOB: 1980  MRN: [id number]    Date of Procedure: 3/7/2023    Pre-Op Diagnosis: Multiple abscesses of all extremities. Post-Op Diagnosis: Same       Procedure(s):  UPPER AND LOWER EXTREMEITIES DEBRIDEMENT INCISION AND DRAINAGE    Surgeon(s):  Jojo Vallecillo DO    Assistant:   First Assistant: Subha Lott    Anesthesia: General    Estimated Blood Loss (mL): Minimal    Complications: None    Specimens:   ID Type Source Tests Collected by Time Destination   1 : LEFT LEG CULTURE Tissue Tissue CULTURE, SURGICAL Jojo Vallecillo DO 3/7/2023 1856    2 : LEFT LEG CULTURE Body Fluid Fluid CULTURE, SURGICAL Jojo Vallecillo,  3/7/2023 1857    3 : RIGHT HAND CULTURE Body Fluid Fluid CULTURE, SURGICAL Jojo Vallecillo,  3/7/2023 1900        Implants:  * No implants in log *      Drains: * No LDAs found *    Findings: Same    Detailed Description of Procedure:     Patient taken the OR and laid in supine position. After induction of general anesthesia, patient was prepped and draped in usual sterile fashion. There were multiple subcutaneous abscesses over both lower and upper extremities. These all had ulcerated areas of skin centrally. There was 1 located on left lower extremity. There were 2-3 on right lower extremity. There was 1 on left forearm and 1 on right forearm and 2 on right hand 1 on dorsal aspect and 1 over dorsal aspect of thumb. All incisions on upper and lower extremities were incised with elliptical incision incorporating area of ulcerated skin. All abscesses went down to subcutaneous tissue. Necrotic tissue was debrided with electrocautery. Hemostasis achieved electrocautery. Wounds were irrigated and packed with iodoform ribbon. Assess on dorsal aspect of hand was incised with electrocautery and abscess cavity was irrigated and packed with iodoform ribbon.   Wound on thumb was superficial and was debrided with a curette and Xeroform dressing placed over this. All wounds were dressed with 4 x 4 gauze and Kerlix dressings. Patient tolerated the procedure well, without complication, and the end of case was transported to recovery area in stable condition.     Electronically signed by Nichole Luna DO on 3/7/2023 at 9:13 PM

## 2023-03-08 NOTE — CONSULTS
[facility]  consulted by Dr. Aly Meaz for monitoring and adjustment. Indication for treatment: Vancomycin indication: SSTI with risk factors   Goal trough: Trough Goal: 10-15 mcg/mL  AUC/ANGLE: <500    Risk Factors for MRSA Identified:   Current or recent history of IVDA, Purulent and/or complicated SSTI    Pertinent Laboratory Values:   Temp Readings from Last 3 Encounters:   03/08/23 97.8 °F (36.6 °C) (Oral)     No results for input(s): WBC, LACTATE in the last 72 hours. Recent Labs     03/07/23  0635   BUN 9   CREATININE 0.7       Estimated Creatinine Clearance: 99 mL/min (based on SCr of 0.7 mg/dL). Intake/Output Summary (Last 24 hours) at 3/8/2023 1506  Last data filed at 3/8/2023 0827  Gross per 24 hour   Intake 760 ml   Output 10 ml   Net 750 ml       Pertinent Cultures:   Date    Source    Results  3/7      Blood    Ordered    Vancomycin level:   TROUGH:  No results for input(s): VANCOTROUGH in the last 72 hours. RANDOM:  No results for input(s): VANCORANDOM in the last 72 hours. Assessment:  HPI: Stanislav Holden is a 44 y.o. female with a pmh of polysubstance use who presents with Cellulitis. General surgery consult for possible I&D. Also noted to have possible pneumonia. SCr, BUN, and urine output: SCR appears at baseline @0.7, no UOP data  Day(s) of therapy: 2 of 7  Vancomycin concentration:   3/8 - missed level today  3/9 - random level @ 0060    Plan:  Continue on Vancomycin 1000mg ivpb q12h for a predicted trough of 12.6 at steady state  Random level was missed today; re-order for tomorrow AM  Will need to monitor for accumulation 2/2 BMI  Follow culture results when available. Pharmacy will continue to monitor patient and adjust therapy as indicated    VANCOMYCIN CONCENTRATION SCHEDULED FOR 3/9 @06:00    Thank you for the consult.   Sonia Willard, [facility]  3/8/2023 3:06 PM

## 2023-03-09 ENCOUNTER — APPOINTMENT (OUTPATIENT)
Dept: MRI IMAGING | Age: 43
DRG: 364 | End: 2023-03-09
Attending: INTERNAL MEDICINE
Payer: MEDICAID

## 2023-03-09 PROBLEM — L02.419 ABSCESS OF LOWER EXTREMITY: Status: ACTIVE | Noted: 2023-03-09

## 2023-03-09 PROBLEM — L02.419 ABSCESS OF UPPER EXTREMITY: Status: ACTIVE | Noted: 2023-03-09

## 2023-03-09 LAB
L PNEUMO AG UR QL IA: NEGATIVE
S PNEUM AG CSF QL: NORMAL

## 2023-03-09 PROCEDURE — 6370000000 HC RX 637 (ALT 250 FOR IP): Performed by: STUDENT IN AN ORGANIZED HEALTH CARE EDUCATION/TRAINING PROGRAM

## 2023-03-09 PROCEDURE — 1200000000 HC SEMI PRIVATE

## 2023-03-09 PROCEDURE — 99232 SBSQ HOSP IP/OBS MODERATE 35: CPT | Performed by: NURSE PRACTITIONER

## 2023-03-09 PROCEDURE — 6360000002 HC RX W HCPCS: Performed by: NURSE PRACTITIONER

## 2023-03-09 PROCEDURE — 6370000000 HC RX 637 (ALT 250 FOR IP): Performed by: NURSE PRACTITIONER

## 2023-03-09 PROCEDURE — 6360000002 HC RX W HCPCS: Performed by: INTERNAL MEDICINE

## 2023-03-09 PROCEDURE — 2500000003 HC RX 250 WO HCPCS: Performed by: INTERNAL MEDICINE

## 2023-03-09 PROCEDURE — 87015 SPECIMEN INFECT AGNT CONCNTJ: CPT

## 2023-03-09 PROCEDURE — 86592 SYPHILIS TEST NON-TREP QUAL: CPT

## 2023-03-09 PROCEDURE — 87116 MYCOBACTERIA CULTURE: CPT

## 2023-03-09 PROCEDURE — 6360000002 HC RX W HCPCS: Performed by: HOSPITALIST

## 2023-03-09 PROCEDURE — 2500000003 HC RX 250 WO HCPCS: Performed by: SURGERY

## 2023-03-09 PROCEDURE — 94761 N-INVAS EAR/PLS OXIMETRY MLT: CPT

## 2023-03-09 PROCEDURE — 99213 OFFICE O/P EST LOW 20 MIN: CPT

## 2023-03-09 PROCEDURE — 2580000003 HC RX 258: Performed by: INTERNAL MEDICINE

## 2023-03-09 PROCEDURE — 2580000003 HC RX 258: Performed by: ANESTHESIOLOGY

## 2023-03-09 PROCEDURE — 99024 POSTOP FOLLOW-UP VISIT: CPT | Performed by: SURGERY

## 2023-03-09 RX ORDER — PROMETHAZINE HYDROCHLORIDE 25 MG/ML
6.25 INJECTION, SOLUTION INTRAMUSCULAR; INTRAVENOUS EVERY 6 HOURS PRN
Status: DISCONTINUED | OUTPATIENT
Start: 2023-03-09 | End: 2023-03-13 | Stop reason: HOSPADM

## 2023-03-09 RX ORDER — LORAZEPAM 2 MG/ML
1 INJECTION INTRAMUSCULAR
Status: ACTIVE | OUTPATIENT
Start: 2023-03-09 | End: 2023-03-10

## 2023-03-09 RX ORDER — LINEZOLID 600 MG/1
600 TABLET, FILM COATED ORAL EVERY 12 HOURS SCHEDULED
Status: DISCONTINUED | OUTPATIENT
Start: 2023-03-09 | End: 2023-03-13

## 2023-03-09 RX ADMIN — ONDANSETRON 4 MG: 2 INJECTION INTRAMUSCULAR; INTRAVENOUS at 05:28

## 2023-03-09 RX ADMIN — KETOROLAC TROMETHAMINE 15 MG: 30 INJECTION, SOLUTION INTRAMUSCULAR; INTRAVENOUS at 00:33

## 2023-03-09 RX ADMIN — CEFTRIAXONE SODIUM 1000 MG: 1 INJECTION, POWDER, FOR SOLUTION INTRAMUSCULAR; INTRAVENOUS at 04:18

## 2023-03-09 RX ADMIN — SODIUM CHLORIDE, PRESERVATIVE FREE 10 ML: 5 INJECTION INTRAVENOUS at 10:06

## 2023-03-09 RX ADMIN — ONDANSETRON 4 MG: 2 INJECTION INTRAMUSCULAR; INTRAVENOUS at 13:08

## 2023-03-09 RX ADMIN — OXYCODONE AND ACETAMINOPHEN 1 TABLET: 5; 325 TABLET ORAL at 17:48

## 2023-03-09 RX ADMIN — KETOROLAC TROMETHAMINE 15 MG: 30 INJECTION, SOLUTION INTRAMUSCULAR; INTRAVENOUS at 20:56

## 2023-03-09 RX ADMIN — PROMETHAZINE HYDROCHLORIDE 6.25 MG: 25 INJECTION INTRAMUSCULAR; INTRAVENOUS at 09:49

## 2023-03-09 RX ADMIN — KETOROLAC TROMETHAMINE 15 MG: 30 INJECTION, SOLUTION INTRAMUSCULAR; INTRAVENOUS at 14:08

## 2023-03-09 RX ADMIN — OXYCODONE AND ACETAMINOPHEN 1 TABLET: 5; 325 TABLET ORAL at 10:05

## 2023-03-09 RX ADMIN — VANCOMYCIN HYDROCHLORIDE 1000 MG: 1 INJECTION, POWDER, LYOPHILIZED, FOR SOLUTION INTRAVENOUS at 00:26

## 2023-03-09 RX ADMIN — CLINDAMYCIN PHOSPHATE 900 MG: 900 INJECTION, SOLUTION INTRAVENOUS at 09:57

## 2023-03-09 RX ADMIN — CLINDAMYCIN PHOSPHATE 900 MG: 900 INJECTION, SOLUTION INTRAVENOUS at 02:18

## 2023-03-09 RX ADMIN — ENOXAPARIN SODIUM 40 MG: 100 INJECTION SUBCUTANEOUS at 09:49

## 2023-03-09 RX ADMIN — LINEZOLID 600 MG: 600 TABLET, FILM COATED ORAL at 20:57

## 2023-03-09 RX ADMIN — KETOROLAC TROMETHAMINE 15 MG: 30 INJECTION, SOLUTION INTRAMUSCULAR; INTRAVENOUS at 07:42

## 2023-03-09 RX ADMIN — SILVER SULFADIAZINE: 10 CREAM TOPICAL at 13:20

## 2023-03-09 RX ADMIN — SODIUM CHLORIDE, POTASSIUM CHLORIDE, SODIUM LACTATE AND CALCIUM CHLORIDE 1000 ML: 600; 310; 30; 20 INJECTION, SOLUTION INTRAVENOUS at 17:52

## 2023-03-09 RX ADMIN — VANCOMYCIN HYDROCHLORIDE 1000 MG: 1 INJECTION, POWDER, LYOPHILIZED, FOR SOLUTION INTRAVENOUS at 11:18

## 2023-03-09 RX ADMIN — OXYCODONE AND ACETAMINOPHEN 1 TABLET: 5; 325 TABLET ORAL at 04:19

## 2023-03-09 ASSESSMENT — PAIN - FUNCTIONAL ASSESSMENT
PAIN_FUNCTIONAL_ASSESSMENT: PREVENTS OR INTERFERES SOME ACTIVE ACTIVITIES AND ADLS
PAIN_FUNCTIONAL_ASSESSMENT: PREVENTS OR INTERFERES SOME ACTIVE ACTIVITIES AND ADLS
PAIN_FUNCTIONAL_ASSESSMENT: ACTIVITIES ARE NOT PREVENTED
PAIN_FUNCTIONAL_ASSESSMENT: ACTIVITIES ARE NOT PREVENTED
PAIN_FUNCTIONAL_ASSESSMENT: PREVENTS OR INTERFERES SOME ACTIVE ACTIVITIES AND ADLS

## 2023-03-09 ASSESSMENT — PAIN SCALES - GENERAL
PAINLEVEL_OUTOF10: 10
PAINLEVEL_OUTOF10: 7
PAINLEVEL_OUTOF10: 10
PAINLEVEL_OUTOF10: 9
PAINLEVEL_OUTOF10: 10
PAINLEVEL_OUTOF10: 5

## 2023-03-09 ASSESSMENT — PAIN SCALES - WONG BAKER
WONGBAKER_NUMERICALRESPONSE: 2

## 2023-03-09 ASSESSMENT — PAIN DESCRIPTION - PAIN TYPE: TYPE: SURGICAL PAIN

## 2023-03-09 ASSESSMENT — PAIN DESCRIPTION - DESCRIPTORS
DESCRIPTORS: ACHING

## 2023-03-09 ASSESSMENT — PAIN DESCRIPTION - LOCATION
LOCATION: GENERALIZED
LOCATION: ARM;LEG
LOCATION: GENERALIZED
LOCATION: ARM;LEG
LOCATION: ARM;LEG

## 2023-03-09 ASSESSMENT — PAIN DESCRIPTION - ORIENTATION
ORIENTATION: RIGHT;LEFT
ORIENTATION: RIGHT;LEFT;LOWER;UPPER
ORIENTATION: LEFT;RIGHT
ORIENTATION: RIGHT;LEFT
ORIENTATION: RIGHT;LEFT
ORIENTATION: RIGHT;LEFT;LOWER;UPPER

## 2023-03-09 ASSESSMENT — PAIN DESCRIPTION - ONSET: ONSET: ON-GOING

## 2023-03-09 ASSESSMENT — PAIN DESCRIPTION - FREQUENCY: FREQUENCY: CONTINUOUS

## 2023-03-09 NOTE — PLAN OF CARE
Problem: Discharge Planning  Goal: Discharge to home or other facility with appropriate resources  Outcome: Progressing     Problem: Pain  Goal: Verbalizes/displays adequate comfort level or baseline comfort level  Outcome: Progressing     Problem: Safety - Adult  Goal: Free from fall injury  Outcome: Progressing     Problem: Skin/Tissue Integrity  Goal: Absence of new skin breakdown  Description: 1. Monitor for areas of redness and/or skin breakdown  2. Assess vascular access sites hourly  3. Every 4-6 hours minimum:  Change oxygen saturation probe site  4. Every 4-6 hours:  If on nasal continuous positive airway pressure, respiratory therapy assess nares and determine need for appliance change or resting period.   Outcome: Progressing     Problem: Nutrition Deficit:  Goal: Optimize nutritional status  Outcome: Progressing Reason for visit: Annual    Last Pap: 12/10/14 Neg  Last Mammo: 18 Benign   Last Tdap: 18  Dexa: None  Colon: None  Gardasil: None  Pharmacy: Verified  Pt received the flu vaccine  Obstetric History       T2      L2     SAB0   TAB0   Ectopic0   Molar0   Multiple0   Live Births2      Cholesterol 3/25/17  CHOLESTEROL (mg/dL)   Date Value   2017 161     HDL (mg/dL)   Date Value   2017 48 (L)     CHOL/HDL (no units)   Date Value   2017 3.4     TRIGLYCERIDE (mg/dL)   Date Value   2017 209 (H)     CALCULATED LDL (mg/dL)   Date Value   2017 71     C/O: Pt states for about 3-4 weeks she had two incidents of sharp LLQ pain. The first incident lasted for two days on and off and the second incident did not last as long but she did experience lower back pain and a crampy feeling. Denies any further incidents. Pt would like refills on her OCP's today.     Denies known Latex allergy or symptoms of Latex sensitivity.  Medications reviewed and updated.  Tobacco use verified  Pharmacy verified

## 2023-03-09 NOTE — PROGRESS NOTES
V2.0  [facility] [place] [facility]      Name:  Marva Chapman /Age/Sex: 1980  (44 y.o. female)   MRN & CSN:  [id number] & [id number] Encounter Date/Time: 3/9/2023 9:37 AM EST    Location:  [address] [place] [address] PCP: No primary care provider on file. Hospital Day: 3    Assessment and Plan:   Marva Chapman is a 44 y.o. female with pmh of polysubstance use who presents with Cellulitis      Plan:  #Abscesses and purulent cellulitis of BLE and BUE  -has multiple areas of induration on UEs and LEs  -Abscesses primarily at the site of IV drug injection  -no fevers  -general surgery consulted  -s/p I&D of upper and lower extremity abscesses 3/7  - Off vancomycin since cannot monitor Vanco levels, start p.o. linezolid. Continue Rocephin. Surgical culture 3/7: Strep pyogenes. Echo: EF 55-60%, no mention of vegetations. MRI cannot be done due to panic attack. Reattempt tomorrow with Ativan. Wound care following. #Possible R-sided pneumonia  -patient with coughing with sputum production  -dx with pneumonia at [address] [place] [address] ED  -Urine Legionella and strep negative. Procalcitonin 0.513. Rule out TB with sputum AFB x3 and QuantiFERON test.. #Polysubstance use disorder  -uses tobacco, crack cocaine, IV heroin, marijuana  -Last reported drug use 1 week ago  - on stopping  - UDS: Cocaine. .    Diet ADULT DIET; Regular   DVT Prophylaxis [x] Lovenox, []  Heparin, [] SCDs, [] Ambulation,  [] Eliquis, [] Xarelto  [] Coumadin   Code Status Full Code   Disposition From: Home  Expected Disposition: Home  Estimated Date of Discharge: 3/13  Patient requires continued admission due to PNA and cellulitis   Surrogate Decision Maker/ POA      Subjective:     Chief Complaint: No chief complaint on file. Patient had panic attack when trying MRI       Review of Systems:      ROS negative except for as above. Objective:      Intake/Output Summary (Last 24 hours) at 3/9/2023 1634  Last data filed at 3/9/2023 1016  Gross per 24 hour   Intake 100 ml   Output --   Net 100 ml          Vitals:   Vitals:    03/09/23 1517   BP:    Pulse: 66   Resp:    Temp:    SpO2:        Physical Exam:   General: NAD, laying in bed  Eyes: no discharge  HENT: NCAT  Cardiovascular: RRR  Respiratory: may have some rhonchi  Gastrointestinal: Soft, non tender, nondistended  Genitourinary: no suprapubic tenderness  Musculoskeletal: No edema, nontender  Skin: has dressings on both arms and wounds on both legs with dressings  Neuro: Alert. No gross deficits  Psych: Mood appropriate. Medications:   Medications:    silver sulfADIAZINE   Topical Daily    linezolid  600 mg Oral 2 times per day    sodium chloride flush  5-40 mL IntraVENous 2 times per day    enoxaparin  40 mg SubCUTAneous Daily    cefTRIAXone (ROCEPHIN) IV  1,000 mg IntraVENous Q24H    nicotine  1 patch TransDERmal Daily      Infusions:    sodium chloride 50 mL (03/07/23 0524)    lactated ringers IV soln 1,000 mL (03/08/23 1726)     PRN Meds: promethazine, 6.25 mg, Q6H PRN  LORazepam, 1 mg, Once PRN  oxyCODONE-acetaminophen, 1 tablet, Q4H PRN  sodium chloride flush, 5-40 mL, PRN  sodium chloride, , PRN  ondansetron, 4 mg, Q8H PRN   Or  ondansetron, 4 mg, Q6H PRN  polyethylene glycol, 17 g, Daily PRN  acetaminophen, 650 mg, Q6H PRN   Or  acetaminophen, 650 mg, Q6H PRN  ketorolac, 15 mg, Q6H PRN  droperidol, 0.625 mg, Q10 Min PRN      Labs      Recent Results (from the past 24 hour(s))   Strep Pneumoniae Antigen    Collection Time: 03/08/23  7:50 PM    Specimen: CSF   Result Value Ref Range    Strep pneumo Ag URINE NEGATIVE    Legionella antigen, urine    Collection Time: 03/08/23  7:50 PM    Specimen: Urine   Result Value Ref Range    Legionella Urinary Ag NEGATIVE NEGATIVE        Imaging/Diagnostics Last 24 Hours   XR CHEST (2 VW)    Result Date: 3/7/2023  EXAMINATION: TWO XRAY VIEWS OF THE CHEST 3/7/2023 8:05 am COMPARISON: None.  HISTORY: ORDERING SYSTEM PROVIDED HISTORY: abnormal breath sounds on right lung TECHNOLOGIST PROVIDED HISTORY: Reason for exam:->abnormal breath sounds on right lung Reason for Exam: abnormal breath sounds on right lung FINDINGS: Cardiac silhouette appears unremarkable. Consolidation at the right lung base which appears to involve the right middle and lower lobes suggesting pneumonia. No pleural effusion. No pneumothorax. Left lung is clear. Postoperative changes of cholecystectomy. Osseous structures appear intact. 1. Consolidation at the right lung base which appears to involve both the right middle and lower lobes. Findings most suggestive of pneumonia.        Electronically signed by Mikey Mabry MD on 3/9/2023 at 4:34 PM

## 2023-03-09 NOTE — CONSULTS
[facility] 75 Continence Nurse  Consult Note       Vicente King  AGE: 44 y.o. GENDER: female  : 1980  TODAY'S DATE:  3/9/2023    Subjective:     Reason for  Evaluation and Assessment: wound care reassessment. Vicente King is a 44 y.o. female referred by:   [x] Physician  [] Nursing  [] Other:     Wound Identification:  Wound Type:  surgical/Iv drug sites  Contributing Factors: substance abuse        PAST MEDICAL HISTORY        Diagnosis Date    History of smoking at least 2 packs per day for at least 15 years        PAST SURGICAL HISTORY    Past Surgical History:   Procedure Laterality Date    LEG SURGERY Bilateral 3/7/2023    UPPER AND LOWER EXTREMEITIES DEBRIDEMENT INCISION AND DRAINAGE performed by Mandy Casper DO at [address] [place] [address]    History reviewed. No pertinent family history. SOCIAL HISTORY    Social History     Tobacco Use    Smoking status: Every Day     Types: Cigarettes       ALLERGIES    Allergies   Allergen Reactions    Other Hives     Darvocet     Propoxyphene Hives       MEDICATIONS    No current facility-administered medications on file prior to encounter.      Current Outpatient Medications on File Prior to Encounter   Medication Sig Dispense Refill    cloNIDine (CATAPRES) 0.1 MG tablet Take 0.1 mg by mouth daily      hydrOXYzine pamoate (VISTARIL) 25 MG capsule Take 25 mg by mouth 3 times daily as needed for Itching or Anxiety      mirtazapine (REMERON) 30 MG tablet Take 30 mg by mouth nightly      FLUoxetine (PROZAC) 20 MG capsule Take 20 mg by mouth in the morning, at noon, and at bedtime           Objective:      BP (!) 152/78   Pulse 83   Temp 98.4 °F (36.9 °C) (Oral)   Resp 20   Ht 5' 1\" (1.549 m)   Wt 170 lb 14.4 oz (77.5 kg)   SpO2 97%   BMI 32.29 kg/m²   Leo Risk Score: Leo Scale Score: 20    LABS    CBC: No results found for: WBC, RBC, HGB, HCT, MCV, MCH, MCHC, RDW, PLT, MPV  CMP:    Lab Results   Component Value Date/Time    NA 137 03/07/2023 06:35 AM    K 4.5 03/07/2023 06:35 AM     03/07/2023 06:35 AM    CO2 20 03/07/2023 06:35 AM    BUN 9 03/07/2023 06:35 AM    CREATININE 0.7 03/07/2023 06:35 AM    LABGLOM >60 03/07/2023 06:35 AM    GLUCOSE 137 03/07/2023 06:35 AM    CALCIUM 7.9 03/07/2023 06:35 AM     Albumin:  No results found for: LABALBU  PT/INR:  No results found for: PROTIME, INR  HgBA1c:  No results found for: LABA1C      Assessment:     Patient Active Problem List   Diagnosis    Cellulitis    Abscess    Infection due to Streptococcus pyogenes    Heroin use    Crack cocaine use       Measurements:  Wound 03/07/23 Leg Right medial (Active)   Wound Image   03/09/23 1050   Wound Etiology Surgical 03/09/23 1050   Dressing Status New dressing applied 03/09/23 1050   Wound Cleansed Cleansed with saline 03/09/23 1050   Dressing/Treatment Hydrofiber Ag;ABD;Roll gauze 03/09/23 1050   Wound Length (cm) 2 cm 03/09/23 1050   Wound Width (cm) 3.5 cm 03/09/23 1050   Wound Depth (cm) 1 cm 03/09/23 1050   Wound Surface Area (cm^2) 7 cm^2 03/09/23 1050   Change in Wound Size % (l*w) -133.33 03/09/23 1050   Wound Volume (cm^3) 7 cm^3 03/09/23 1050   Wound Healing % -133 03/09/23 1050   Distance Tunneling (cm) 0 cm 03/09/23 1050   Tunneling Position ___ O'Clock 0 03/09/23 1050   Undermining Starts ___ O'Clock 0 03/09/23 1050   Undermining Ends___ O'Clock 0 03/09/23 1050   Undermining Maxium Distance (cm) 0 03/09/23 1050   Wound Assessment Pink/red 03/09/23 1050   Drainage Amount Moderate 03/09/23 1050   Drainage Description Serosanguinous 03/09/23 1050   Odor None 03/09/23 1050   Janet-wound Assessment Intact 03/09/23 1050   Margins Defined edges 03/09/23 1050   Wound Thickness Description not for Pressure Injury Full thickness 03/09/23 1050   Number of days: 2       Wound 03/07/23 Pretibial Left;Proximal Open ulcers x3 (Active)   Wound Image   03/09/23 1050   Wound Etiology Surgical 03/09/23 1050   Dressing Status New dressing applied 03/09/23 1050   Wound Cleansed Cleansed with saline 03/09/23 1050   Dressing/Treatment Hydrofiber Ag;ABD;Roll gauze 03/09/23 1050   Wound Length (cm) 11.5 cm 03/09/23 1050   Wound Width (cm) 7 cm 03/09/23 1050   Wound Depth (cm) 2 cm 03/09/23 1050   Wound Surface Area (cm^2) 80.5 cm^2 03/09/23 1050   Change in Wound Size % (l*w) -69.47 03/09/23 1050   Wound Volume (cm^3) 161 cm^3 03/09/23 1050   Wound Healing % -1595 03/09/23 1050   Distance Tunneling (cm) 0 cm 03/09/23 1050   Tunneling Position ___ O'Clock 0 03/09/23 1050   Undermining Starts ___ O'Clock 0 03/09/23 1050   Undermining Ends___ O'Clock 0 03/09/23 1050   Undermining Maxium Distance (cm) 0 03/09/23 1050   Wound Assessment [place]/red;Slough 03/09/23 1050   Drainage Amount Moderate 03/09/23 1050   Drainage Description Serosanguinous 03/09/23 1050   Odor None 03/09/23 1050   Janet-wound Assessment Intact 03/09/23 1050   Margins Defined edges 03/09/23 1050   Wound Thickness Description not for Pressure Injury Full thickness 03/09/23 1050   Number of days: 2       Wound 03/07/23 Radial Left (Active)   Wound Image   03/09/23 1050   Wound Etiology Surgical 03/09/23 1050   Dressing Status New dressing applied 03/09/23 1050   Wound Cleansed Cleansed with saline 03/09/23 1050   Dressing/Treatment Hydrofiber Ag;Silicone border 87/28/94 1050   Wound Length (cm) 4.4 cm 03/09/23 1050   Wound Width (cm) 2 cm 03/09/23 1050   Wound Depth (cm) 1 cm 03/09/23 1050   Wound Surface Area (cm^2) 8.8 cm^2 03/09/23 1050   Change in Wound Size % (l*w) -1366.67 03/09/23 1050   Wound Volume (cm^3) 8.8 cm^3 03/09/23 1050   Wound Healing % -23857 03/09/23 1050   Distance Tunneling (cm) 0 cm 03/09/23 1050   Tunneling Position ___ O'Clock 0 03/09/23 1050   Undermining Starts ___ O'Clock 0 03/09/23 1050   Undermining Ends___ O'Clock 0 03/09/23 1050   Undermining Maxium Distance (cm) 0 03/09/23 1050   Wound Assessment Pink/red 03/09/23 1050   Drainage Amount Moderate 03/09/23 1050 Drainage Description Serosanguinous 03/09/23 1050   Odor None 03/09/23 1050   Janet-wound Assessment Edematous 03/09/23 1050   Margins Defined edges 03/09/23 1050   Wound Thickness Description not for Pressure Injury Full thickness 03/09/23 1050   Number of days: 1       Wound 03/07/23 Hand Right;Dorsal (Active)   Wound Image   03/09/23 1050   Wound Etiology Surgical 03/09/23 1050   Dressing Status New dressing applied 03/09/23 1050   Wound Cleansed Cleansed with saline 03/09/23 1050   Dressing/Treatment Hydrofiber Ag;Silicone border 69/04/69 1050   Wound Length (cm) 1.7 cm 03/09/23 1050   Wound Width (cm) 1 cm 03/09/23 1050   Wound Depth (cm) 0.8 cm 03/09/23 1050   Wound Surface Area (cm^2) 1.7 cm^2 03/09/23 1050   Wound Volume (cm^3) 1.36 cm^3 03/09/23 1050   Distance Tunneling (cm) 0 cm 03/09/23 1050   Tunneling Position ___ O'Clock 0 03/09/23 1050   Undermining Starts ___ O'Clock 0 03/09/23 1050   Undermining Ends___ O'Clock 0 03/09/23 1050   Undermining Maxium Distance (cm) 0 03/09/23 1050   Wound Assessment Pink/red 03/09/23 1050   Drainage Amount Moderate 03/09/23 1050   Drainage Description Serosanguinous 03/09/23 1050   Odor None 03/09/23 1050   Janet-wound Assessment Intact 03/09/23 1050   Margins Defined edges 03/09/23 1050   Wound Thickness Description not for Pressure Injury Full thickness 03/09/23 1050   Number of days: 1       Wound 03/07/23 Finger (Comment which one) Right thumb (Active)   Wound Image   03/09/23 1050   Wound Etiology Other 03/09/23 1050   Dressing Status New dressing applied 03/09/23 1050   Wound Cleansed Cleansed with saline 03/09/23 1050   Dressing/Treatment Silicone border 54/84/71 1050   Wound Length (cm) 1.3 cm 03/09/23 1050   Wound Width (cm) 1.4 cm 03/09/23 1050   Wound Depth (cm) 0.1 cm 03/09/23 1050   Wound Surface Area (cm^2) 1.82 cm^2 03/09/23 1050   Change in Wound Size % (l*w) -21.33 03/09/23 1050   Wound Volume (cm^3) 0.182 cm^3 03/09/23 1050   Wound Healing % -21 03/09/23 1050   Distance Tunneling (cm) 0 cm 03/09/23 1050   Tunneling Position ___ O'Clock 0 03/09/23 1050   Undermining Starts ___ O'Clock 0 03/09/23 1050   Undermining Ends___ O'Clock 0 03/09/23 1050   Undermining Maxium Distance (cm) 0 03/09/23 1050   Wound Assessment Pink/red 03/09/23 1050   Drainage Amount Small 03/09/23 1050   Drainage Description Serosanguinous 03/09/23 1050   Odor None 03/09/23 1050   Janet-wound Assessment Maceration 03/09/23 1050   Margins Defined edges 03/09/23 1050   Wound Thickness Description not for Pressure Injury Partial thickness 03/09/23 1050   Number of days: 1       Wound 03/07/23 Finger (Comment which one) Left thumb (Active)   Wound Image   03/09/23 1050   Wound Etiology Other 03/09/23 1050   Dressing Status New dressing applied 03/09/23 1050   Wound Cleansed Cleansed with saline 03/09/23 1050   Dressing/Treatment Silicone border 47/47/60 1050   Wound Length (cm) 1 cm 03/09/23 1050   Wound Width (cm) 0.4 cm 03/09/23 1050   Wound Depth (cm) 0.1 cm 03/09/23 1050   Wound Surface Area (cm^2) 0.4 cm^2 03/09/23 1050   Change in Wound Size % (l*w) 50 03/09/23 1050   Wound Volume (cm^3) 0.04 cm^3 03/09/23 1050   Wound Healing % 50 03/09/23 1050   Distance Tunneling (cm) 0 cm 03/09/23 1050   Tunneling Position ___ O'Clock 0 03/09/23 1050   Undermining Starts ___ O'Clock 0 03/09/23 1050   Undermining Ends___ O'Clock 0 03/09/23 1050   Undermining Maxium Distance (cm) 0 03/09/23 1050   Wound Assessment Pink/red 03/09/23 1050   Drainage Amount Small 03/09/23 1050   Drainage Description Serosanguinous 03/09/23 1050   Odor None 03/09/23 1050   Janet-wound Assessment Intact 03/09/23 1050   Margins Defined edges 03/09/23 1050   Wound Thickness Description not for Pressure Injury Partial thickness 03/09/23 1050   Number of days: 1       Wound 03/09/23 Arm Lower;Right (Active)   Wound Image   03/09/23 1050   Wound Etiology Surgical 03/09/23 1050   Dressing Status New dressing applied 03/09/23 1050   Wound Cleansed Cleansed with saline 03/09/23 1050   Dressing/Treatment Hydrofiber Ag;Silicone border 85/96/75 1050   Wound Length (cm) 1.7 cm 03/09/23 1050   Wound Width (cm) 1 cm 03/09/23 1050   Wound Depth (cm) 0.8 cm 03/09/23 1050   Wound Surface Area (cm^2) 1.7 cm^2 03/09/23 1050   Wound Volume (cm^3) 1.36 cm^3 03/09/23 1050   Distance Tunneling (cm) 0 cm 03/09/23 1050   Tunneling Position ___ O'Clock 0 03/09/23 1050   Undermining Starts ___ O'Clock 0 03/09/23 1050   Undermining Ends___ O'Clock 0 03/09/23 1050   Undermining Maxium Distance (cm) 0 03/09/23 1050   Wound Assessment Pink/red 03/09/23 1050   Drainage Amount Moderate 03/09/23 1050   Drainage Description Serosanguinous 03/09/23 1050   Odor None 03/09/23 1050   Janet-wound Assessment Intact 03/09/23 1050   Margins Defined edges 03/09/23 1050   Wound Thickness Description not for Pressure Injury Full thickness 03/09/23 1050   Number of days: 0       Response to treatment: with pain     Pain Assessment:  Severity:  moderate  Quality of pain: sore/sharp  Wound Pain Timing/Severity: wound care  Premedicated: no    Plan:     Plan of Care: Wound 03/07/23 Leg Right medial-Dressing/Treatment: Hydrofiber Ag, ABD, Roll gauze  Wound 03/07/23 Pretibial Left;Proximal Open ulcers x3-Dressing/Treatment: Hydrofiber Ag, ABD, Roll gauze  Wound 03/07/23 Radial Left-Dressing/Treatment: Hydrofiber Ag, Silicone border  Wound 03/07/23 Hand Right;Dorsal-Dressing/Treatment: Hydrofiber Ag, Silicone border  Wound 03/07/23 Finger (Comment which one) Right thumb-Dressing/Treatment: Silicone border  Wound 03/07/23 Finger (Comment which one) Left thumb-Dressing/Treatment: Silicone border  Wound 03/09/23 Arm Lower;Right-Dressing/Treatment: Hydrofiber Ag, Silicone border    Patient in bed family at bedside and Dr Mauro Shoulders at beside to eval wounds s/p debridement. Pt has multiple wounds to arms/legs from IV drug use debridement done 3/7.  Dressings removed patient with pain and shaking due to withdrawal. Cleansed wounds with NS. Rt and lt thumb wounds are superficial applied optifoam border. Silvadene ordered for these per Dr Екатерина Sanchez. Wounds to arms/rt hand/legs with significant depth. Measured and pictured. Filled wounds with Aquacel Ag per flow sheet documentation. Pt is generally not at risk for skin breakdown AEB zulema. Specialty Bed Required : no  [] Low Air Loss   [] Pressure Redistribution  [] Fluid Immersion  [] Bariatric  [] Total Pressure Relief  [] Other:     Discharge Plan:  Placement for patient upon discharge: tbd  Hospice Care: no  Patient appropriate for Outpatient [address]: yes    Patient/Caregiver Teaching:  Level of patient/caregiver understanding able to: pt voiced understanding. Electronically signed by Destini Rhodes.  MIK Garcia, on 3/9/2023 at 12:10 PM

## 2023-03-09 NOTE — PROGRESS NOTES
Spoke to RN concerning pt MRI exams, pt was having a panic attack during first scan. Pt states actively withdrawing and shivering. Pt is claustro. RN to speak to doctor and pt will be attempted tomorrow.

## 2023-03-09 NOTE — PROGRESS NOTES
GENERAL SURGERY PROGRESS NOTE    Marva Chapman is a 44 y.o. female status post I&D of abscesses of all extremities and right hand. Subjective:    Complains of pain. Seen at bedside with wound care today. Objective:    Vitals: VITALS:  BP (!) 152/78   Pulse 83   Temp 98.4 °F (36.9 °C) (Oral)   Resp 20   Ht 5' 1\" (1.549 m)   Wt 170 lb 14.4 oz (77.5 kg)   SpO2 97%   BMI 32.29 kg/m²     I/O: 03/08 0701 - 03/09 0700  In: 10 [I.V.:10]  Out: -     Labs/Imaging Results:   Lab Results   Component Value Date/Time     03/07/2023 06:35 AM    K 4.5 03/07/2023 06:35 AM     03/07/2023 06:35 AM    CO2 20 03/07/2023 06:35 AM    BUN 9 03/07/2023 06:35 AM    CREATININE 0.7 03/07/2023 06:35 AM    GLUCOSE 137 03/07/2023 06:35 AM    CALCIUM 7.9 03/07/2023 06:35 AM      No results found for: WBC, HGB, HCT, MCV, PLT       IV Fluids:   sodium chloride Last Rate: 50 mL (03/07/23 0524)    lactated ringers IV soln Last Rate: 1,000 mL (03/08/23 1726)    Scheduled Meds:   silver sulfADIAZINE, , Topical, Daily    linezolid, 600 mg, Oral, 2 times per day    sodium chloride flush, 5-40 mL, IntraVENous, 2 times per day    enoxaparin, 40 mg, SubCUTAneous, Daily    cefTRIAXone (ROCEPHIN) IV, 1,000 mg, IntraVENous, Q24H    nicotine, 1 patch, TransDERmal, Daily    Physical Exam:  General: A&O x 3, no distress. HEENT: Anicteric sclerae, MMM. Extremities: No edema bilat LE. Abdomen: Soft, nondistended, nontender     Wounds all with clean wound bases with minimal drainage. Assessment and Plan:     Patient Active Problem List:     Cellulitis     Abscess     Infection due to Streptococcus pyogenes     Heroin use     Crack cocaine use      Principal Problem:    Cellulitis  Plan: Active Problems:    Abscess  Plan:       Infection due to Streptococcus pyogenes  Plan:       Heroin use  Plan:       Crack cocaine use      Abscesses/wounds related to IVDA.      Continue dressing changes per wound care daily:    Treatment Team Sticky Notes  Comment  Wound care- left medial leg cluster/rt medial leg cleanse with NS fill with  Aquacel Ag abd kerlix change daily and prn. When removing dressing soak with NS. Rt thumb/lt thumb/cleanse with NS apply Silvadene cream cover with  optifoam border change daily and prn. Rt arm/rt hand/lt arm cleanse with NS fill with Aquacel Ag cover with optifoam border change daily and prn. Pt is generally not at risk for skin breakdown AEB zulema.           Patient to follow-up in wound care after discharge         Jojo Vallecillo DO

## 2023-03-09 NOTE — PROGRESS NOTES
Infectious Disease Progress Note  3/9/2023   Patient Name: Randy Montana : 1980   Impression  Sepsis Secondary to:   Multiple Abscess on Extremities Secondary to IVDU:  Pneumonia of the Right Lung with Resolved Hemoptysis:  Mid to Low Back Pain, Concern for OM/ Discitis/ Abscess:  Past Endocarditis:  Need to R/o TB:  Afebrile  CrCl 99  No reported allergies to ABX  Pct 0.513  3/7-BC Pending  3/8-BC Pending  3/7-CXR 2 View: Consolidation at the right lung base which appears to involve both the right middle and lower lobes. Findings most suggestive of pneumonia. 3/7-S/p Dr. Bita Luis: Upper and lower extremities I&D. DX: multiple abscesses of all extremities. Surgical cultures: Pending  3/8-Echo complete: EF 55-60%, no mention of IE  IVDU of Heroin/ Crack Cocaine:              Reports has used x 18 years (since age 26)  Marijuana Use/ Tobacco Abuse:  Homelessness:  Obesity:  BMI 32.29  Multi-morbidity: per PMHx     Plan:  Continue IV ceftriaxone 1 gm q24h  DC IV vancomycin as patient has not been able to obtain vanc levels,   Start po linezolid 600 mg bid for GP coverage  Continue po Flagyl 500 mg tid  Trend CRP and Pct, ordered  Await BC  Await surgical cultures  Ordered MRSA screen and resp culture, Strep pna and legionella ag  Ordered MRI C/T/L spines w wo contrast to eval for OM/ discitis/ abscess  Ordered Complete echo to eval for IE  Ordered airborne isolation while evaluating for TB as pt had hemoptysis  Ordered Quantiferon TB, AFB x 3 days    Ongoing Antimicrobial Therapy  Vancomycin 3/7-  Ceftriaxone 3/7-  Flagyl 3/8-? Completed Antimicrobial Therapy  ? History:? Interval history noted. Chief complaint: sepsis secondary to Streptococcus pyogenes infection, abscess on all extremities. RLL pneumonia.    Denies n/v/d/f or untoward effects of antibiotics  Physical Exam:  Vital Signs: BP (!) 152/78   Pulse 83   Temp 98.4 °F (36.9 °C) (Oral)   Resp 20   Ht 5' 1\" (1.549 m)   Wt 170 lb 14.4 oz (77.5 kg)   SpO2 97%   BMI 32.29 kg/m²     Gen: tearful and restless, oriented x 4  Wounds: C/D/I multiple dressings intact on bilateral hands, arms, and lower legs  Chest: no distress and CTA. Posterior breath sounds with scattered rhonchi to diminished bases. Room air. Occasional productive cough. Tenderness to light palpation mid to low back. Heart: NSR and no MRG. Abd: soft, non-distended, no tenderness, no hepatomegaly. Normoactive bowel sounds. Ext: no clubbing, cyanosis, or edema  Neuro: Mental status intact. CN 2-12 intact and no focal sensory or motor deficits     Radiologic / Imaging / TESTING  3/7/2023 XR Chest (2 VW):  Impression   1. Consolidation at the right lung base which appears to involve both the   right middle and lower lobes. Findings most suggestive of pneumonia. 3/8/2023 Complete Echo:   Summary   Left ventricular systolic function is normal.   Ejection fraction is visually estimated at 55-60%. Mild to moderate tricuspid regurgitation; RVSP: 44.22mmHg; mild PHTN. No evidence of any pericardial effusion.     Labs:    Recent Results (from the past 24 hour(s))   Echocardiogram complete 2D with doppler with color    Collection Time: 03/08/23  2:52 PM   Result Value Ref Range    Left Ventricular Ejection Fraction 58     LVEF MODALITY ECHO    Strep Pneumoniae Antigen    Collection Time: 03/08/23  7:50 PM    Specimen: CSF   Result Value Ref Range    Strep pneumo Ag URINE NEGATIVE    Legionella antigen, urine    Collection Time: 03/08/23  7:50 PM    Specimen: Urine   Result Value Ref Range    Legionella Urinary Ag NEGATIVE NEGATIVE     CULTURE results: Invalid input(s): BLOOD CULTURE,  URINE CULTURE, SURGICAL CULTURE    Diagnosis:  Patient Active Problem List   Diagnosis    Cellulitis    Abscess    Infection due to Streptococcus pyogenes    Heroin use    Crack cocaine use       Active Problems  Principal Problem:    Cellulitis  Active Problems:    Abscess    Infection due to Streptococcus pyogenes    Heroin use    Crack cocaine use  Resolved Problems:    * No resolved hospital problems. *    Electronically signed by: Electronically signed by REMY Mota CNP on 3/9/2023 at 2:06 PM

## 2023-03-09 NOTE — PROGRESS NOTES
Lab called and informed this RN that 3 different phlebotomists tried to draw pt.'s scheduled labs for the day with no success. They have to wait 24hrs until they can re-attempt drawing labs. Lab will come back to try to get labs again between 11pm-12am tonight. Dr. Andrey Alonso made aware of lab delay and reasoning.  LG

## 2023-03-10 ENCOUNTER — APPOINTMENT (OUTPATIENT)
Dept: MRI IMAGING | Age: 43
DRG: 364 | End: 2023-03-10
Attending: INTERNAL MEDICINE
Payer: MEDICAID

## 2023-03-10 LAB
AFB SMEAR: NORMAL
ANION GAP SERPL CALCULATED.3IONS-SCNC: 11 MMOL/L (ref 4–16)
BASOPHILS ABSOLUTE: 0 K/CU MM
BASOPHILS RELATIVE PERCENT: 0.2 % (ref 0–1)
BUN SERPL-MCNC: 13 MG/DL (ref 6–23)
CALCIUM SERPL-MCNC: 8 MG/DL (ref 8.3–10.6)
CHLORIDE BLD-SCNC: 105 MMOL/L (ref 99–110)
CO2: 24 MMOL/L (ref 21–32)
CREAT SERPL-MCNC: 0.7 MG/DL (ref 0.6–1.1)
CRP SERPL HS-MCNC: 32.9 MG/L
CULTURE: NORMAL
CULTURE: NORMAL
DIFFERENTIAL TYPE: ABNORMAL
EOSINOPHILS ABSOLUTE: 0.1 K/CU MM
EOSINOPHILS RELATIVE PERCENT: 1.8 % (ref 0–3)
GFR SERPL CREATININE-BSD FRML MDRD: >60 ML/MIN/1.73M2
GLUCOSE SERPL-MCNC: 84 MG/DL (ref 70–99)
GRAM SMEAR: NORMAL
HBV SURFACE AB SERPL IA-ACNC: 987.4 M[IU]/ML
HCT VFR BLD CALC: 35 % (ref 37–47)
HCV AB SERPL QL IA: ABNORMAL
HEMOGLOBIN: 10.9 GM/DL (ref 12.5–16)
IMMATURE NEUTROPHIL %: 0.6 % (ref 0–0.43)
LYMPHOCYTES ABSOLUTE: 1.4 K/CU MM
LYMPHOCYTES RELATIVE PERCENT: 27 % (ref 24–44)
Lab: NORMAL
MCH RBC QN AUTO: 28.5 PG (ref 27–31)
MCHC RBC AUTO-ENTMCNC: 31.1 % (ref 32–36)
MCV RBC AUTO: 91.4 FL (ref 78–100)
MONOCYTES ABSOLUTE: 0.3 K/CU MM
MONOCYTES RELATIVE PERCENT: 5.2 % (ref 0–4)
NUCLEATED RBC %: 0 %
PDW BLD-RTO: 13.4 % (ref 11.7–14.9)
PLATELET # BLD: 285 K/CU MM (ref 140–440)
PMV BLD AUTO: 9 FL (ref 7.5–11.1)
POTASSIUM SERPL-SCNC: 4.2 MMOL/L (ref 3.5–5.1)
RBC # BLD: 3.83 M/CU MM (ref 4.2–5.4)
SEGMENTED NEUTROPHILS ABSOLUTE COUNT: 3.3 K/CU MM
SEGMENTED NEUTROPHILS RELATIVE PERCENT: 65.2 % (ref 36–66)
SODIUM BLD-SCNC: 140 MMOL/L (ref 135–145)
SPECIMEN: NORMAL
TOTAL IMMATURE NEUTOROPHIL: 0.03 K/CU MM
TOTAL NUCLEATED RBC: 0 K/CU MM
WBC # BLD: 5 K/CU MM (ref 4–10.5)

## 2023-03-10 PROCEDURE — 86706 HEP B SURFACE ANTIBODY: CPT

## 2023-03-10 PROCEDURE — 87015 SPECIMEN INFECT AGNT CONCNTJ: CPT

## 2023-03-10 PROCEDURE — 2580000003 HC RX 258: Performed by: ANESTHESIOLOGY

## 2023-03-10 PROCEDURE — 6360000002 HC RX W HCPCS: Performed by: INTERNAL MEDICINE

## 2023-03-10 PROCEDURE — 86704 HEP B CORE ANTIBODY TOTAL: CPT

## 2023-03-10 PROCEDURE — 86708 HEPATITIS A ANTIBODY: CPT

## 2023-03-10 PROCEDURE — 6370000000 HC RX 637 (ALT 250 FOR IP): Performed by: NURSE PRACTITIONER

## 2023-03-10 PROCEDURE — 2580000003 HC RX 258: Performed by: INTERNAL MEDICINE

## 2023-03-10 PROCEDURE — 80048 BASIC METABOLIC PNL TOTAL CA: CPT

## 2023-03-10 PROCEDURE — 86140 C-REACTIVE PROTEIN: CPT

## 2023-03-10 PROCEDURE — 94761 N-INVAS EAR/PLS OXIMETRY MLT: CPT

## 2023-03-10 PROCEDURE — 1200000000 HC SEMI PRIVATE

## 2023-03-10 PROCEDURE — 85025 COMPLETE CBC W/AUTO DIFF WBC: CPT

## 2023-03-10 PROCEDURE — 6360000002 HC RX W HCPCS: Performed by: NURSE PRACTITIONER

## 2023-03-10 PROCEDURE — 6370000000 HC RX 637 (ALT 250 FOR IP): Performed by: STUDENT IN AN ORGANIZED HEALTH CARE EDUCATION/TRAINING PROGRAM

## 2023-03-10 PROCEDURE — 87116 MYCOBACTERIA CULTURE: CPT

## 2023-03-10 PROCEDURE — 36415 COLL VENOUS BLD VENIPUNCTURE: CPT

## 2023-03-10 PROCEDURE — 86803 HEPATITIS C AB TEST: CPT

## 2023-03-10 RX ORDER — LORAZEPAM 2 MG/ML
1 INJECTION INTRAMUSCULAR PRN
Status: COMPLETED | OUTPATIENT
Start: 2023-03-11 | End: 2023-03-12

## 2023-03-10 RX ORDER — LORAZEPAM 2 MG/ML
1 INJECTION INTRAMUSCULAR ONCE
Status: COMPLETED | OUTPATIENT
Start: 2023-03-10 | End: 2023-03-11

## 2023-03-10 RX ORDER — LORAZEPAM 2 MG/ML
1 INJECTION INTRAMUSCULAR ONCE
Status: DISCONTINUED | OUTPATIENT
Start: 2023-03-11 | End: 2023-03-10

## 2023-03-10 RX ADMIN — LINEZOLID 600 MG: 600 TABLET, FILM COATED ORAL at 09:38

## 2023-03-10 RX ADMIN — ENOXAPARIN SODIUM 40 MG: 100 INJECTION SUBCUTANEOUS at 09:40

## 2023-03-10 RX ADMIN — KETOROLAC TROMETHAMINE 15 MG: 30 INJECTION, SOLUTION INTRAMUSCULAR; INTRAVENOUS at 23:22

## 2023-03-10 RX ADMIN — KETOROLAC TROMETHAMINE 15 MG: 30 INJECTION, SOLUTION INTRAMUSCULAR; INTRAVENOUS at 16:30

## 2023-03-10 RX ADMIN — KETOROLAC TROMETHAMINE 15 MG: 30 INJECTION, SOLUTION INTRAMUSCULAR; INTRAVENOUS at 09:37

## 2023-03-10 RX ADMIN — OXYCODONE AND ACETAMINOPHEN 1 TABLET: 5; 325 TABLET ORAL at 12:58

## 2023-03-10 RX ADMIN — OXYCODONE AND ACETAMINOPHEN 1 TABLET: 5; 325 TABLET ORAL at 20:23

## 2023-03-10 RX ADMIN — SILVER SULFADIAZINE: 10 CREAM TOPICAL at 09:39

## 2023-03-10 RX ADMIN — SODIUM CHLORIDE, PRESERVATIVE FREE 5 ML: 5 INJECTION INTRAVENOUS at 09:55

## 2023-03-10 RX ADMIN — OXYCODONE AND ACETAMINOPHEN 1 TABLET: 5; 325 TABLET ORAL at 01:50

## 2023-03-10 RX ADMIN — CEFTRIAXONE SODIUM 1000 MG: 1 INJECTION, POWDER, FOR SOLUTION INTRAMUSCULAR; INTRAVENOUS at 05:01

## 2023-03-10 RX ADMIN — SODIUM CHLORIDE, POTASSIUM CHLORIDE, SODIUM LACTATE AND CALCIUM CHLORIDE 1000 ML: 600; 310; 30; 20 INJECTION, SOLUTION INTRAVENOUS at 16:25

## 2023-03-10 RX ADMIN — LINEZOLID 600 MG: 600 TABLET, FILM COATED ORAL at 20:18

## 2023-03-10 ASSESSMENT — PAIN SCALES - GENERAL
PAINLEVEL_OUTOF10: 7
PAINLEVEL_OUTOF10: 10
PAINLEVEL_OUTOF10: 8
PAINLEVEL_OUTOF10: 0
PAINLEVEL_OUTOF10: 7
PAINLEVEL_OUTOF10: 7
PAINLEVEL_OUTOF10: 5
PAINLEVEL_OUTOF10: 0
PAINLEVEL_OUTOF10: 4
PAINLEVEL_OUTOF10: 8

## 2023-03-10 ASSESSMENT — PAIN - FUNCTIONAL ASSESSMENT
PAIN_FUNCTIONAL_ASSESSMENT: ACTIVITIES ARE NOT PREVENTED

## 2023-03-10 ASSESSMENT — PAIN DESCRIPTION - DESCRIPTORS
DESCRIPTORS: ACHING

## 2023-03-10 ASSESSMENT — PAIN SCALES - WONG BAKER
WONGBAKER_NUMERICALRESPONSE: 0
WONGBAKER_NUMERICALRESPONSE: 2

## 2023-03-10 ASSESSMENT — PAIN DESCRIPTION - ONSET: ONSET: ON-GOING

## 2023-03-10 ASSESSMENT — PAIN DESCRIPTION - FREQUENCY: FREQUENCY: INTERMITTENT

## 2023-03-10 ASSESSMENT — PAIN DESCRIPTION - LOCATION
LOCATION: LEG
LOCATION: LEG;ARM
LOCATION: ARM;LEG
LOCATION: ARM;LEG
LOCATION: GENERALIZED
LOCATION: ARM;LEG

## 2023-03-10 ASSESSMENT — PAIN DESCRIPTION - ORIENTATION
ORIENTATION: LEFT;RIGHT
ORIENTATION: RIGHT;LEFT

## 2023-03-10 NOTE — PROGRESS NOTES
Pt was scheduled for MRI after refusing to go yesterday. Pt refused to go to MRI again today stating that her  was coming up to visit, and he is limited on his time to come d/t disability and she did not want to miss him. This RN informed the provider, Dr. Mindi Mcdaniel, and MRI department. MRI said they will have to reschedule her MRI for tomorrow, 3/11/23, as they cannot fit the pt into their schedule for later today. Pt informed on having to wait until tomorrow for MRI now, and was agreeable.  LG

## 2023-03-10 NOTE — PROGRESS NOTES
Comprehensive Nutrition Assessment    Type and Reason for Visit:  Reassess    Nutrition Recommendations/Plan:   Continue Regular Diet  Begin wound healing oral nutrition supplement bid  Begin low jayden high protein oral nutrition supplement bid, between meals     Malnutrition Assessment:  Malnutrition Status:  Insufficient data (03/07/23 1437)    Context:  Social/Environmental Circumstances       Nutrition Assessment:    Pt feeding self and consuming greater than half to all of recent meals on Regular Diet. Will order oral supplements to optimize po intake for healing. Continue to follow as moderate nutrition risk.    Nutrition Related Findings:    Glu 137   Wound Type: Multiple, Venous Stasis, Open Wounds       Current Nutrition Intake & Therapies:    Average Meal Intake: 51-75%, %  Average Supplements Intake: None Ordered  ADULT DIET; Regular    Anthropometric Measures:  Height: 5' 1\" (154.9 cm)  Ideal Body Weight (IBW): 105 lbs (48 kg)    Admission Body Weight: 163 lb 8 oz (74.2 kg)  Current Body Weight: 170 lb 14.4 oz (77.5 kg),   IBW.    Current BMI (kg/m2): 32.3  Usual Body Weight:  (n/a)                       BMI Categories: Obese Class 1 (BMI 30.0-34.9)    Estimated Daily Nutrient Needs:  Energy Requirements Based On: Formula  Weight Used for Energy Requirements: Current  Energy (kcal/day): 1606 (MSJ)  Weight Used for Protein Requirements: Ideal  Protein (g/day): 57-67 (1.2-1.4 g/kg IBW)  Method Used for Fluid Requirements: 1 ml/kcal  Fluid (ml/day): 1600    Nutrition Diagnosis:   Predicted inadequate energy intake related to increase demand for energy/nutrients as evidenced by poor intake prior to admission, wounds    Nutrition Interventions:   Food and/or Nutrient Delivery: Continue Current Diet, Start Oral Nutrition Supplement  Nutrition Education/Counseling: No recommendation at this time  Coordination of Nutrition Care: Continue to monitor while inpatient       Goals:  Previous Goal Met: Progressing  toward Goal(s)  Goals: Meet at least 75% of estimated needs       Nutrition Monitoring and Evaluation:   Behavioral-Environmental Outcomes: None Identified  Food/Nutrient Intake Outcomes: Food and Nutrient Intake, Supplement Intake  Physical Signs/Symptoms Outcomes: Biochemical Data, GI Status, Meal Time Behavior, Skin, Weight    Discharge Planning:    Continue Oral Nutrition Supplement     Leonor Aiken, [address] [place] [address],   Contact: [phone]

## 2023-03-10 NOTE — PROGRESS NOTES
03/10/23 1405   Encounter Summary   Encounter Overview/Reason  Initial Encounter   Service Provided For: Patient   Referral/Consult From: Marisol Mcmahan   Last Encounter  03/10/23  (Patient is Rastafarian.  Wish to be added to communion list)   Complexity of Encounter Low   Begin Time 1400   End Time  1406   Total Time Calculated 6 min   Encounter    Type Initial Screen/Assessment   Spiritual/Emotional needs   Type Spiritual Support   Rituals, Rites and Sacraments   Type   (patient is Rastafarian)   Assessment/Intervention/Outcome   Assessment Hopeful   Intervention Discussed belief system/Baptism practices/joanne;Sustaining Presence/Ministry of presence   Outcome Engaged in conversation;Expressed Gratitude   Plan and Referrals   Plan/Referrals Placed on [facility] List

## 2023-03-10 NOTE — PROGRESS NOTES
V2.0  [facility] [place] Hospitalist Progress Note      Name:  Nakul Sher /Age/Sex: 1980  (44 y.o. female)   MRN & CSN:  [id number] & [id number] Encounter Date/Time: 3/10/2023 9:37 AM EST    Location:  [address] [place] [address] PCP: No primary care provider on file. Hospital Day: 4    Assessment and Plan:   Nakul Sher is a 44 y.o. female with pmh of polysubstance use who presents with Cellulitis      Plan:  #Abscesses and purulent cellulitis of BLE and BUE  -has multiple areas of induration on UEs and LEs  -Abscesses primarily at the site of IV drug injection  -no fevers  -general surgery consulted  -s/p I&D of upper and lower extremity abscesses 3/7  - Off vancomycin since cannot monitor Vanco levels, start p.o. linezolid. Continue Rocephin. Surgical culture 3/7: Strep pyogenes. Echo: EF 55-60%, no mention of vegetations. MRI cannot be done due to panic attack. Reattempt tomorrow with Ativan. Wound care following.  - 3/10: CRP 32. AFB Cx: Negative x1. Patient declined MRI again due to wanting Cr , visitor. Attempt again tomorrow. #Possible R-sided pneumonia  -patient with coughing with sputum production  -dx with pneumonia at [facility] ED  -Urine Legionella and strep negative. Procalcitonin 0.513. Rule out TB with sputum AFB x3 and QuantiFERON test.. #Polysubstance use disorder  -uses tobacco, crack cocaine, IV heroin, marijuana  -Last reported drug use 1 week ago  - on stopping  - UDS: Cocaine. Diet ADULT DIET; Regular   DVT Prophylaxis [x] Lovenox, []  Heparin, [] SCDs, [] Ambulation,  [] Eliquis, [] Xarelto  [] Coumadin   Code Status Full Code   Disposition From: Home  Expected Disposition: Home  Estimated Date of Discharge: 3/13  Patient requires continued admission due to PNA and cellulitis   Surrogate Decision Maker/ POA      Subjective:     Chief Complaint: No chief complaint on file. Has some bloody in cough but it is better. Otherwise she is doing well.      Review of Systems:      ROS negative except for as above. Objective: Intake/Output Summary (Last 24 hours) at 3/10/2023 1219  Last data filed at 3/9/2023 1752  Gross per 24 hour   Intake 2720.21 ml   Output --   Net 2720.21 ml        Vitals:   Vitals:    03/10/23 0915   BP: 135/75   Pulse: 62   Resp: 17   Temp: 98.4 °F (36.9 °C)   SpO2: 99%       Physical Exam:   General: NAD, laying in bed  Eyes: no discharge  HENT: NCAT  Cardiovascular: RRR  Respiratory: cta b/l bs+  Gastrointestinal: Soft, non tender, nondistended  Genitourinary: no suprapubic tenderness  Musculoskeletal: No edema, nontender  Skin: has dressings on both arms and wounds on both legs with dressings  Neuro: Alert. No gross deficits  Psych: Mood appropriate.          Medications:   Medications:    silver sulfADIAZINE   Topical Daily    linezolid  600 mg Oral 2 times per day    sodium chloride flush  5-40 mL IntraVENous 2 times per day    enoxaparin  40 mg SubCUTAneous Daily    cefTRIAXone (ROCEPHIN) IV  1,000 mg IntraVENous Q24H    nicotine  1 patch TransDERmal Daily      Infusions:    sodium chloride Stopped (03/07/23 1559)    lactated ringers IV soln 1,000 mL (03/09/23 1752)     PRN Meds: promethazine, 6.25 mg, Q6H PRN  LORazepam, 1 mg, Once PRN  oxyCODONE-acetaminophen, 1 tablet, Q4H PRN  sodium chloride flush, 5-40 mL, PRN  sodium chloride, , PRN  ondansetron, 4 mg, Q8H PRN   Or  ondansetron, 4 mg, Q6H PRN  polyethylene glycol, 17 g, Daily PRN  acetaminophen, 650 mg, Q6H PRN   Or  acetaminophen, 650 mg, Q6H PRN  ketorolac, 15 mg, Q6H PRN  droperidol, 0.625 mg, Q10 Min PRN      Labs      Recent Results (from the past 24 hour(s))   CBC with Auto Differential    Collection Time: 03/10/23 12:59 AM   Result Value Ref Range    WBC 5.0 4.0 - 10.5 K/CU MM    RBC 3.83 (L) 4.2 - 5.4 M/CU MM    Hemoglobin 10.9 (L) 12.5 - 16.0 GM/DL    Hematocrit 35.0 (L) 37 - 47 %    MCV 91.4 78 - 100 FL    MCH 28.5 27 - 31 PG    MCHC 31.1 (L) 32.0 - 36.0 %    RDW 13.4 11.7 - 14.9 %    Platelets 594 504 - 820 K/CU MM    MPV 9.0 7.5 - 11.1 FL    Differential Type AUTOMATED DIFFERENTIAL     Segs Relative 65.2 36 - 66 %    Lymphocytes % 27.0 24 - 44 %    Monocytes % 5.2 (H) 0 - 4 %    Eosinophils % 1.8 0 - 3 %    Basophils % 0.2 0 - 1 %    Segs Absolute 3.3 K/CU MM    Lymphocytes Absolute 1.4 K/CU MM    Monocytes Absolute 0.3 K/CU MM    Eosinophils Absolute 0.1 K/CU MM    Basophils Absolute 0.0 K/CU MM    Nucleated RBC % 0.0 %    Total Nucleated RBC 0.0 K/CU MM    Total Immature Neutrophil 0.03 K/CU MM    Immature Neutrophil % 0.6 (H) 0 - 0.43 %   C-Reactive Protein    Collection Time: 03/10/23 12:59 AM   Result Value Ref Range    CRP High Sensitivity 32.9 (H) <5.0 mg/L        Imaging/Diagnostics Last 24 Hours   XR CHEST (2 VW)    Result Date: 3/7/2023  EXAMINATION: TWO XRAY VIEWS OF THE CHEST 3/7/2023 8:05 am COMPARISON: None. HISTORY: ORDERING SYSTEM PROVIDED HISTORY: abnormal breath sounds on right lung TECHNOLOGIST PROVIDED HISTORY: Reason for exam:->abnormal breath sounds on right lung Reason for Exam: abnormal breath sounds on right lung FINDINGS: Cardiac silhouette appears unremarkable. Consolidation at the right lung base which appears to involve the right middle and lower lobes suggesting pneumonia. No pleural effusion. No pneumothorax. Left lung is clear. Postoperative changes of cholecystectomy. Osseous structures appear intact. 1. Consolidation at the right lung base which appears to involve both the right middle and lower lobes. Findings most suggestive of pneumonia.        Electronically signed by Joelle Dunaway MD on 3/10/2023 at 12:19 PM

## 2023-03-11 ENCOUNTER — APPOINTMENT (OUTPATIENT)
Dept: MRI IMAGING | Age: 43
DRG: 364 | End: 2023-03-11
Attending: INTERNAL MEDICINE
Payer: MEDICAID

## 2023-03-11 PROCEDURE — 6370000000 HC RX 637 (ALT 250 FOR IP): Performed by: STUDENT IN AN ORGANIZED HEALTH CARE EDUCATION/TRAINING PROGRAM

## 2023-03-11 PROCEDURE — 94761 N-INVAS EAR/PLS OXIMETRY MLT: CPT

## 2023-03-11 PROCEDURE — 2580000003 HC RX 258: Performed by: INTERNAL MEDICINE

## 2023-03-11 PROCEDURE — 6360000002 HC RX W HCPCS: Performed by: INTERNAL MEDICINE

## 2023-03-11 PROCEDURE — 6360000002 HC RX W HCPCS: Performed by: NURSE PRACTITIONER

## 2023-03-11 PROCEDURE — 87116 MYCOBACTERIA CULTURE: CPT

## 2023-03-11 PROCEDURE — 6370000000 HC RX 637 (ALT 250 FOR IP): Performed by: NURSE PRACTITIONER

## 2023-03-11 PROCEDURE — 1200000000 HC SEMI PRIVATE

## 2023-03-11 PROCEDURE — 2580000003 HC RX 258: Performed by: ANESTHESIOLOGY

## 2023-03-11 PROCEDURE — 87015 SPECIMEN INFECT AGNT CONCNTJ: CPT

## 2023-03-11 PROCEDURE — 6360000002 HC RX W HCPCS: Performed by: HOSPITALIST

## 2023-03-11 RX ORDER — LORAZEPAM 2 MG/ML
1 INJECTION INTRAMUSCULAR
Status: ACTIVE | OUTPATIENT
Start: 2023-03-12 | End: 2023-03-13

## 2023-03-11 RX ADMIN — ENOXAPARIN SODIUM 40 MG: 100 INJECTION SUBCUTANEOUS at 09:49

## 2023-03-11 RX ADMIN — CEFTRIAXONE SODIUM 1000 MG: 1 INJECTION, POWDER, FOR SOLUTION INTRAMUSCULAR; INTRAVENOUS at 04:54

## 2023-03-11 RX ADMIN — KETOROLAC TROMETHAMINE 15 MG: 30 INJECTION, SOLUTION INTRAMUSCULAR; INTRAVENOUS at 18:11

## 2023-03-11 RX ADMIN — LORAZEPAM 1 MG: 2 INJECTION INTRAMUSCULAR; INTRAVENOUS at 10:52

## 2023-03-11 RX ADMIN — ONDANSETRON 4 MG: 2 INJECTION INTRAMUSCULAR; INTRAVENOUS at 01:45

## 2023-03-11 RX ADMIN — SODIUM CHLORIDE, POTASSIUM CHLORIDE, SODIUM LACTATE AND CALCIUM CHLORIDE 1000 ML: 600; 310; 30; 20 INJECTION, SOLUTION INTRAVENOUS at 18:15

## 2023-03-11 RX ADMIN — LINEZOLID 600 MG: 600 TABLET, FILM COATED ORAL at 09:48

## 2023-03-11 RX ADMIN — OXYCODONE AND ACETAMINOPHEN 1 TABLET: 5; 325 TABLET ORAL at 04:51

## 2023-03-11 RX ADMIN — OXYCODONE AND ACETAMINOPHEN 1 TABLET: 5; 325 TABLET ORAL at 21:29

## 2023-03-11 RX ADMIN — KETOROLAC TROMETHAMINE 15 MG: 30 INJECTION, SOLUTION INTRAMUSCULAR; INTRAVENOUS at 09:48

## 2023-03-11 RX ADMIN — SILVER SULFADIAZINE: 10 CREAM TOPICAL at 09:48

## 2023-03-11 RX ADMIN — LINEZOLID 600 MG: 600 TABLET, FILM COATED ORAL at 21:22

## 2023-03-11 ASSESSMENT — PAIN DESCRIPTION - ORIENTATION
ORIENTATION: RIGHT;LEFT
ORIENTATION: RIGHT;LEFT;LOWER;UPPER
ORIENTATION: RIGHT;LEFT

## 2023-03-11 ASSESSMENT — PAIN DESCRIPTION - LOCATION
LOCATION: ARM;LEG
LOCATION: HAND;LEG
LOCATION: GENERALIZED

## 2023-03-11 ASSESSMENT — PAIN SCALES - GENERAL
PAINLEVEL_OUTOF10: 0
PAINLEVEL_OUTOF10: 9
PAINLEVEL_OUTOF10: 7
PAINLEVEL_OUTOF10: 10
PAINLEVEL_OUTOF10: 10
PAINLEVEL_OUTOF10: 8

## 2023-03-11 ASSESSMENT — PAIN SCALES - WONG BAKER: WONGBAKER_NUMERICALRESPONSE: 0

## 2023-03-11 ASSESSMENT — PAIN DESCRIPTION - FREQUENCY: FREQUENCY: INTERMITTENT

## 2023-03-11 ASSESSMENT — PAIN DESCRIPTION - PAIN TYPE: TYPE: ACUTE PAIN

## 2023-03-11 ASSESSMENT — PAIN DESCRIPTION - DESCRIPTORS
DESCRIPTORS: ACHING
DESCRIPTORS: DULL
DESCRIPTORS: ACHING

## 2023-03-11 ASSESSMENT — PAIN - FUNCTIONAL ASSESSMENT: PAIN_FUNCTIONAL_ASSESSMENT: ACTIVITIES ARE NOT PREVENTED

## 2023-03-11 NOTE — PROGRESS NOTES
Pt sent back to floor, MRI exams are incomplete. Pt was starting to experience a lot of pain. I told her we were past half way and we can finish up tomorrow. I will send for this pt first tomorrow and we will finish up the MRI exams at that time. RN aware.

## 2023-03-11 NOTE — PROGRESS NOTES
V2.0  [facility] Hospitalist Progress Note      Name:  Nakul Sher /Age/Sex: 1980  (44 y.o. female)   MRN & CSN:  [id number] & [id number] Encounter Date/Time: 3/11/2023 9:37 AM EST    Location:  [address] [place] [address]- PCP: No primary care provider on file. Hospital Day: 5    Assessment and Plan:   Nakul Sher is a 44 y.o. female with pmh of polysubstance use who presents with Cellulitis      Plan:  #Abscesses and purulent cellulitis of BLE and BUE  -has multiple areas of induration on UEs and LEs  -Abscesses primarily at the site of IV drug injection  -s/p I&D of upper and lower extremity abscesses 3/7  - Off vancomycin since cannot monitor Vanco levels, start p.o. linezolid. Continue Rocephin. Surgical culture 3/7: Strep pyogenes. Echo: EF 55-60%, no mention of vegetations. Wound care following.  - 3/10: CRP 32. AFB Cx: Negative x1.    -3/11: MRI spine performed, but not entirely completed. Patient to complete rest of MRI imaging tomorrow. #Possible R-sided pneumonia  -patient with coughing with sputum production  -dx with pneumonia at [facility] ED  -Urine Legionella and strep negative. Procalcitonin 0.513. Rule out TB with sputum AFB x3 and QuantiFERON test.  -3/11: On 1.5 L nasal cannula. MRSA nares negative. #Polysubstance use disorder  -uses tobacco, crack cocaine, IV heroin, marijuana  -Last reported drug use 1 week ago  - on stopping  - UDS: Cocaine. Hepatitis C antibody positive  -3/11: Check hepatitis C quantitative PCR. Diet ADULT DIET; Regular  ADULT ORAL NUTRITION SUPPLEMENT; Breakfast, Dinner; Wound Healing Oral Supplement  ADULT ORAL NUTRITION SUPPLEMENT; Lunch, Dinner;  Low Calorie/High Protein Oral Supplement   DVT Prophylaxis [x] Lovenox, []  Heparin, [] SCDs, [] Ambulation,  [] Eliquis, [] Xarelto  [] Coumadin   Code Status Full Code   Disposition From: Home  Expected Disposition: Home  Estimated Date of Discharge: 3/14  Patient requires continued admission due to PNA and cellulitis   Surrogate Decision Maker/ POA      Subjective:     Chief Complaint: No chief complaint on file. Went for MRI, but was not able to complete it entirely. Still had another 25 minutes left. Ativan helped her for most of it. Review of Systems:      ROS negative except for as above. Objective:   No intake or output data in the 24 hours ending 03/11/23 1751       Vitals:   Vitals:    03/11/23 0945   BP: (!) 154/92   Pulse: 57   Resp: 18   Temp: 98.1 °F (36.7 °C)   SpO2: 94%       Physical Exam:   General: NAD, sitting in bed  Eyes: no discharge  HENT: NCAT  Cardiovascular: RRR  Respiratory: cta b/l bs+  Gastrointestinal: Soft, non tender, nondistended  Genitourinary: no suprapubic tenderness  Musculoskeletal: No edema, nontender  Skin: has dressings on both arms and wounds on both legs with dressings  Neuro: Alert. No gross deficits  Psych: Mood appropriate. Medications:   Medications:    silver sulfADIAZINE   Topical Daily    linezolid  600 mg Oral 2 times per day    sodium chloride flush  5-40 mL IntraVENous 2 times per day    enoxaparin  40 mg SubCUTAneous Daily    cefTRIAXone (ROCEPHIN) IV  1,000 mg IntraVENous Q24H    nicotine  1 patch TransDERmal Daily      Infusions:    sodium chloride Stopped (03/07/23 1559)    lactated ringers IV soln 1,000 mL (03/10/23 1625)     PRN Meds: [START ON 3/12/2023] LORazepam, 1 mg, Once PRN  LORazepam, 1 mg, PRN  promethazine, 6.25 mg, Q6H PRN  oxyCODONE-acetaminophen, 1 tablet, Q4H PRN  sodium chloride flush, 5-40 mL, PRN  sodium chloride, , PRN  ondansetron, 4 mg, Q8H PRN   Or  ondansetron, 4 mg, Q6H PRN  polyethylene glycol, 17 g, Daily PRN  acetaminophen, 650 mg, Q6H PRN   Or  acetaminophen, 650 mg, Q6H PRN  ketorolac, 15 mg, Q6H PRN  droperidol, 0.625 mg, Q10 Min PRN      Labs      No results found for this or any previous visit (from the past 24 hour(s)).        Imaging/Diagnostics Last 24 Hours   XR CHEST (2 VW)    Result Date: 3/7/2023  EXAMINATION: TWO XRAY VIEWS OF THE CHEST 3/7/2023 8:05 am COMPARISON: None. HISTORY: ORDERING SYSTEM PROVIDED HISTORY: abnormal breath sounds on right lung TECHNOLOGIST PROVIDED HISTORY: Reason for exam:->abnormal breath sounds on right lung Reason for Exam: abnormal breath sounds on right lung FINDINGS: Cardiac silhouette appears unremarkable. Consolidation at the right lung base which appears to involve the right middle and lower lobes suggesting pneumonia. No pleural effusion. No pneumothorax. Left lung is clear. Postoperative changes of cholecystectomy. Osseous structures appear intact. 1. Consolidation at the right lung base which appears to involve both the right middle and lower lobes. Findings most suggestive of pneumonia.        Electronically signed by Ashley Pinto MD on 3/11/2023 at 5:51 PM

## 2023-03-11 NOTE — PROGRESS NOTES
GENERAL SURGERY PROGRESS NOTE    Rg Renteria is a 44 y.o. female status post I&D of abscesses of all extremities and right hand. Subjective:  No acute events overnight    Objective:    Vitals: VITALS:  BP (!) 147/77   Pulse 78   Temp 97.9 °F (36.6 °C) (Oral)   Resp 18   Ht 5' 1\" (1.549 m)   Wt 170 lb 14.4 oz (77.5 kg)   SpO2 93%   BMI 32.29 kg/m²     I/O: 03/10 0701 - 03/11 0700  In: 1543.6 [P.O.:500; I.V.:1009.6]  Out: -     Labs/Imaging Results:   Lab Results   Component Value Date/Time     03/10/2023 12:59 AM    K 4.2 03/10/2023 12:59 AM     03/10/2023 12:59 AM    CO2 24 03/10/2023 12:59 AM    BUN 13 03/10/2023 12:59 AM    CREATININE 0.7 03/10/2023 12:59 AM    GLUCOSE 84 03/10/2023 12:59 AM    CALCIUM 8.0 03/10/2023 12:59 AM      Lab Results   Component Value Date    WBC 5.0 03/10/2023    HGB 10.9 (L) 03/10/2023    HCT 35.0 (L) 03/10/2023    MCV 91.4 03/10/2023     03/10/2023          IV Fluids:   sodium chloride Last Rate: Stopped (03/07/23 1559)    lactated ringers IV soln Last Rate: 1,000 mL (03/10/23 1625)    Scheduled Meds:   LORazepam, 1 mg, IntraVENous, Once    silver sulfADIAZINE, , Topical, Daily    linezolid, 600 mg, Oral, 2 times per day    sodium chloride flush, 5-40 mL, IntraVENous, 2 times per day    enoxaparin, 40 mg, SubCUTAneous, Daily    cefTRIAXone (ROCEPHIN) IV, 1,000 mg, IntraVENous, Q24H    nicotine, 1 patch, TransDERmal, Daily    Physical Exam:  General: A&O x 3, no distress. HEENT: Anicteric sclerae, MMM. Extremities: No edema bilat LE. Abdomen: Soft, nondistended, nontender     Wounds all with clean wound bases with minimal drainage. Assessment and Plan:     Patient Active Problem List:     Cellulitis     Abscess     Infection due to Streptococcus pyogenes     Heroin use     Crack cocaine use      Principal Problem:    Cellulitis  Plan:      Active Problems:    Abscess  Plan:       Infection due to Streptococcus pyogenes  Plan: Heroin use  Plan:       Crack cocaine use      Abscesses/wounds related to IVDA. Wound care- left medial leg cluster/rt medial leg cleanse with NS fill with  Aquacel Ag abd kerlix change daily and prn. When removing dressing soak with NS. Rt thumb/lt thumb/cleanse with NS apply Silvadene cream cover with  optifoam border change daily and prn. Rt arm/rt hand/lt arm cleanse with NS fill with Aquacel Ag cover with optifoam border change daily and prn. Pt is generally not at risk for skin breakdown AEB zulema.         Continue dressing changes per wound care instructions as above  Patient to follow-up in wound care after discharge         Jeanie Favorite, DO

## 2023-03-12 ENCOUNTER — APPOINTMENT (OUTPATIENT)
Dept: MRI IMAGING | Age: 43
DRG: 364 | End: 2023-03-12
Attending: INTERNAL MEDICINE
Payer: MEDICAID

## 2023-03-12 LAB
AFB SMEAR: NORMAL
AFB SMEAR: NORMAL
CULTURE: ABNORMAL
GRAM SMEAR: ABNORMAL
HAV AB SER QL IA: NEGATIVE
HBV CORE AB SERPL QL IA: POSITIVE
Lab: ABNORMAL
Lab: NORMAL
Lab: NORMAL
SPECIMEN: ABNORMAL
SPECIMEN: NORMAL
SPECIMEN: NORMAL

## 2023-03-12 PROCEDURE — 6370000000 HC RX 637 (ALT 250 FOR IP): Performed by: NURSE PRACTITIONER

## 2023-03-12 PROCEDURE — 94761 N-INVAS EAR/PLS OXIMETRY MLT: CPT

## 2023-03-12 PROCEDURE — 6370000000 HC RX 637 (ALT 250 FOR IP): Performed by: STUDENT IN AN ORGANIZED HEALTH CARE EDUCATION/TRAINING PROGRAM

## 2023-03-12 PROCEDURE — 1200000000 HC SEMI PRIVATE

## 2023-03-12 PROCEDURE — 6360000002 HC RX W HCPCS: Performed by: HOSPITALIST

## 2023-03-12 PROCEDURE — 6370000000 HC RX 637 (ALT 250 FOR IP): Performed by: HOSPITALIST

## 2023-03-12 PROCEDURE — 6360000002 HC RX W HCPCS: Performed by: INTERNAL MEDICINE

## 2023-03-12 PROCEDURE — 6360000004 HC RX CONTRAST MEDICATION: Performed by: NURSE PRACTITIONER

## 2023-03-12 PROCEDURE — 72158 MRI LUMBAR SPINE W/O & W/DYE: CPT

## 2023-03-12 PROCEDURE — 72156 MRI NECK SPINE W/O & W/DYE: CPT

## 2023-03-12 PROCEDURE — 2580000003 HC RX 258: Performed by: INTERNAL MEDICINE

## 2023-03-12 PROCEDURE — A9579 GAD-BASE MR CONTRAST NOS,1ML: HCPCS | Performed by: NURSE PRACTITIONER

## 2023-03-12 PROCEDURE — 72157 MRI CHEST SPINE W/O & W/DYE: CPT

## 2023-03-12 RX ORDER — KETOROLAC TROMETHAMINE 30 MG/ML
15 INJECTION, SOLUTION INTRAMUSCULAR; INTRAVENOUS 3 TIMES DAILY PRN
Status: DISCONTINUED | OUTPATIENT
Start: 2023-03-12 | End: 2023-03-13 | Stop reason: HOSPADM

## 2023-03-12 RX ORDER — IBUPROFEN 600 MG/1
600 TABLET ORAL EVERY 6 HOURS PRN
Status: DISCONTINUED | OUTPATIENT
Start: 2023-03-12 | End: 2023-03-12

## 2023-03-12 RX ADMIN — OXYCODONE AND ACETAMINOPHEN 1 TABLET: 5; 325 TABLET ORAL at 12:58

## 2023-03-12 RX ADMIN — OXYCODONE AND ACETAMINOPHEN 1 TABLET: 5; 325 TABLET ORAL at 23:33

## 2023-03-12 RX ADMIN — CEFTRIAXONE SODIUM 1000 MG: 1 INJECTION, POWDER, FOR SOLUTION INTRAMUSCULAR; INTRAVENOUS at 04:31

## 2023-03-12 RX ADMIN — GADOTERIDOL 15 ML: 279.3 INJECTION, SOLUTION INTRAVENOUS at 09:48

## 2023-03-12 RX ADMIN — LORAZEPAM 1 MG: 2 INJECTION INTRAMUSCULAR; INTRAVENOUS at 08:13

## 2023-03-12 RX ADMIN — KETOROLAC TROMETHAMINE 15 MG: 30 INJECTION, SOLUTION INTRAMUSCULAR at 20:47

## 2023-03-12 RX ADMIN — ENOXAPARIN SODIUM 40 MG: 100 INJECTION SUBCUTANEOUS at 08:15

## 2023-03-12 RX ADMIN — OXYCODONE AND ACETAMINOPHEN 1 TABLET: 5; 325 TABLET ORAL at 17:06

## 2023-03-12 RX ADMIN — OXYCODONE AND ACETAMINOPHEN 1 TABLET: 5; 325 TABLET ORAL at 04:29

## 2023-03-12 RX ADMIN — IBUPROFEN 600 MG: 600 TABLET, FILM COATED ORAL at 08:14

## 2023-03-12 RX ADMIN — SODIUM CHLORIDE, PRESERVATIVE FREE 10 ML: 5 INJECTION INTRAVENOUS at 08:16

## 2023-03-12 RX ADMIN — LINEZOLID 600 MG: 600 TABLET, FILM COATED ORAL at 20:38

## 2023-03-12 RX ADMIN — SILVER SULFADIAZINE: 10 CREAM TOPICAL at 19:01

## 2023-03-12 RX ADMIN — LINEZOLID 600 MG: 600 TABLET, FILM COATED ORAL at 08:14

## 2023-03-12 ASSESSMENT — PAIN SCALES - WONG BAKER: WONGBAKER_NUMERICALRESPONSE: 0

## 2023-03-12 ASSESSMENT — PAIN SCALES - GENERAL
PAINLEVEL_OUTOF10: 3
PAINLEVEL_OUTOF10: 0
PAINLEVEL_OUTOF10: 7
PAINLEVEL_OUTOF10: 6
PAINLEVEL_OUTOF10: 0
PAINLEVEL_OUTOF10: 7
PAINLEVEL_OUTOF10: 0
PAINLEVEL_OUTOF10: 9

## 2023-03-12 ASSESSMENT — PAIN DESCRIPTION - ORIENTATION
ORIENTATION: MID
ORIENTATION: RIGHT;LEFT
ORIENTATION: RIGHT

## 2023-03-12 ASSESSMENT — PAIN - FUNCTIONAL ASSESSMENT
PAIN_FUNCTIONAL_ASSESSMENT: PREVENTS OR INTERFERES SOME ACTIVE ACTIVITIES AND ADLS
PAIN_FUNCTIONAL_ASSESSMENT: PREVENTS OR INTERFERES SOME ACTIVE ACTIVITIES AND ADLS

## 2023-03-12 ASSESSMENT — PAIN DESCRIPTION - LOCATION
LOCATION: GENERALIZED
LOCATION: ARM
LOCATION: GENERALIZED
LOCATION: ARM;LEG
LOCATION: GENERALIZED
LOCATION: LEG

## 2023-03-12 ASSESSMENT — PAIN DESCRIPTION - DESCRIPTORS
DESCRIPTORS: DULL;DISCOMFORT
DESCRIPTORS: ACHING;GNAWING
DESCRIPTORS: ACHING
DESCRIPTORS: ACHING;TENDER
DESCRIPTORS: ACHING
DESCRIPTORS: DISCOMFORT;TENDER

## 2023-03-12 ASSESSMENT — PAIN DESCRIPTION - PAIN TYPE: TYPE: ACUTE PAIN

## 2023-03-12 ASSESSMENT — PAIN DESCRIPTION - FREQUENCY: FREQUENCY: INTERMITTENT

## 2023-03-12 NOTE — PROGRESS NOTES
V2.0  [facility] [place] Hospitalist Progress Note      Name:  Jesus Shown /Age/Sex: 1980  (44 y.o. female)   MRN & CSN:  [id number] & [id number] Encounter Date/Time: 3/12/2023 9:37 AM EST    Location:  [address] [place] [address] PCP: No primary care provider on file. Hospital Day: 6    Assessment and Plan:   Jesus Shown is a 44 y.o. female with pmh of polysubstance use who presents with Cellulitis      Plan:  #Abscesses and purulent cellulitis of BLE and BUE  -has multiple areas of induration on UEs and LEs  -Abscesses primarily at the site of IV drug injection  -s/p I&D of upper and lower extremity abscesses 3/7  - Off vancomycin since cannot monitor Vanco levels, start p.o. linezolid. Continue Rocephin. Surgical culture 3/7: Strep pyogenes. Echo: EF 55-60%, no mention of vegetations. Wound care following.  - 3/10: CRP 32.     - 3/12: MRI spine completed today. [facility] Cee #Possible R-sided pneumonia  -patient with coughing with sputum production  -dx with pneumonia at [facility] ED  -Urine Legionella and strep negative. Procalcitonin 0.513. Rule out TB with sputum AFB x3 and QuantiFERON test.  -3/11: MRSA nares negative. - 3/12: On room air. AFB Cx: Negative x3. Nurse to check with infection control if patient can be taken out of airborne isolation. #Polysubstance use disorder  -uses tobacco, crack cocaine, IV heroin, marijuana  -Last reported drug use 1 week ago  - on stopping  - UDS: Cocaine. Hepatitis C antibody positive  -3/11: Check hepatitis C quantitative PCR. Diet ADULT DIET; Regular  ADULT ORAL NUTRITION SUPPLEMENT; Breakfast, Dinner; Wound Healing Oral Supplement  ADULT ORAL NUTRITION SUPPLEMENT; Lunch, Dinner;  Low Calorie/High Protein Oral Supplement   DVT Prophylaxis [x] Lovenox, []  Heparin, [] SCDs, [] Ambulation,  [] Eliquis, [] Xarelto  [] Coumadin   Code Status Full Code   Disposition From: Home  Expected Disposition: Home  Estimated Date of Discharge: 3/14  Patient requires continued admission due to PNA and cellulitis   Surrogate Decision Maker/ POA      Subjective:     Chief Complaint: No chief complaint on file. Patient completed her MRI spine this morning. She states she has chronic back pain. She is wondering if she can have her Toradol restarted. She is making urine. Review of Systems:      ROS negative except for as above. Objective:   No intake or output data in the 24 hours ending 03/12/23 0954       Vitals:   Vitals:    03/12/23 0459   BP:    Pulse:    Resp: 14   Temp:    SpO2:        Physical Exam:   General: NAD, sitting in bed  Eyes: no discharge  HENT: NCAT  Cardiovascular: RRR  Respiratory: cta b/l bs+  Gastrointestinal: Soft, non tender, nondistended  Genitourinary: no suprapubic tenderness  Musculoskeletal: No edema, no tenderness in spine  Skin: has dressings on both arms and wounds on both legs with dressings  Neuro: Alert. No gross deficits  Psych: Mood appropriate. Medications:   Medications:    silver sulfADIAZINE   Topical Daily    linezolid  600 mg Oral 2 times per day    sodium chloride flush  5-40 mL IntraVENous 2 times per day    enoxaparin  40 mg SubCUTAneous Daily    cefTRIAXone (ROCEPHIN) IV  1,000 mg IntraVENous Q24H    nicotine  1 patch TransDERmal Daily      Infusions:    sodium chloride Stopped (03/07/23 1559)    lactated ringers IV soln 1,000 mL (03/11/23 1815)     PRN Meds: ibuprofen, 600 mg, Q6H PRN  LORazepam, 1 mg, Once PRN  promethazine, 6.25 mg, Q6H PRN  oxyCODONE-acetaminophen, 1 tablet, Q4H PRN  sodium chloride flush, 5-40 mL, PRN  sodium chloride, , PRN  ondansetron, 4 mg, Q8H PRN   Or  ondansetron, 4 mg, Q6H PRN  polyethylene glycol, 17 g, Daily PRN  acetaminophen, 650 mg, Q6H PRN   Or  acetaminophen, 650 mg, Q6H PRN  droperidol, 0.625 mg, Q10 Min PRN      Labs      No results found for this or any previous visit (from the past 24 hour(s)).        Imaging/Diagnostics Last 24 Hours   XR CHEST (2 VW)    Result Date: 3/7/2023  EXAMINATION: TWO XRAY VIEWS OF THE CHEST 3/7/2023 8:05 am COMPARISON: None. HISTORY: ORDERING SYSTEM PROVIDED HISTORY: abnormal breath sounds on right lung TECHNOLOGIST PROVIDED HISTORY: Reason for exam:->abnormal breath sounds on right lung Reason for Exam: abnormal breath sounds on right lung FINDINGS: Cardiac silhouette appears unremarkable. Consolidation at the right lung base which appears to involve the right middle and lower lobes suggesting pneumonia. No pleural effusion. No pneumothorax. Left lung is clear. Postoperative changes of cholecystectomy. Osseous structures appear intact. 1. Consolidation at the right lung base which appears to involve both the right middle and lower lobes. Findings most suggestive of pneumonia.        Electronically signed by Nate Castañeda MD on 3/12/2023 at 9:54 AM

## 2023-03-13 VITALS
HEART RATE: 60 BPM | OXYGEN SATURATION: 98 % | DIASTOLIC BLOOD PRESSURE: 80 MMHG | RESPIRATION RATE: 16 BRPM | TEMPERATURE: 98.6 F | SYSTOLIC BLOOD PRESSURE: 118 MMHG | WEIGHT: 170.9 LBS | BODY MASS INDEX: 32.27 KG/M2 | HEIGHT: 61 IN

## 2023-03-13 PROCEDURE — 6360000002 HC RX W HCPCS: Performed by: INTERNAL MEDICINE

## 2023-03-13 PROCEDURE — 94761 N-INVAS EAR/PLS OXIMETRY MLT: CPT

## 2023-03-13 PROCEDURE — 6370000000 HC RX 637 (ALT 250 FOR IP): Performed by: STUDENT IN AN ORGANIZED HEALTH CARE EDUCATION/TRAINING PROGRAM

## 2023-03-13 PROCEDURE — 6360000002 HC RX W HCPCS: Performed by: HOSPITALIST

## 2023-03-13 PROCEDURE — 2580000003 HC RX 258: Performed by: INTERNAL MEDICINE

## 2023-03-13 PROCEDURE — 99232 SBSQ HOSP IP/OBS MODERATE 35: CPT | Performed by: NURSE PRACTITIONER

## 2023-03-13 PROCEDURE — 6370000000 HC RX 637 (ALT 250 FOR IP): Performed by: NURSE PRACTITIONER

## 2023-03-13 RX ORDER — LEVOFLOXACIN 750 MG/1
750 TABLET ORAL DAILY
Qty: 8 TABLET | Refills: 0 | Status: SHIPPED | OUTPATIENT
Start: 2023-03-14 | End: 2023-03-22

## 2023-03-13 RX ORDER — NICOTINE 21 MG/24HR
1 PATCH, TRANSDERMAL 24 HOURS TRANSDERMAL DAILY
Qty: 30 PATCH | Refills: 0 | Status: SHIPPED | OUTPATIENT
Start: 2023-03-14

## 2023-03-13 RX ORDER — LEVOFLOXACIN 500 MG/1
750 TABLET, FILM COATED ORAL DAILY
Status: DISCONTINUED | OUTPATIENT
Start: 2023-03-13 | End: 2023-03-13 | Stop reason: HOSPADM

## 2023-03-13 RX ADMIN — CEFTRIAXONE SODIUM 1000 MG: 1 INJECTION, POWDER, FOR SOLUTION INTRAMUSCULAR; INTRAVENOUS at 05:31

## 2023-03-13 RX ADMIN — SILVER SULFADIAZINE: 10 CREAM TOPICAL at 10:39

## 2023-03-13 RX ADMIN — SODIUM CHLORIDE, PRESERVATIVE FREE 10 ML: 5 INJECTION INTRAVENOUS at 11:00

## 2023-03-13 RX ADMIN — ENOXAPARIN SODIUM 40 MG: 100 INJECTION SUBCUTANEOUS at 10:37

## 2023-03-13 RX ADMIN — LEVOFLOXACIN 750 MG: 500 TABLET, FILM COATED ORAL at 12:51

## 2023-03-13 RX ADMIN — KETOROLAC TROMETHAMINE 15 MG: 30 INJECTION, SOLUTION INTRAMUSCULAR at 05:31

## 2023-03-13 RX ADMIN — OXYCODONE AND ACETAMINOPHEN 1 TABLET: 5; 325 TABLET ORAL at 10:37

## 2023-03-13 RX ADMIN — LINEZOLID 600 MG: 600 TABLET, FILM COATED ORAL at 10:37

## 2023-03-13 ASSESSMENT — PAIN SCALES - GENERAL
PAINLEVEL_OUTOF10: 3
PAINLEVEL_OUTOF10: 3
PAINLEVEL_OUTOF10: 7
PAINLEVEL_OUTOF10: 0
PAINLEVEL_OUTOF10: 0

## 2023-03-13 ASSESSMENT — PAIN DESCRIPTION - DESCRIPTORS: DESCRIPTORS: ACHING

## 2023-03-13 ASSESSMENT — PAIN - FUNCTIONAL ASSESSMENT: PAIN_FUNCTIONAL_ASSESSMENT: PREVENTS OR INTERFERES SOME ACTIVE ACTIVITIES AND ADLS

## 2023-03-13 ASSESSMENT — PAIN DESCRIPTION - LOCATION: LOCATION: ARM;LEG

## 2023-03-13 NOTE — DISCHARGE INSTRUCTIONS
Medications: see computerized discharge medication list  Activity: activity as tolerated  Diet: regular diet   Wound Care:  Wound care- left medial leg cluster/rt medial leg cleanse with NS fill with  Aquacel Ag abd kerlix change daily and prn. When removing dressing soak with NS. Rt thumb/lt thumb/cleanse with NS apply Silvadene cream cover with  optifoam border change daily and prn. Rt arm/rt hand/lt arm cleanse with NS fill with Aquacel Ag cover with optifoam border change daily and prn. Disposition: home  Discharged Condition: Stable    - You will need to follow-up with the wound care center to help manage your wounds.

## 2023-03-13 NOTE — PROGRESS NOTES
V2.0  [facility] [place] Hospitalist Progress Note      Name:  Rolf Padgett /Age/Sex: 1980  (44 y.o. female)   MRN & CSN:  [id number] & [id number] Encounter Date/Time: 3/13/2023 9:37 AM EST    Location[place]  [address] [place] [address] PCP: No primary care provider on file. Hospital Day: 7    Assessment and Plan:   Rolf Padgett is a 44 y.o. female with pmh of polysubstance use who presents with Cellulitis      Plan:  #Abscesses and purulent cellulitis of BLE and BUE  -has multiple areas of induration on UEs and LEs  -Abscesses primarily at the site of IV drug injection  -s/p I&D of upper and lower extremity abscesses 3/7  - Off vancomycin since cannot monitor Vanco levels, start p.o. linezolid. Continue Rocephin. Surgical culture 3/7: Strep pyogenes. Echo: EF 55-60%, no mention of vegetations. Wound care following.  - 3/10: CRP 32.     - 3/12: MRI spine completed today. Jet Wilburn #Possible R-sided pneumonia  -patient with coughing with sputum production  -dx with pneumonia at [facility] ED  -Urine Legionella and strep negative. Procalcitonin 0.513. Rule out TB with sputum AFB x3 and QuantiFERON test.  -3/11: MRSA nares negative. - 3/12: On room air. AFB Cx: Negative x3. Nurse to check with infection control if patient can be taken out of airborne isolation. #Polysubstance use disorder  -uses tobacco, crack cocaine, IV heroin, marijuana  -Last reported drug use 1 week ago  - on stopping  - UDS: Cocaine. Hepatitis C antibody positive  -3/11: Check hepatitis C quantitative PCR. Diet ADULT DIET; Regular  ADULT ORAL NUTRITION SUPPLEMENT; Breakfast, Dinner; Wound Healing Oral Supplement  ADULT ORAL NUTRITION SUPPLEMENT; Lunch, Dinner;  Low Calorie/High Protein Oral Supplement   DVT Prophylaxis [x] Lovenox, []  Heparin, [] SCDs, [] Ambulation,  [] Eliquis, [] Xarelto  [] Coumadin   Code Status Full Code   Disposition From: Home  Expected Disposition: Home  Estimated Date of Discharge: 3/14  Patient requires continued admission due to PNA and cellulitis   Surrogate Decision Maker/ POA      Subjective:     Chief Complaint: No chief complaint on file. Patient has no complaints. She is just wanting to go home. Review of Systems:      ROS negative except for as above. Objective: Intake/Output Summary (Last 24 hours) at 3/13/2023 1332  Last data filed at 3/13/2023 2096  Gross per 24 hour   Intake 991.32 ml   Output --   Net 991.32 ml          Vitals:   Vitals:    03/13/23 0830   BP:    Pulse: 60   Resp: 16   Temp: 98.6 °F (37 °C)   SpO2: 98%       Physical Exam:   General: NAD, laying in bed  Eyes: no discharge  HENT: NCAT  Cardiovascular: RRR  Respiratory: cta b/l bs+  Gastrointestinal: Soft, non tender, nondistended  Genitourinary: no suprapubic tenderness  Musculoskeletal: No edema, nontender  Skin: has dressings on both arms and wounds on both legs with dressings  Neuro: Alert. No gross deficits  Psych: Mood appropriate. Medications:   Medications:    levoFLOXacin  750 mg Oral Daily    silver sulfADIAZINE   Topical Daily    sodium chloride flush  5-40 mL IntraVENous 2 times per day    enoxaparin  40 mg SubCUTAneous Daily    nicotine  1 patch TransDERmal Daily      Infusions:    sodium chloride Stopped (03/07/23 1559)    lactated ringers IV soln Stopped (03/12/23 0600)     PRN Meds: ketorolac, 15 mg, TID PRN  promethazine, 6.25 mg, Q6H PRN  oxyCODONE-acetaminophen, 1 tablet, Q4H PRN  sodium chloride flush, 5-40 mL, PRN  sodium chloride, , PRN  ondansetron, 4 mg, Q8H PRN   Or  ondansetron, 4 mg, Q6H PRN  polyethylene glycol, 17 g, Daily PRN  acetaminophen, 650 mg, Q6H PRN   Or  acetaminophen, 650 mg, Q6H PRN  droperidol, 0.625 mg, Q10 Min PRN      Labs      No results found for this or any previous visit (from the past 24 hour(s)). Imaging/Diagnostics Last 24 Hours   XR CHEST (2 VW)    Result Date: 3/7/2023  EXAMINATION: TWO XRAY VIEWS OF THE CHEST 3/7/2023 8:05 am COMPARISON: None.  HISTORY: ORDERING SYSTEM PROVIDED HISTORY: abnormal breath sounds on right lung TECHNOLOGIST PROVIDED HISTORY: Reason for exam:->abnormal breath sounds on right lung Reason for Exam: abnormal breath sounds on right lung FINDINGS: Cardiac silhouette appears unremarkable. Consolidation at the right lung base which appears to involve the right middle and lower lobes suggesting pneumonia. No pleural effusion. No pneumothorax. Left lung is clear. Postoperative changes of cholecystectomy. Osseous structures appear intact. 1. Consolidation at the right lung base which appears to involve both the right middle and lower lobes. Findings most suggestive of pneumonia.        Electronically signed by Nahomi Zhao MD on 3/13/2023 at 1:32 PM

## 2023-03-13 NOTE — PLAN OF CARE
Problem: Discharge Planning  Goal: Discharge to home or other facility with appropriate resources  Outcome: Progressing  Flowsheets (Taken 3/12/2023 2040)  Discharge to home or other facility with appropriate resources: Identify barriers to discharge with patient and caregiver     Problem: Pain  Goal: Verbalizes/displays adequate comfort level or baseline comfort level  Outcome: Progressing  Flowsheets (Taken 3/12/2023 2039)  Verbalizes/displays adequate comfort level or baseline comfort level: Assess pain using appropriate pain scale     Problem: Safety - Adult  Goal: Free from fall injury  Outcome: Progressing     Problem: Skin/Tissue Integrity  Goal: Absence of new skin breakdown  Description: 1. Monitor for areas of redness and/or skin breakdown  2. Assess vascular access sites hourly  3. Every 4-6 hours minimum:  Change oxygen saturation probe site  4. Every 4-6 hours:  If on nasal continuous positive airway pressure, respiratory therapy assess nares and determine need for appliance change or resting period.   Outcome: Progressing

## 2023-03-13 NOTE — PROGRESS NOTES
Charge nurse called RN to alert pt wanting to leave AMA, provider updated. Pt upset that Nursing Director came in room early in morning talking about her families actions while at the facility. 1045: Wound care done per wound care orders  1545: Pt d/c, instructions reviewed, meds, reviewed, f/u reviewed, pt verbalized understanding has no questions at this time. Pt walked out by RN no s/s of distress at time of discharge.

## 2023-03-13 NOTE — PROGRESS NOTES
Infectious Disease Progress Note  3/13/2023   Patient Name: Shanthi Strong : 1980   Impression  Sepsis Secondary to:   Multiple Abscess on Extremities Secondary to IVDU:  Pneumonia of the Right Lung with Resolved Hemoptysis:  Mid to Low Back Pain, Concern for OM/ Discitis/ Abscess:  Past Endocarditis:  Chronic Hepatitis C: RNA Pending  Afebrile, no leukocytosis  CrCl 99  No reported allergies to ABX  Pct 0.513, CRP 32.9  3/7-BC Pending  3/8-BC Pending  3/8-MRSA/MSSA screen negative  3/9, 3/10, 3/11/2023-AFB x 3 negative  3/7-CXR 2 View: Consolidation at the right lung base which appears to involve both the right middle and lower lobes. Findings most suggestive of pneumonia. 3/7-S/p Dr. Katharine Erickson: Upper and lower extremities I&D. DX: multiple abscesses of all extremities. Surgical cultures: Streptococcus pyogenes  3/8-Echo complete: EF 55-60%, no mention of IE  IVDU of Heroin/ Crack Cocaine:              Reports has used x 18 years (since age 26)  Marijuana Use/ Tobacco Abuse:  Homelessness:  Obesity:  BMI 32.29  Multi-morbidity: per PMHx     Plan:  DC IV ceftriaxone, linezolid and Flagyl  Start po levofloxacin 750 mg daily x 14 cumulative days (end date 3/21/2023)  Trend CRP and Pct, ordered  Follow up with GI for Hepatitis C treatment as OP  OK from ID Standpoint to DC when ready, as AFB x 3 are negative. Ongoing Antimicrobial Therapy  Linezolid 3/10-? Completed Antimicrobial Therapy  Vancomycin 3/7-10? Ceftriaxone 3/7-13  Flagyl 3/8-13  History:? Interval history noted. Chief complaint: sepsis secondary to Streptococcus pyogenes infection, abscess on all extremities. RLL pneumonia.    Denies n/v/d/f or untoward effects of antibiotics  Physical Exam:  Vital Signs: /80   Pulse 60   Temp 98.6 °F (37 °C) (Oral)   Resp 16   Ht 5' 1\" (1.549 m)   Wt 170 lb 14.4 oz (77.5 kg)   SpO2 98%   BMI 32.29 kg/m²     Gen: A&O x 4, no distress  Wounds: C/D/I multiple dressings intact on bilateral hands, arms, and lower legs  Chest: no distress and CTA. Posterior breath sounds with scattered rhonchi to diminished bases. Room air. Occasional productive cough. Tenderness to light palpation mid to low back. Heart: NSR and no MRG. Abd: soft, non-distended, no tenderness, no hepatomegaly. Normoactive bowel sounds. Ext: no clubbing, cyanosis, or edema  Neuro: Mental status intact. CN 2-12 intact and no focal sensory or motor deficits     Radiologic / Imaging / TESTING  3/7/2023 XR Chest (2 VW):  Impression   1. Consolidation at the right lung base which appears to involve both the   right middle and lower lobes. Findings most suggestive of pneumonia. 3/8/2023 Complete Echo:   Summary   Left ventricular systolic function is normal.   Ejection fraction is visually estimated at 55-60%. Mild to moderate tricuspid regurgitation; RVSP: 44.22mmHg; mild PHTN. No evidence of any pericardial effusion. 3/12/2023 MRI Cervical Spine W WO Contrast:  Impression   1. Normal contrast-enhanced MRI evaluation of the cervical spine. No   evidence of discitis-osteomyelitis. 2. Incidentally noted low-lying cerebellar tonsils. 3/12/2023 MRI Lumbar Spine W WO Contrast:  Impression   Normal contrast-enhanced MRI evaluation of the lumbar spine. No convincing   evidence of discitis-osteomyelitis. 3/12/2023 MRI Thoracic Spine W WO Contrast:  Impression   Within limitations, normal contrast-enhanced MRI evaluation of the thoracic   spine. No convincing evidence of discitis-osteomyelitis. Labs:    No results found for this or any previous visit (from the past 24 hour(s)).     CULTURE results: Invalid input(s): BLOOD CULTURE,  URINE CULTURE, SURGICAL CULTURE    Diagnosis:  Patient Active Problem List   Diagnosis    Cellulitis    Abscess    Infection due to Streptococcus pyogenes    Heroin use    Crack cocaine use    Abscess of upper extremity    Abscess of lower extremity       Active Problems  Principal Problem: Cellulitis  Active Problems:    Abscess    Infection due to Streptococcus pyogenes    Heroin use    Crack cocaine use    Abscess of upper extremity    Abscess of lower extremity  Resolved Problems:    * No resolved hospital problems. *    Electronically signed by: Electronically signed by Rishabh Vernon.  ERMY Perry CNP on 3/13/2023 at 10:30 AM

## 2023-03-13 NOTE — DISCHARGE SUMMARY
V2.0  Discharge Summary    Name:  Shanthi Strong /Age/Sex: 1980 (44 y.o. female)   Admit Date: 3/7/2023  Discharge Date: 3/13/23    MRN & CSN:  [id number] & [id number] Encounter Date and Time 3/13/23 1:46 PM EDT    Attending:  Dewitt Essex, MD Discharging Provider: Dewitt Essex, MD       Hospital Course:     HPI:   Chief Complaint: Cellulitis  Shanthi Strong is a 44 y.o. female with pmh of polysubstance use who presents with multiple abscesses on BLE and BUE. Patient is an IV drug user which is the origin of most of the abscesses. Patient also endorses having a productive cough and states that they diagnosed her with pneumonia at [facility] ED as well. She endorses chest pain associated with coughing, and having a mild sore throat. Patient is a 2-3 PPD smoker since age 16. She uses heroin and crack cocaine and rarely uses marijuana. She denies fever, chills, nausea, vomiting, abd pain, changes in urination. Problem Based Course:   Patient had abscesses and purulent cellulitis of bilateral lower and upper extremities. She had multiple areas of induration on upper and lower extremities. Abscesses were primarily at the sites of IV drug injections. She was placed on IV antibiotics. Surgical cultures from 3/7 grew strep pyogenes. Echo showed an EF of 55-60% and no mention of any vegetations. Patient had an MRI of the spine which was unremarkable. Wound care was following. General surgery recommended outpatient follow-up at the wound care center. ID recommended levofloxacin 750 mg daily until 3/21. Patient had possible right-sided pneumonia. She had been diagnosed with pneumonia at the [facility] ED. Urine Legionella and strep were negative. Procalcitonin was 0.513. She was checked for TB. She had 3 AFB cultures which were negative x3. QuantiFERON test was still pending at the time of discharge. MRSA nares was negative. Patient was on room air.   Patient was discharged on levofloxacin 70 mg daily until 3/21. Patient has history of polysubstance use disorder. She is a tobacco, crack cocaine, IV heroin, and marijuana. Last reported drug use was 1 week ago. Her urine drug screen was positive for cocaine. Patient was hep C antibody positive. She is also hep B core antibody positive. She will need follow-up with GI as an outpatient for hepatitis C treatment. Hepatitis C quantitive PCR was still pending at the time of discharge. Patient was stable. She was discharged to home. Consults this admission:  IP CONSULT TO GENERAL SURGERY  IP CONSULT TO IV TEAM  IP CONSULT TO INFECTIOUS DISEASES    Discharge Diagnosis:     #Abscesses and purulent cellulitis of BLE and BUE  -has multiple areas of induration on UEs and LEs  -Abscesses primarily at the site of IV drug injection  -s/p I&D of upper and lower extremity abscesses 3/7  - Off vancomycin since cannot monitor Vanco levels, start p.o. linezolid. Continue Rocephin. Surgical culture 3/7: Strep pyogenes. Echo: EF 55-60%, no mention of vegetations. Wound care following.  - 3/10: CRP 32.     - 3/12: MRI spine completed today. Shan Fishman #Possible R-sided pneumonia  -patient with coughing with sputum production  -dx with pneumonia at [facility] ED  -Urine Legionella and strep negative. Procalcitonin 0.513. Rule out TB with sputum AFB x3 and QuantiFERON test.  -3/11: MRSA nares negative. - 3/12: On room air. AFB Cx: Negative x3. Nurse to check with infection control if patient can be taken out of airborne isolation. #Polysubstance use disorder  -uses tobacco, crack cocaine, IV heroin, marijuana  -Last reported drug use 1 week ago  - on stopping  - UDS: Cocaine. Hepatitis C antibody positive  -3/11: Check hepatitis C quantitative PCR.        Discharge Instruction:   Medications: see computerized discharge medication list  Activity: activity as tolerated  Diet: regular diet   Wound Care:  Wound care- left medial leg cluster/rt medial leg cleanse with NS fill with  Aquacel Ag abd kerlix change daily and prn. When removing dressing soak with NS. Rt thumb/lt thumb/cleanse with NS apply Silvadene cream cover with  optifoam border change daily and prn. Rt arm/rt hand/lt arm cleanse with NS fill with Aquacel Ag cover with optifoam border change daily and prn. Disposition: home  Discharged Condition: Stable    - You will need to follow-up with the wound care center to help manage your wounds. Discharge Medications:        Medication List        START taking these medications      levoFLOXacin 750 MG tablet  Commonly known as: LEVAQUIN  Take 1 tablet by mouth daily for 8 doses  Start taking on: March 14, 2023     nicotine 21 MG/24HR  Commonly known as: [address] [facility] 1 patch onto the skin daily  Start taking on: March 14, 2023     silver sulfADIAZINE 1 % cream  Commonly known as: SILVADENE  Apply topically daily Apply topically daily.  Apply to rt and lt thumb with dressing changes            CONTINUE taking these medications      cloNIDine 0.1 MG tablet  Commonly known as: CATAPRES     FLUoxetine 20 MG capsule  Commonly known as: PROZAC     hydrOXYzine pamoate 25 MG capsule  Commonly known as: VISTARIL     mirtazapine 30 MG tablet  Commonly known as: REMERON               Where to Get Your Medications        These medications were sent to [address] [place] [address], [address] [place] [address]      Phone: [phone]   levoFLOXacin 750 MG tablet  nicotine 21 MG/24HR  silver sulfADIAZINE 1 % cream        Objective Findings at Discharge:   /80   Pulse 60   Temp 98.6 °F (37 °C) (Oral)   Resp 16   Ht 5' 1\" (1.549 m)   Wt 170 lb 14.4 oz (77.5 kg)   SpO2 98%   BMI 32.29 kg/m²       Physical Exam:   General: NAD, laying in bed  Eyes: no discharge  HENT: NCAT  Cardiovascular: RRR  Respiratory: cta b/l bs+  Gastrointestinal: Soft, non tender, nondistended  Genitourinary: no suprapubic tenderness  Musculoskeletal: No edema, nontender  Skin: has dressings on both arms and wounds on both legs with dressings  Neuro: Alert. No gross deficits  Psych: Mood appropriate. Imaging   XR CHEST (2 VW)    Result Date: 3/7/2023  EXAMINATION: TWO XRAY VIEWS OF THE CHEST 3/7/2023 8:05 am COMPARISON: None. HISTORY: ORDERING SYSTEM PROVIDED HISTORY: abnormal breath sounds on right lung TECHNOLOGIST PROVIDED HISTORY: Reason for exam:->abnormal breath sounds on right lung Reason for Exam: abnormal breath sounds on right lung FINDINGS: Cardiac silhouette appears unremarkable. Consolidation at the right lung base which appears to involve the right middle and lower lobes suggesting pneumonia. No pleural effusion. No pneumothorax. Left lung is clear. Postoperative changes of cholecystectomy. Osseous structures appear intact. 1. Consolidation at the right lung base which appears to involve both the right middle and lower lobes. Findings most suggestive of pneumonia. MRI CERVICAL SPINE W WO CONTRAST    Result Date: 3/12/2023  EXAMINATION: MRI OF THE CERVICAL SPINE WITHOUT AND WITH CONTRAST  3/12/2023 9:44 am: TECHNIQUE: Multiplanar multisequence MRI of the cervical spine was performed without and with the administration of intravenous contrast. COMPARISON: None. HISTORY: ORDERING SYSTEM PROVIDED HISTORY: see lumbar spine order TECHNOLOGIST PROVIDED HISTORY: Reason for exam:->see lumbar spine order What is the sedation requirement?->None Reason for Exam: evaluate for OM/ discitis/ abscess in the setting of IVDU Relevant Medical/Surgical History: 15mL prohance; GFR >60 FINDINGS: BONES/ALIGNMENT: Straightening of the cervical spine. No significant spondylolisthesis. Vertebral body heights appear preserved. Marrow signal appears within normal limits. No evidence of acute fracture, erosive changes, or aggressive appearing osseous lesions.   No abnormal T2 hyperintense intervertebral disc signal. SPINAL CORD: No abnormal cord signal is seen. Incidentally noted low-lying cerebellar tonsils. SOFT TISSUES: No abnormal enhancement of the cervical spine. No paraspinal mass or inflammatory changes identified. C2-C3: No significant spinal canal stenosis or foraminal narrowing. C3-C4: No significant spinal canal stenosis or foraminal narrowing. C4-C5: No significant spinal canal stenosis or foraminal narrowing. C5-C6: No significant spinal canal stenosis or foraminal narrowing. C6-C7: No significant spinal canal stenosis or foraminal narrowing. C7-T1: No significant spinal canal stenosis or foraminal narrowing. 1. Normal contrast-enhanced MRI evaluation of the cervical spine. No evidence of discitis-osteomyelitis. 2. Incidentally noted low-lying cerebellar tonsils. MRI THORACIC SPINE W WO CONTRAST    Result Date: 3/12/2023  EXAMINATION: MRI OF THE THORACIC SPINE WITHOUT AND WITH CONTRAST  3/12/2023 9:47 am TECHNIQUE: Multiplanar multisequence MRI of the thoracic spine was performed without and with the administration of intravenous contrast. COMPARISON: None HISTORY: ORDERING SYSTEM PROVIDED HISTORY: see lumbar spine order TECHNOLOGIST PROVIDED HISTORY: Reason for exam:->see lumbar spine order What is the sedation requirement?->None Reason for Exam: evaluate for OM/ discitis/ abscess in the setting of IVDU Relevant Medical/Surgical History: 15mL prohance; GFR >60 FINDINGS: Please note, artifact present within the upper thoracic levels on the sagittal pre and postcontrast T1 sequences. BONES/ALIGNMENT: No significant spondylolisthesis. Vertebral body heights appear preserved. Marrow signal appears within normal limits. No evidence of acute fracture, erosive changes, or aggressive appearing osseous lesions. No abnormal T2 hyperintense intervertebral disc signal. SPINAL CORD: No abnormal cord signal is seen.  SOFT TISSUES:  Within limitations, no abnormal enhancement of the thoracic spine. No paraspinal mass or inflammatory changes identified. Incidental left renal cyst. DEGENERATIVE CHANGES: No significant spinal canal stenosis or neural foraminal narrowing of the thoracic spine. Within limitations, normal contrast-enhanced MRI evaluation of the thoracic spine. No convincing evidence of discitis-osteomyelitis. MRI LUMBAR SPINE W WO CONTRAST    Result Date: 3/12/2023  EXAMINATION: MRI OF THE LUMBAR SPINE WITHOUT AND WITH CONTRAST  3/12/2023 9:45 am TECHNIQUE: Multiplanar multisequence MRI of the lumbar spine was performed without and with the administration of intravenous contrast. COMPARISON: None. HISTORY: ORDERING SYSTEM PROVIDED HISTORY: evaluate for OM/ discitis/ abscess in the setting of IVDU TECHNOLOGIST PROVIDED HISTORY: Reason for exam:->evaluate for OM/ discitis/ abscess in the setting of IVDU What is the sedation requirement?->None Reason for Exam: evaluate for OM/ discitis/ abscess in the setting of IVDU Relevant Medical/Surgical History: 15mL prohance; GFR >60 FINDINGS: BONES/ALIGNMENT: No significant spondylolisthesis. Vertebral body heights appear preserved. Marrow signal appears within normal limits. No evidence of acute fracture, erosive changes, or aggressive appearing osseous lesions. No abnormal T2 hyperintense intervertebral disc signal. SPINAL CORD:  The conus terminates normally. SOFT TISSUES: No abnormal enhancement is seen of the lumbar spine. No paraspinal mass or inflammatory changes identified. L1-L2: No significant spinal canal stenosis or foraminal narrowing. L2-L3: No significant spinal canal stenosis or foraminal narrowing. L3-L4: No significant spinal canal stenosis or foraminal narrowing. L4-L5: No significant spinal canal stenosis or foraminal narrowing. L5-S1: No significant spinal canal stenosis or foraminal narrowing. Normal contrast-enhanced MRI evaluation of the lumbar spine.   No convincing evidence of discitis-osteomyelitis.        Time Spent Discharging patient 34 minutes    Electronically signed by Joyce Polanco MD on 3/13/2023 at 1:46 PM

## 2023-03-14 NOTE — PROGRESS NOTES
Outpatient Pharmacy Progress Note for Meds-to-Beds    Total number of Prescriptions Filled: 3  The following medications were dispensed to the patient during the discharge process:  Levofloxacin  Silver sulfadiazine cream  Nicotine patches    Additional Documentation:  Medication(s) were delivered to the patient's room prior to discharge      Thank you for letting us serve your patients.   [address]    [address], [address] [facility]    Phone: [phone]    Fax: [phone]

## 2023-03-21 LAB
AFB SMEAR: NORMAL
CULTURE: NORMAL
Lab: NORMAL
SPECIMEN: NORMAL

## 2023-03-21 NOTE — PROGRESS NOTES
Physician Progress Note      PATIENT:               Grupo Valles  CSN #:                  [id number]  :                       1980  ADMIT DATE:       3/7/2023 4:16 AM  DISCH DATE:        3/13/2023 1:44 PM  RESPONDING  PROVIDER #:        Chitra Montana MD          QUERY TEXT:    Pt admitted with cellulitis of bilateral LE. Noted documentation of sepsis on   3/8/23 by Dr. Suzanna Amador, [facility] consultant. If possible, please document in   progress notes and discharge summary:    The medical record reflects the following:  Risk Factors: cellulitis  Clinical Indicators: Consult note dated 3/8 by Dr. Abeba Clemons states, \"Sepsis   secondary to Streptococcus pyogenes infection. \"  Initial VS: afebrile, P   56-65, RR 12-18, SBP , SpO2 95-98 RA. Pt had some hypotension on 3/8   with SBP readings of 84, 86, and 92. Hypotension resolved 3/9. No other change   in VS noted. LABS: WBC 5.0 on 3/10, no further readings. Procal 0.513. No   lactate was ordered. Wound cultures from left leg and right hand were positive   for Strep pyogenes. Treatment: Rocephin IV, Clinda IV, Linezolid PO, Flagyl PO, Vanco IV, ID   consult, imaging, labs, gen surg consult    Thank you,  Jethro Morrison RN  [phone]  Options provided:  -- Sepsis confirmed present on admission  -- Sepsis confirmed not present on admission  -- Sepsis ruled out  -- Other - I will add my own diagnosis  -- Disagree - Not applicable / Not valid  -- Disagree - Clinically unable to determine / Unknown  -- Refer to Clinical Documentation Reviewer    PROVIDER RESPONSE TEXT:    The diagnosis of sepsis was ruled out.     Query created by: Grabiel Sharp on 3/15/2023 10:00 AM      Electronically signed by:  Chitra Montana MD 3/21/2023 7:57 AM

## 2023-03-28 LAB
AFB SMEAR: NORMAL
CULTURE: NORMAL
Lab: NORMAL
SPECIMEN: NORMAL

## 2023-04-04 ENCOUNTER — HOSPITAL ENCOUNTER (OUTPATIENT)
Dept: WOUND CARE | Age: 43
Discharge: HOME | End: 2023-04-04
Payer: MEDICAID

## 2023-04-04 VITALS
TEMPERATURE: 97.8 F | HEART RATE: 82 BPM | RESPIRATION RATE: 16 BRPM | DIASTOLIC BLOOD PRESSURE: 69 MMHG | SYSTOLIC BLOOD PRESSURE: 123 MMHG

## 2023-04-04 DIAGNOSIS — T81.89XD NON-HEALING SURGICAL WOUND, SUBSEQUENT ENCOUNTER: Primary | ICD-10-CM

## 2023-04-04 PROBLEM — T81.89XA SURGICAL WOUND, NON HEALING: Status: ACTIVE | Noted: 2023-04-04

## 2023-04-04 LAB
AFB SMEAR: NORMAL
CULTURE: NORMAL
Lab: NORMAL
SPECIMEN: NORMAL

## 2023-04-04 PROCEDURE — 11042 DBRDMT SUBQ TIS 1ST 20SQCM/<: CPT

## 2023-04-04 PROCEDURE — 11042 DBRDMT SUBQ TIS 1ST 20SQCM/<: CPT | Performed by: SURGERY

## 2023-04-04 PROCEDURE — 99213 OFFICE O/P EST LOW 20 MIN: CPT

## 2023-04-04 ASSESSMENT — PAIN DESCRIPTION - DESCRIPTORS: DESCRIPTORS: SORE

## 2023-04-04 ASSESSMENT — PAIN - FUNCTIONAL ASSESSMENT: PAIN_FUNCTIONAL_ASSESSMENT: PREVENTS OR INTERFERES SOME ACTIVE ACTIVITIES AND ADLS

## 2023-04-04 ASSESSMENT — PAIN DESCRIPTION - PAIN TYPE: TYPE: CHRONIC PAIN

## 2023-04-04 ASSESSMENT — PAIN DESCRIPTION - FREQUENCY: FREQUENCY: INTERMITTENT

## 2023-04-04 ASSESSMENT — PAIN DESCRIPTION - ONSET: ONSET: ON-GOING

## 2023-04-04 ASSESSMENT — PAIN DESCRIPTION - LOCATION: LOCATION: ARM;LEG

## 2023-04-04 ASSESSMENT — PAIN SCALES - GENERAL: PAINLEVEL_OUTOF10: 8

## 2023-04-04 ASSESSMENT — PAIN DESCRIPTION - ORIENTATION: ORIENTATION: RIGHT;LEFT

## 2023-04-04 NOTE — PROGRESS NOTES
Wound Care Center Progress Note With Procedure    Jesus Shown  AGE: 44 y.o. GENDER: female  : 1980  EPISODE DATE:  2023     Subjective:     Chief Complaint   Patient presents with    Wound Check         HISTORY of PRESENT ILLNESS      Jesus Shown is a 44 y.o. female who presents today for wound evaluation of Acute non-healing surgical ulcer(s) of all extremities. The ulcer is of moderate severity. The underlying cause of the wound is abscesses 2/2 IVDA. S/p debridement on recent admission. Missed follow up aptts and this is first f/u appt since discharge.    Wound Pain Timing/Severity: constant  Quality of pain: sharp  Severity of pain:  4 / 10   Modifying Factors: smoking  Associated Signs/Symptoms: drainage and pain        PAST MEDICAL HISTORY        Diagnosis Date    Hep C w/o coma, chronic (HCC)     Hepatitis C antibody positive in blood 2023    Heroin dependence ([facility] [address]     History of smoking at least 2 packs per day for at least 15 years     MRSA infection        PAST SURGICAL HISTORY    Past Surgical History:   Procedure Laterality Date    CHOLECYSTECTOMY      CYST INCISION AND DRAINAGE      numerous places 6 different areas    LEG SURGERY Bilateral 3/7/2023    UPPER AND LOWER EXTREMEITIES DEBRIDEMENT INCISION AND DRAINAGE performed by Nichole Luna DO at [place] [address]  02/27/15    I&D of R shoulder AC joint    SHOULDER SURGERY Right     TOE SURGERY      were amputated in accident and sewn back on    TONSILLECTOMY         FAMILY HISTORY    Family History   Problem Relation Age of Onset    Heart Disease Mother     Asthma Mother     Cancer Maternal Grandmother     Heart Disease Maternal Grandmother     Asthma Maternal Grandmother        SOCIAL HISTORY    Social History     Tobacco Use    Smoking status: Every Day     Packs/day: 1.00     Types: Cigarettes    Smokeless tobacco: Never   Vaping Use    Vaping Use: Never used   Substance Use Topics    Alcohol use:

## 2023-04-04 NOTE — DISCHARGE INSTRUCTIONS
PHYSICIAN ORDERS AND DISCHARGE INSTRUCTIONS    NOTE: Upon discharge from the [address], you will receive a patient experience survey. We would be grateful if you would take the time to fill this survey out. Wound care order history:     NASH's   Right       Left    Date:   Cultures:     Grafts:     Antibiotics:        Continuing wound care orders and information:                Residence:                Continue home health care with:    Your wound-care supplies will be provided by: Wound cleansing:     Do not scrub or use excessive force. Wash hands with soap and water before and after dressing changes. Prior to applying a clean dressing, cleanse wound with normal saline, wound cleanser, or mild soap and water. Ask the physician or nurse before getting the wound(s) wet in a shower. General Wound management:   Keep weight off wounds and reposition every 2 hours. Avoid standing for long periods of time. Apply wraps/stockings in AM and remove at bedtime. If swelling is present, elevate legs to the level of the heart or above for 30 minutes 4-5 times a day and/or when sitting. When taking antibiotics take entire prescription as ordered by physician do not stop taking until medicine is all gone. Orders for this week: 4/4/23    Rx:    Right and Left Medial Knees, Left Medial Lower Leg and Left Forearm Wounds - Wash with soap and water, pat dry. Apply Gentamicin and Santyl to wound beds. Cover with ca alginate and silicone borders. Change daily. Follow up with Dr Katharine Erickson in 1 week(s) in the wound care center. Call [phone] for any questions or concerns.   Date__________   Time____________

## 2023-04-05 ASSESSMENT — ENCOUNTER SYMPTOMS
ABDOMINAL DISTENTION: 0
NAUSEA: 0
COLOR CHANGE: 1
ABDOMINAL PAIN: 0
COUGH: 0
SHORTNESS OF BREATH: 0
STRIDOR: 0
VOMITING: 0
BLOOD IN STOOL: 0
TROUBLE SWALLOWING: 0
SORE THROAT: 0
BACK PAIN: 0
CHOKING: 0

## 2023-04-18 LAB
AFB SMEAR: NORMAL
CULTURE: NORMAL
Lab: NORMAL
SPECIMEN: NORMAL

## 2023-04-25 LAB
AFB SMEAR: NORMAL
CULTURE: NORMAL
Lab: NORMAL
SPECIMEN: NORMAL

## 2023-05-17 ENCOUNTER — FOLLOWUP TELEPHONE ENCOUNTER (OUTPATIENT)
Dept: WOUND CARE | Age: 43
End: 2023-05-17

## 2023-07-17 ENCOUNTER — HOSPITAL ENCOUNTER (INPATIENT)
Age: 43
LOS: 3 days | Discharge: HOME | DRG: 364 | End: 2023-07-21
Attending: EMERGENCY MEDICINE | Admitting: STUDENT IN AN ORGANIZED HEALTH CARE EDUCATION/TRAINING PROGRAM
Payer: MEDICAID

## 2023-07-17 DIAGNOSIS — F19.10 IV DRUG ABUSE (HCC): ICD-10-CM

## 2023-07-17 DIAGNOSIS — L02.416 CELLULITIS AND ABSCESS OF LEFT LOWER EXTREMITY: ICD-10-CM

## 2023-07-17 DIAGNOSIS — L03.116 BILATERAL LOWER LEG CELLULITIS: ICD-10-CM

## 2023-07-17 DIAGNOSIS — L03.116 CELLULITIS AND ABSCESS OF LEFT LOWER EXTREMITY: ICD-10-CM

## 2023-07-17 DIAGNOSIS — T81.89XA NON-HEALING SURGICAL WOUND, INITIAL ENCOUNTER: ICD-10-CM

## 2023-07-17 DIAGNOSIS — L03.115 BILATERAL LOWER LEG CELLULITIS: ICD-10-CM

## 2023-07-17 DIAGNOSIS — M65.9 TENOSYNOVITIS OF EXTENSOR DIGITORUM LONGUS TENDON: Primary | ICD-10-CM

## 2023-07-17 DIAGNOSIS — L02.419 ABSCESS OF LOWER EXTREMITY: ICD-10-CM

## 2023-07-17 DIAGNOSIS — B99.9 INFECTION: ICD-10-CM

## 2023-07-17 DIAGNOSIS — L02.415 ABSCESS OF RIGHT LOWER EXTREMITY: ICD-10-CM

## 2023-07-17 DIAGNOSIS — L02.612 ABSCESS OF LEFT FOOT: ICD-10-CM

## 2023-07-17 PROCEDURE — 96376 TX/PRO/DX INJ SAME DRUG ADON: CPT

## 2023-07-17 PROCEDURE — 99285 EMERGENCY DEPT VISIT HI MDM: CPT

## 2023-07-17 PROCEDURE — 96367 TX/PROPH/DG ADDL SEQ IV INF: CPT

## 2023-07-17 PROCEDURE — 96361 HYDRATE IV INFUSION ADD-ON: CPT

## 2023-07-17 PROCEDURE — 96375 TX/PRO/DX INJ NEW DRUG ADDON: CPT

## 2023-07-17 PROCEDURE — 96365 THER/PROPH/DIAG IV INF INIT: CPT

## 2023-07-17 ASSESSMENT — LIFESTYLE VARIABLES
HOW OFTEN DO YOU HAVE A DRINK CONTAINING ALCOHOL: NEVER
HOW MANY STANDARD DRINKS CONTAINING ALCOHOL DO YOU HAVE ON A TYPICAL DAY: PATIENT DOES NOT DRINK
HOW MANY STANDARD DRINKS CONTAINING ALCOHOL DO YOU HAVE ON A TYPICAL DAY: PATIENT DOES NOT DRINK
HOW OFTEN DO YOU HAVE A DRINK CONTAINING ALCOHOL: NEVER

## 2023-07-18 ENCOUNTER — ANESTHESIA EVENT (OUTPATIENT)
Dept: OPERATING ROOM | Age: 43
End: 2023-07-18
Payer: MEDICAID

## 2023-07-18 ENCOUNTER — ANESTHESIA (OUTPATIENT)
Dept: OPERATING ROOM | Age: 43
End: 2023-07-18
Payer: MEDICAID

## 2023-07-18 ENCOUNTER — APPOINTMENT (OUTPATIENT)
Dept: CT IMAGING | Age: 43
DRG: 364 | End: 2023-07-18
Payer: MEDICAID

## 2023-07-18 PROBLEM — L03.116 CELLULITIS OF LEFT LEG: Status: ACTIVE | Noted: 2023-07-18

## 2023-07-18 LAB
ALBUMIN SERPL-MCNC: 3.6 GM/DL (ref 3.4–5)
ALP BLD-CCNC: 68 IU/L (ref 40–129)
ALT SERPL-CCNC: 6 U/L (ref 10–40)
ANION GAP SERPL CALCULATED.3IONS-SCNC: 11 MMOL/L (ref 4–16)
AST SERPL-CCNC: 11 IU/L (ref 15–37)
BASOPHILS ABSOLUTE: 0 K/CU MM
BASOPHILS RELATIVE PERCENT: 0.2 % (ref 0–1)
BILIRUB SERPL-MCNC: 0.8 MG/DL (ref 0–1)
BUN SERPL-MCNC: 15 MG/DL (ref 6–23)
CALCIUM SERPL-MCNC: 8.8 MG/DL (ref 8.3–10.6)
CHLORIDE BLD-SCNC: 97 MMOL/L (ref 99–110)
CHP ED QC CHECK: YES
CO2: 23 MMOL/L (ref 21–32)
CREAT SERPL-MCNC: 0.8 MG/DL (ref 0.6–1.1)
DIFFERENTIAL TYPE: ABNORMAL
EKG ATRIAL RATE: 72 BPM
EKG DIAGNOSIS: NORMAL
EKG P AXIS: 65 DEGREES
EKG P-R INTERVAL: 146 MS
EKG Q-T INTERVAL: 422 MS
EKG QRS DURATION: 78 MS
EKG QTC CALCULATION (BAZETT): 462 MS
EKG R AXIS: 58 DEGREES
EKG T AXIS: 37 DEGREES
EKG VENTRICULAR RATE: 72 BPM
EOSINOPHILS ABSOLUTE: 0 K/CU MM
EOSINOPHILS RELATIVE PERCENT: 0.1 % (ref 0–3)
GFR SERPL CREATININE-BSD FRML MDRD: >60 ML/MIN/1.73M2
GLUCOSE BLD-MCNC: 125 MG/DL
GLUCOSE BLD-MCNC: 125 MG/DL (ref 70–99)
GLUCOSE SERPL-MCNC: 112 MG/DL (ref 70–99)
HCT VFR BLD CALC: 32.7 % (ref 37–47)
HEMOGLOBIN: 10.6 GM/DL (ref 12.5–16)
IMMATURE NEUTROPHIL %: 0.6 % (ref 0–0.43)
LACTIC ACID, SEPSIS: 1.2 MMOL/L (ref 0.5–1.9)
LYMPHOCYTES ABSOLUTE: 1.3 K/CU MM
LYMPHOCYTES RELATIVE PERCENT: 7.2 % (ref 24–44)
MAGNESIUM: 1.9 MG/DL (ref 1.8–2.4)
MCH RBC QN AUTO: 28.5 PG (ref 27–31)
MCHC RBC AUTO-ENTMCNC: 32.4 % (ref 32–36)
MCV RBC AUTO: 87.9 FL (ref 78–100)
MONOCYTES ABSOLUTE: 0.9 K/CU MM
MONOCYTES RELATIVE PERCENT: 4.7 % (ref 0–4)
NUCLEATED RBC %: 0 %
PDW BLD-RTO: 12.4 % (ref 11.7–14.9)
PLATELET # BLD: 213 K/CU MM (ref 140–440)
PMV BLD AUTO: 9.9 FL (ref 7.5–11.1)
POTASSIUM SERPL-SCNC: 3.9 MMOL/L (ref 3.5–5.1)
RBC # BLD: 3.72 M/CU MM (ref 4.2–5.4)
SEGMENTED NEUTROPHILS ABSOLUTE COUNT: 15.6 K/CU MM
SEGMENTED NEUTROPHILS RELATIVE PERCENT: 87.2 % (ref 36–66)
SODIUM BLD-SCNC: 131 MMOL/L (ref 135–145)
TOTAL IMMATURE NEUTOROPHIL: 0.1 K/CU MM
TOTAL NUCLEATED RBC: 0 K/CU MM
TOTAL PROTEIN: 7.2 GM/DL (ref 6.4–8.2)
WBC # BLD: 17.9 K/CU MM (ref 4–10.5)

## 2023-07-18 PROCEDURE — 6360000002 HC RX W HCPCS

## 2023-07-18 PROCEDURE — 7100000000 HC PACU RECOVERY - FIRST 15 MIN: Performed by: SURGERY

## 2023-07-18 PROCEDURE — 1200000000 HC SEMI PRIVATE

## 2023-07-18 PROCEDURE — 93010 ELECTROCARDIOGRAM REPORT: CPT | Performed by: INTERNAL MEDICINE

## 2023-07-18 PROCEDURE — 0J9R0ZZ DRAINAGE OF LEFT FOOT SUBCUTANEOUS TISSUE AND FASCIA, OPEN APPROACH: ICD-10-PCS | Performed by: SURGERY

## 2023-07-18 PROCEDURE — 99222 1ST HOSP IP/OBS MODERATE 55: CPT | Performed by: SURGERY

## 2023-07-18 PROCEDURE — 2580000003 HC RX 258: Performed by: EMERGENCY MEDICINE

## 2023-07-18 PROCEDURE — 3700000001 HC ADD 15 MINUTES (ANESTHESIA): Performed by: SURGERY

## 2023-07-18 PROCEDURE — 83735 ASSAY OF MAGNESIUM: CPT

## 2023-07-18 PROCEDURE — 87077 CULTURE AEROBIC IDENTIFY: CPT

## 2023-07-18 PROCEDURE — 3600000012 HC SURGERY LEVEL 2 ADDTL 15MIN: Performed by: SURGERY

## 2023-07-18 PROCEDURE — 80053 COMPREHEN METABOLIC PANEL: CPT

## 2023-07-18 PROCEDURE — 83605 ASSAY OF LACTIC ACID: CPT

## 2023-07-18 PROCEDURE — 2580000003 HC RX 258: Performed by: SURGERY

## 2023-07-18 PROCEDURE — 87075 CULTR BACTERIA EXCEPT BLOOD: CPT

## 2023-07-18 PROCEDURE — 87205 SMEAR GRAM STAIN: CPT

## 2023-07-18 PROCEDURE — 93005 ELECTROCARDIOGRAM TRACING: CPT | Performed by: EMERGENCY MEDICINE

## 2023-07-18 PROCEDURE — 6360000002 HC RX W HCPCS: Performed by: ANESTHESIOLOGY

## 2023-07-18 PROCEDURE — 3600000002 HC SURGERY LEVEL 2 BASE: Performed by: SURGERY

## 2023-07-18 PROCEDURE — 3700000000 HC ANESTHESIA ATTENDED CARE: Performed by: SURGERY

## 2023-07-18 PROCEDURE — 6360000002 HC RX W HCPCS: Performed by: SURGERY

## 2023-07-18 PROCEDURE — 7100000001 HC PACU RECOVERY - ADDTL 15 MIN: Performed by: SURGERY

## 2023-07-18 PROCEDURE — 87186 SC STD MICRODIL/AGAR DIL: CPT

## 2023-07-18 PROCEDURE — 82962 GLUCOSE BLOOD TEST: CPT

## 2023-07-18 PROCEDURE — 2500000003 HC RX 250 WO HCPCS: Performed by: SURGERY

## 2023-07-18 PROCEDURE — 6360000002 HC RX W HCPCS: Performed by: NURSE PRACTITIONER

## 2023-07-18 PROCEDURE — 87076 CULTURE ANAEROBE IDENT EACH: CPT

## 2023-07-18 PROCEDURE — 6370000000 HC RX 637 (ALT 250 FOR IP): Performed by: SURGERY

## 2023-07-18 PROCEDURE — 0J9N0ZZ DRAINAGE OF RIGHT LOWER LEG SUBCUTANEOUS TISSUE AND FASCIA, OPEN APPROACH: ICD-10-PCS | Performed by: SURGERY

## 2023-07-18 PROCEDURE — 73701 CT LOWER EXTREMITY W/DYE: CPT

## 2023-07-18 PROCEDURE — 2709999900 HC NON-CHARGEABLE SUPPLY: Performed by: SURGERY

## 2023-07-18 PROCEDURE — 87070 CULTURE OTHR SPECIMN AEROBIC: CPT

## 2023-07-18 PROCEDURE — 6360000002 HC RX W HCPCS: Performed by: EMERGENCY MEDICINE

## 2023-07-18 PROCEDURE — 10061 I&D ABSCESS COMP/MULTIPLE: CPT | Performed by: SURGERY

## 2023-07-18 PROCEDURE — 2580000003 HC RX 258

## 2023-07-18 PROCEDURE — 85025 COMPLETE CBC W/AUTO DIFF WBC: CPT

## 2023-07-18 PROCEDURE — 87040 BLOOD CULTURE FOR BACTERIA: CPT

## 2023-07-18 PROCEDURE — 2500000003 HC RX 250 WO HCPCS

## 2023-07-18 PROCEDURE — 6360000004 HC RX CONTRAST MEDICATION: Performed by: EMERGENCY MEDICINE

## 2023-07-18 PROCEDURE — 2580000003 HC RX 258: Performed by: NURSE PRACTITIONER

## 2023-07-18 PROCEDURE — 6370000000 HC RX 637 (ALT 250 FOR IP): Performed by: NURSE PRACTITIONER

## 2023-07-18 RX ORDER — METHADONE HYDROCHLORIDE 10 MG/1
10 TABLET ORAL EVERY 4 HOURS PRN
Status: DISCONTINUED | OUTPATIENT
Start: 2023-07-18 | End: 2023-07-21 | Stop reason: HOSPADM

## 2023-07-18 RX ORDER — OXYCODONE HYDROCHLORIDE 5 MG/1
5 TABLET ORAL
Status: DISCONTINUED | OUTPATIENT
Start: 2023-07-18 | End: 2023-07-18 | Stop reason: HOSPADM

## 2023-07-18 RX ORDER — PROPOFOL 10 MG/ML
INJECTION, EMULSION INTRAVENOUS PRN
Status: DISCONTINUED | OUTPATIENT
Start: 2023-07-18 | End: 2023-07-18 | Stop reason: SDUPTHER

## 2023-07-18 RX ORDER — MEPERIDINE HYDROCHLORIDE 25 MG/ML
12.5 INJECTION INTRAMUSCULAR; INTRAVENOUS; SUBCUTANEOUS EVERY 5 MIN PRN
Status: DISCONTINUED | OUTPATIENT
Start: 2023-07-18 | End: 2023-07-18 | Stop reason: HOSPADM

## 2023-07-18 RX ORDER — LIDOCAINE HYDROCHLORIDE 20 MG/ML
INJECTION, SOLUTION INFILTRATION; PERINEURAL PRN
Status: DISCONTINUED | OUTPATIENT
Start: 2023-07-18 | End: 2023-07-18 | Stop reason: SDUPTHER

## 2023-07-18 RX ORDER — ACETAMINOPHEN 325 MG/1
650 TABLET ORAL EVERY 6 HOURS PRN
Status: DISCONTINUED | OUTPATIENT
Start: 2023-07-18 | End: 2023-07-21 | Stop reason: HOSPADM

## 2023-07-18 RX ORDER — MORPHINE SULFATE 2 MG/ML
2 INJECTION, SOLUTION INTRAMUSCULAR; INTRAVENOUS EVERY 4 HOURS PRN
Status: DISCONTINUED | OUTPATIENT
Start: 2023-07-18 | End: 2023-07-20

## 2023-07-18 RX ORDER — POLYETHYLENE GLYCOL 3350 17 G/17G
17 POWDER, FOR SOLUTION ORAL DAILY PRN
Status: DISCONTINUED | OUTPATIENT
Start: 2023-07-18 | End: 2023-07-21 | Stop reason: HOSPADM

## 2023-07-18 RX ORDER — OXYCODONE HYDROCHLORIDE 5 MG/1
10 TABLET ORAL PRN
Status: DISCONTINUED | OUTPATIENT
Start: 2023-07-18 | End: 2023-07-18 | Stop reason: HOSPADM

## 2023-07-18 RX ORDER — METRONIDAZOLE 500 MG/100ML
500 INJECTION, SOLUTION INTRAVENOUS
Status: COMPLETED | OUTPATIENT
Start: 2023-07-18 | End: 2023-07-18

## 2023-07-18 RX ORDER — 0.9 % SODIUM CHLORIDE 0.9 %
1000 INTRAVENOUS SOLUTION INTRAVENOUS ONCE
Status: COMPLETED | OUTPATIENT
Start: 2023-07-18 | End: 2023-07-18

## 2023-07-18 RX ORDER — PROCHLORPERAZINE EDISYLATE 5 MG/ML
5 INJECTION INTRAMUSCULAR; INTRAVENOUS
Status: DISCONTINUED | OUTPATIENT
Start: 2023-07-18 | End: 2023-07-18 | Stop reason: HOSPADM

## 2023-07-18 RX ORDER — KETOROLAC TROMETHAMINE 30 MG/ML
INJECTION, SOLUTION INTRAMUSCULAR; INTRAVENOUS PRN
Status: DISCONTINUED | OUTPATIENT
Start: 2023-07-18 | End: 2023-07-18 | Stop reason: SDUPTHER

## 2023-07-18 RX ORDER — DICYCLOMINE HCL 20 MG
20 TABLET ORAL EVERY 6 HOURS PRN
Status: DISCONTINUED | OUTPATIENT
Start: 2023-07-18 | End: 2023-07-21 | Stop reason: HOSPADM

## 2023-07-18 RX ORDER — OXYCODONE HYDROCHLORIDE AND ACETAMINOPHEN 5; 325 MG/1; MG/1
1 TABLET ORAL EVERY 4 HOURS PRN
Status: DISCONTINUED | OUTPATIENT
Start: 2023-07-18 | End: 2023-07-21 | Stop reason: HOSPADM

## 2023-07-18 RX ORDER — ONDANSETRON 4 MG/1
4 TABLET, ORALLY DISINTEGRATING ORAL EVERY 8 HOURS PRN
Status: DISCONTINUED | OUTPATIENT
Start: 2023-07-18 | End: 2023-07-21 | Stop reason: HOSPADM

## 2023-07-18 RX ORDER — IPRATROPIUM BROMIDE AND ALBUTEROL SULFATE 2.5; .5 MG/3ML; MG/3ML
1 SOLUTION RESPIRATORY (INHALATION)
Status: DISCONTINUED | OUTPATIENT
Start: 2023-07-18 | End: 2023-07-18 | Stop reason: HOSPADM

## 2023-07-18 RX ORDER — LIDOCAINE HYDROCHLORIDE 10 MG/ML
INJECTION, SOLUTION INFILTRATION; PERINEURAL
Status: COMPLETED | OUTPATIENT
Start: 2023-07-18 | End: 2023-07-18

## 2023-07-18 RX ORDER — MORPHINE SULFATE 4 MG/ML
4 INJECTION, SOLUTION INTRAMUSCULAR; INTRAVENOUS EVERY 4 HOURS PRN
Status: DISCONTINUED | OUTPATIENT
Start: 2023-07-18 | End: 2023-07-18

## 2023-07-18 RX ORDER — KETOROLAC TROMETHAMINE 15 MG/ML
15 INJECTION, SOLUTION INTRAMUSCULAR; INTRAVENOUS ONCE
Status: COMPLETED | OUTPATIENT
Start: 2023-07-18 | End: 2023-07-18

## 2023-07-18 RX ORDER — SODIUM CHLORIDE 0.9 % (FLUSH) 0.9 %
5-40 SYRINGE (ML) INJECTION PRN
Status: DISCONTINUED | OUTPATIENT
Start: 2023-07-18 | End: 2023-07-18 | Stop reason: HOSPADM

## 2023-07-18 RX ORDER — MORPHINE SULFATE 4 MG/ML
6 INJECTION, SOLUTION INTRAMUSCULAR; INTRAVENOUS EVERY 30 MIN PRN
Status: DISCONTINUED | OUTPATIENT
Start: 2023-07-17 | End: 2023-07-18

## 2023-07-18 RX ORDER — MIDAZOLAM HYDROCHLORIDE 1 MG/ML
INJECTION INTRAMUSCULAR; INTRAVENOUS PRN
Status: DISCONTINUED | OUTPATIENT
Start: 2023-07-18 | End: 2023-07-18 | Stop reason: SDUPTHER

## 2023-07-18 RX ORDER — LOPERAMIDE HYDROCHLORIDE 2 MG/1
2 CAPSULE ORAL 4 TIMES DAILY PRN
Status: DISCONTINUED | OUTPATIENT
Start: 2023-07-18 | End: 2023-07-21 | Stop reason: HOSPADM

## 2023-07-18 RX ORDER — BUPRENORPHINE 2 MG/1
4 TABLET SUBLINGUAL PRN
Status: DISCONTINUED | OUTPATIENT
Start: 2023-07-18 | End: 2023-07-18 | Stop reason: ALTCHOICE

## 2023-07-18 RX ORDER — ONDANSETRON 2 MG/ML
INJECTION INTRAMUSCULAR; INTRAVENOUS PRN
Status: DISCONTINUED | OUTPATIENT
Start: 2023-07-18 | End: 2023-07-18 | Stop reason: SDUPTHER

## 2023-07-18 RX ORDER — TRAZODONE HYDROCHLORIDE 50 MG/1
50 TABLET ORAL NIGHTLY PRN
Status: DISCONTINUED | OUTPATIENT
Start: 2023-07-18 | End: 2023-07-21 | Stop reason: HOSPADM

## 2023-07-18 RX ORDER — SODIUM CHLORIDE 0.9 % (FLUSH) 0.9 %
5-40 SYRINGE (ML) INJECTION EVERY 12 HOURS SCHEDULED
Status: DISCONTINUED | OUTPATIENT
Start: 2023-07-18 | End: 2023-07-21 | Stop reason: HOSPADM

## 2023-07-18 RX ORDER — ACETAMINOPHEN 650 MG/1
650 SUPPOSITORY RECTAL EVERY 6 HOURS PRN
Status: DISCONTINUED | OUTPATIENT
Start: 2023-07-18 | End: 2023-07-21 | Stop reason: HOSPADM

## 2023-07-18 RX ORDER — SODIUM CHLORIDE 9 MG/ML
INJECTION, SOLUTION INTRAVENOUS CONTINUOUS
Status: DISCONTINUED | OUTPATIENT
Start: 2023-07-18 | End: 2023-07-20

## 2023-07-18 RX ORDER — SODIUM CHLORIDE 0.9 % (FLUSH) 0.9 %
5-40 SYRINGE (ML) INJECTION PRN
Status: DISCONTINUED | OUTPATIENT
Start: 2023-07-18 | End: 2023-07-21 | Stop reason: HOSPADM

## 2023-07-18 RX ORDER — OXYCODONE HYDROCHLORIDE AND ACETAMINOPHEN 5; 325 MG/1; MG/1
2 TABLET ORAL EVERY 4 HOURS PRN
Status: DISCONTINUED | OUTPATIENT
Start: 2023-07-18 | End: 2023-07-21 | Stop reason: HOSPADM

## 2023-07-18 RX ORDER — CLONIDINE HYDROCHLORIDE 0.1 MG/1
0.1 TABLET ORAL EVERY 6 HOURS PRN
Status: DISCONTINUED | OUTPATIENT
Start: 2023-07-18 | End: 2023-07-21 | Stop reason: HOSPADM

## 2023-07-18 RX ORDER — METHOCARBAMOL 500 MG/1
750 TABLET, FILM COATED ORAL EVERY 6 HOURS PRN
Status: DISCONTINUED | OUTPATIENT
Start: 2023-07-18 | End: 2023-07-21 | Stop reason: HOSPADM

## 2023-07-18 RX ORDER — KETOROLAC TROMETHAMINE 30 MG/ML
30 INJECTION, SOLUTION INTRAMUSCULAR; INTRAVENOUS EVERY 6 HOURS PRN
Status: DISCONTINUED | OUTPATIENT
Start: 2023-07-18 | End: 2023-07-21 | Stop reason: HOSPADM

## 2023-07-18 RX ORDER — SODIUM CHLORIDE 9 MG/ML
INJECTION, SOLUTION INTRAVENOUS PRN
Status: DISCONTINUED | OUTPATIENT
Start: 2023-07-18 | End: 2023-07-18 | Stop reason: HOSPADM

## 2023-07-18 RX ORDER — KETAMINE HCL 50MG/ML(1)
SYRINGE (ML) INTRAVENOUS PRN
Status: DISCONTINUED | OUTPATIENT
Start: 2023-07-18 | End: 2023-07-18 | Stop reason: SDUPTHER

## 2023-07-18 RX ORDER — HYDROXYZINE PAMOATE 25 MG/1
50 CAPSULE ORAL EVERY 8 HOURS PRN
Status: DISCONTINUED | OUTPATIENT
Start: 2023-07-18 | End: 2023-07-21 | Stop reason: HOSPADM

## 2023-07-18 RX ORDER — ONDANSETRON 2 MG/ML
4 INJECTION INTRAMUSCULAR; INTRAVENOUS EVERY 6 HOURS PRN
Status: DISCONTINUED | OUTPATIENT
Start: 2023-07-18 | End: 2023-07-21 | Stop reason: HOSPADM

## 2023-07-18 RX ORDER — GABAPENTIN 300 MG/1
300 CAPSULE ORAL EVERY 8 HOURS PRN
Status: DISCONTINUED | OUTPATIENT
Start: 2023-07-18 | End: 2023-07-21 | Stop reason: HOSPADM

## 2023-07-18 RX ORDER — ONDANSETRON 2 MG/ML
4 INJECTION INTRAMUSCULAR; INTRAVENOUS
Status: DISCONTINUED | OUTPATIENT
Start: 2023-07-18 | End: 2023-07-18 | Stop reason: HOSPADM

## 2023-07-18 RX ORDER — ENOXAPARIN SODIUM 100 MG/ML
40 INJECTION SUBCUTANEOUS DAILY
Status: DISCONTINUED | OUTPATIENT
Start: 2023-07-18 | End: 2023-07-21 | Stop reason: HOSPADM

## 2023-07-18 RX ORDER — SODIUM CHLORIDE 9 MG/ML
INJECTION, SOLUTION INTRAVENOUS PRN
Status: DISCONTINUED | OUTPATIENT
Start: 2023-07-18 | End: 2023-07-21 | Stop reason: HOSPADM

## 2023-07-18 RX ORDER — SODIUM CHLORIDE 0.9 % (FLUSH) 0.9 %
5-40 SYRINGE (ML) INJECTION EVERY 12 HOURS SCHEDULED
Status: DISCONTINUED | OUTPATIENT
Start: 2023-07-18 | End: 2023-07-18 | Stop reason: HOSPADM

## 2023-07-18 RX ORDER — KETOROLAC TROMETHAMINE 30 MG/ML
INJECTION, SOLUTION INTRAMUSCULAR; INTRAVENOUS PRN
Status: DISCONTINUED | OUTPATIENT
Start: 2023-07-18 | End: 2023-07-18

## 2023-07-18 RX ADMIN — CEFAZOLIN 2000 MG: 2 INJECTION, POWDER, FOR SOLUTION INTRAMUSCULAR; INTRAVENOUS at 07:54

## 2023-07-18 RX ADMIN — SODIUM CHLORIDE: 9 INJECTION, SOLUTION INTRAVENOUS at 09:13

## 2023-07-18 RX ADMIN — ONDANSETRON 4 MG: 2 INJECTION INTRAMUSCULAR; INTRAVENOUS at 07:54

## 2023-07-18 RX ADMIN — OXYCODONE AND ACETAMINOPHEN 2 TABLET: 5; 325 TABLET ORAL at 16:47

## 2023-07-18 RX ADMIN — IOPAMIDOL 75 ML: 755 INJECTION, SOLUTION INTRAVENOUS at 01:32

## 2023-07-18 RX ADMIN — MIDAZOLAM 2 MG: 1 INJECTION INTRAMUSCULAR; INTRAVENOUS at 07:47

## 2023-07-18 RX ADMIN — Medication 25 MG: at 07:50

## 2023-07-18 RX ADMIN — METRONIDAZOLE 500 MG: 500 INJECTION, SOLUTION INTRAVENOUS at 07:58

## 2023-07-18 RX ADMIN — SODIUM CHLORIDE, PRESERVATIVE FREE 10 ML: 5 INJECTION INTRAVENOUS at 11:24

## 2023-07-18 RX ADMIN — ENOXAPARIN SODIUM 40 MG: 100 INJECTION SUBCUTANEOUS at 11:23

## 2023-07-18 RX ADMIN — HYDROMORPHONE HYDROCHLORIDE 0.5 MG: 1 INJECTION, SOLUTION INTRAMUSCULAR; INTRAVENOUS; SUBCUTANEOUS at 08:01

## 2023-07-18 RX ADMIN — SODIUM CHLORIDE: 9 INJECTION, SOLUTION INTRAVENOUS at 06:12

## 2023-07-18 RX ADMIN — DEXMEDETOMIDINE 8 MCG: 100 INJECTION, SOLUTION INTRAVENOUS at 07:50

## 2023-07-18 RX ADMIN — HYDROMORPHONE HYDROCHLORIDE 0.5 MG: 1 INJECTION, SOLUTION INTRAMUSCULAR; INTRAVENOUS; SUBCUTANEOUS at 08:43

## 2023-07-18 RX ADMIN — PROPOFOL 370 MG: 10 INJECTION, EMULSION INTRAVENOUS at 07:50

## 2023-07-18 RX ADMIN — LIDOCAINE HYDROCHLORIDE 60 MG: 20 INJECTION, SOLUTION INFILTRATION; PERINEURAL at 07:50

## 2023-07-18 RX ADMIN — HYDROMORPHONE HYDROCHLORIDE 0.5 MG: 1 INJECTION, SOLUTION INTRAMUSCULAR; INTRAVENOUS; SUBCUTANEOUS at 08:04

## 2023-07-18 RX ADMIN — SODIUM CHLORIDE 1000 ML: 9 INJECTION, SOLUTION INTRAVENOUS at 00:45

## 2023-07-18 RX ADMIN — MORPHINE SULFATE 6 MG: 4 INJECTION, SOLUTION INTRAMUSCULAR; INTRAVENOUS at 00:46

## 2023-07-18 RX ADMIN — METHADONE HYDROCHLORIDE 10 MG: 10 TABLET ORAL at 16:50

## 2023-07-18 RX ADMIN — MEPERIDINE HYDROCHLORIDE 12.5 MG: 25 INJECTION, SOLUTION INTRAMUSCULAR; INTRAVENOUS; SUBCUTANEOUS at 08:53

## 2023-07-18 RX ADMIN — SODIUM CHLORIDE, PRESERVATIVE FREE 10 ML: 5 INJECTION INTRAVENOUS at 20:04

## 2023-07-18 RX ADMIN — KETOROLAC TROMETHAMINE 30 MG: 30 INJECTION, SOLUTION INTRAMUSCULAR; INTRAVENOUS at 07:54

## 2023-07-18 RX ADMIN — KETOROLAC TROMETHAMINE 15 MG: 15 INJECTION, SOLUTION INTRAMUSCULAR; INTRAVENOUS at 02:40

## 2023-07-18 RX ADMIN — CEFTRIAXONE SODIUM 1000 MG: 1 INJECTION, POWDER, FOR SOLUTION INTRAMUSCULAR; INTRAVENOUS at 00:46

## 2023-07-18 RX ADMIN — VANCOMYCIN HYDROCHLORIDE 1250 MG: 1.25 INJECTION, POWDER, LYOPHILIZED, FOR SOLUTION INTRAVENOUS at 01:54

## 2023-07-18 RX ADMIN — VANCOMYCIN HYDROCHLORIDE 1000 MG: 1 INJECTION, POWDER, LYOPHILIZED, FOR SOLUTION INTRAVENOUS at 12:02

## 2023-07-18 RX ADMIN — Medication 25 MG: at 07:53

## 2023-07-18 RX ADMIN — MORPHINE SULFATE 6 MG: 4 INJECTION, SOLUTION INTRAMUSCULAR; INTRAVENOUS at 06:11

## 2023-07-18 RX ADMIN — CEFTRIAXONE SODIUM 1000 MG: 1 INJECTION, POWDER, FOR SOLUTION INTRAMUSCULAR; INTRAVENOUS at 23:25

## 2023-07-18 RX ADMIN — SODIUM CHLORIDE 1000 ML: 9 INJECTION, SOLUTION INTRAVENOUS at 02:36

## 2023-07-18 RX ADMIN — HYDROMORPHONE HYDROCHLORIDE 1 MG: 1 INJECTION, SOLUTION INTRAMUSCULAR; INTRAVENOUS; SUBCUTANEOUS at 08:33

## 2023-07-18 RX ADMIN — DEXMEDETOMIDINE 8 MCG: 100 INJECTION, SOLUTION INTRAVENOUS at 07:54

## 2023-07-18 RX ADMIN — MORPHINE SULFATE 6 MG: 4 INJECTION, SOLUTION INTRAMUSCULAR; INTRAVENOUS at 02:30

## 2023-07-18 RX ADMIN — DEXMEDETOMIDINE 8 MCG: 100 INJECTION, SOLUTION INTRAVENOUS at 08:06

## 2023-07-18 ASSESSMENT — PAIN DESCRIPTION - ONSET
ONSET: ON-GOING

## 2023-07-18 ASSESSMENT — PAIN DESCRIPTION - FREQUENCY
FREQUENCY: CONTINUOUS

## 2023-07-18 ASSESSMENT — PAIN SCALES - GENERAL
PAINLEVEL_OUTOF10: 8
PAINLEVEL_OUTOF10: 6
PAINLEVEL_OUTOF10: 8
PAINLEVEL_OUTOF10: 10
PAINLEVEL_OUTOF10: 7
PAINLEVEL_OUTOF10: 10

## 2023-07-18 ASSESSMENT — PAIN DESCRIPTION - ORIENTATION
ORIENTATION: LEFT

## 2023-07-18 ASSESSMENT — PAIN DESCRIPTION - PAIN TYPE
TYPE: SURGICAL PAIN

## 2023-07-18 ASSESSMENT — PAIN DESCRIPTION - DESCRIPTORS
DESCRIPTORS: BURNING
DESCRIPTORS: BURNING;TINGLING
DESCRIPTORS: BURNING;ACHING
DESCRIPTORS: BURNING;ACHING
DESCRIPTORS: ACHING;THROBBING
DESCRIPTORS: BURNING
DESCRIPTORS: BURNING;ACHING
DESCRIPTORS: THROBBING

## 2023-07-18 ASSESSMENT — PAIN - FUNCTIONAL ASSESSMENT
PAIN_FUNCTIONAL_ASSESSMENT: PREVENTS OR INTERFERES SOME ACTIVE ACTIVITIES AND ADLS
PAIN_FUNCTIONAL_ASSESSMENT: PREVENTS OR INTERFERES WITH MANY ACTIVE NOT PASSIVE ACTIVITIES
PAIN_FUNCTIONAL_ASSESSMENT: PREVENTS OR INTERFERES SOME ACTIVE ACTIVITIES AND ADLS
PAIN_FUNCTIONAL_ASSESSMENT: 0-10
PAIN_FUNCTIONAL_ASSESSMENT: ACTIVITIES ARE NOT PREVENTED
PAIN_FUNCTIONAL_ASSESSMENT: PREVENTS OR INTERFERES SOME ACTIVE ACTIVITIES AND ADLS

## 2023-07-18 ASSESSMENT — PAIN DESCRIPTION - LOCATION
LOCATION: FOOT
LOCATION: LEG
LOCATION: FOOT
LOCATION: LEG

## 2023-07-18 NOTE — H&P
V2.0  History and Physical      Name:  Vasquez Lee /Age/Sex: 1980  (44 y.o. female)   MRN & CSN:  [id number] & [id number] Encounter Date/Time: 2023 7:32 AM EDT   Location:  [address] [place] [address] PCP: Camille Jackson MD       Hospital Day: 2    Assessment and Plan:   Vasquez Lee is a 44 y.o. female with a pmh of Cellulitis with Abscess, IV Drug Abuse, Endocarditis, Chronic Hep C, MRSA Infection who presents with Cellulitis of left leg    Hospital Problems             Last Modified POA    * (Principal) Cellulitis of left leg 2023 Yes       Left Leg Cellulitis with Abscess  -Admit Inpatient on Tele for IV antibiotics and surgical drainage   -Consulted Gen Surg in ED: Dr. Inga Duane to drain abscess in OR   -WBC 17.9, Lactate 1.2, Hgb 10.6, Na 131, Pot 3.9, Cr 0.8, GFR >60, Gluc 112, AG 11  -Blood Cx x 2: pending   -trend CBC & BMP daily   -CT Left Tibia/Fibula:  Cellulitis of the leg worse in the ankle. Anterior ankle superficial soft  tissue 5 x 1 x 3.5 cm fluid collection concerning for abscess. 2. 3 x 2 x 0.5 cm area of fluid concerning for tenosynovitis in the extensor digitorum longus tendon sheath at the level of the midfoot.  -cont IV Vancomycin and Rocephin, pain control prn, antiemetics prn, frequent neuro checks   -Gen Surgery: Op Note  Bilateral Leg Abscesses and Left Foot Abscess, performed Bilat Leg I&D, left foot debridement I&D and drainage with Wound Vac Placement, Wound Cx pending, Blood Cx pending    -Q4 Neuro checks, both legs wrapped    IV Drug Abuse  -last used IV Heroin  at noon, will initiate COWS protocol- can sub in methadone instead of Subutex due to side effects per patient on Subutex- \"hallucinations and loses her ability to know what she's doing\"   -Consulted Case Management due to Homelessness and Drug Use       Disposition:   Current Living situation: Homeless  Expected Disposition: Homeless  Estimated D/C: 3-4 days     Diet ADULT DIET;  Regular   DVT Prophylaxis Admission medications    Medication Sig Start Date End Date Taking? Authorizing Provider   nicotine (NICODERM CQ) 21 MG/24HR Place 1 patch onto the skin daily  Patient not taking: Reported on 7/18/2023 3/14/23   Mark Vázquez MD   silver sulfADIAZINE (SILVADENE) 1 % cream Apply topically daily Apply topically daily. Apply to rt and lt thumb with dressing changes  Patient not taking: Reported on 7/18/2023 3/13/23   Mark Vázquez MD   cloNIDine (CATAPRES) 0.1 MG tablet Take 1 tablet by mouth daily  Patient not taking: Reported on 7/18/2023    Historical Provider, MD   hydrOXYzine pamoate (VISTARIL) 25 MG capsule Take 1 capsule by mouth 3 times daily as needed for Itching or Anxiety  Patient not taking: Reported on 7/18/2023    Historical Provider, MD   mirtazapine (REMERON) 30 MG tablet Take 1 tablet by mouth nightly  Patient not taking: Reported on 7/18/2023    Historical Provider, MD   FLUoxetine (PROZAC) 20 MG capsule Take 1 capsule by mouth in the morning, at noon, and at bedtime  Patient not taking: Reported on 7/18/2023    Historical Provider, MD   oxyCODONE (ROXICODONE) 5 MG immediate release tablet Take 1 tablet by mouth 3 times daily as needed. Patient not taking: Reported on 7/18/2023 1/6/20   Historical Provider, MD   busPIRone (BUSPAR) 7.5 MG tablet Take 1 tablet by mouth 2 times daily  Patient not taking: Reported on 7/18/2023 12/19/19   Historical Provider, MD   gabapentin (NEURONTIN) 300 MG capsule Take 1 capsule by mouth 3 times daily.   Patient not taking: Reported on 7/18/2023 12/19/19   Historical Provider, MD   cloNIDine (CATAPRES) 0.1 MG tablet Take 1 tablet by mouth 2 times daily  Patient not taking: Reported on 7/18/2023    Historical Provider, MD   mirtazapine (REMERON) 15 MG tablet Take 1 tablet by mouth nightly  Patient not taking: Reported on 7/18/2023    Historical Provider, MD       Physical Exam:    Physical Exam     General: NAD  Eyes: EOMI  ENT: neck supple  Cardiovascular: Regular rate

## 2023-07-18 NOTE — CARE COORDINATION
07/18/23 1631   Service Assessment   Patient Orientation Alert and Oriented   Cognition Alert   History Provided By Patient   Primary [address] is: Named in [address]    PCP Verified by CM Yes   Prior Functional Level Independent in ADLs/IADLs   Current Functional Level Independent in ADLs/IADLs   Can patient return to prior living arrangement Unknown at present   Ability to make needs known: Good   Family able to assist with home care needs: Yes   Would you like for me to discuss the discharge plan with any other family members/significant others, and if so, who? No   Financial Resources [employer] None   CM/SW Referral Other (see comment)  (homelessness)     CM in to see Pt to initiate discharge planning. Pt known to this CM from previous admission. Pt lives in a [place] with her significant other. Pt actively threatening to leave AMA. This CM encouraged Pt to stay and to work on possible SNF or inpatient drug rehab. Pt denies any needs at this time. CM updated Pt MIK Alex. Substance abuse resources provided in discharge instructions.  CM following

## 2023-07-18 NOTE — ED PROVIDER NOTES
Ventricular Rate 72 BPM    Atrial Rate 72 BPM    P-R Interval 146 ms    QRS Duration 78 ms    Q-T Interval 422 ms    QTc Calculation (Bazett) 462 ms    P Axis 65 degrees    R Axis 58 degrees    T Axis 37 degrees    Diagnosis       Normal sinus rhythm  Normal ECG  No previous ECGs available         Radiographs (if obtained):  [] The following radiograph was interpreted by myself in the absence of a radiologist:  [x] Radiologist's Report reviewed at time of ED visit:  CT TIBIA FIBULA LEFT W CONTRAST    Result Date: 7/18/2023  1. Cellulitis of the leg worse in the ankle. Anterior ankle superficial soft tissue 5 x 1 x 3.5 cm fluid collection concerning for abscess. 2. 3 x 2 x 0.5 cm area of fluid concerning for tenosynovitis in the extensor digitorum longus tendon sheath at the level of the midfoot. CC/HPI Summary, DDx, ED Course, and Reassessment:   With obvious soft tissue infection on arrival.  She is given Rocephin and bank. Blood cultures pending. Labs are reviewed. Patient does have a significant leukocytosis. Otherwise renal function stable. No significant electrolyte derangement. No lactic acid elevation at this time. Patient has been given pain medication and hydrated throughout her ED stay. Imaging obtained given her significant discomfort. No evidence of necrotizing infection. However patient does have cellulitis in the leg worse than ankle. There is fluid collection in the anterior ankle concerning for abscess. Patient also with fluid in the extensor digitorum longus tendon sheath at the level of midgut concerning for tenosynovitis. Care initially discussed with Ortho. Dr. Justino Root advises that he will defer to general surgery. Dr. Amie Bah recommends consultation with podiatry given the nature of the infection. I was able to reach Dr. Rebecca Fitzpatrick who advises he does not manage this type of infection.       At this time, have contacted [facility].  Dr. Fatuma Xie has accepted extremity    3. Abscess of right lower extremity    4. IV drug abuse [facility])      DISPOSITION Decision To Transfer 07/18/2023 05:06:59 AM      Patient referred to: No follow-up provider specified.   Discharge medications:  New Prescriptions    No medications on file     (Please note that portions of this note may have been completed with a voice recognition program. Efforts were made to edit the dictations but occasionally words are mis-transcribed.)    Kirsten Hoffman, [address], DO  07/18/23 7551

## 2023-07-18 NOTE — PROGRESS NOTES
4 Eyes Skin Assessment     NAME:  Vasquez Lee  YOB: 1980  MEDICAL RECORD NUMBER:  [id number]    The patient is being assessed for  Post-Op Surgical    I agree that at least one RN has performed a thorough Head to Toe Skin Assessment on the patient. ALL assessment sites listed below have been assessed. Areas assessed by both nurses:    Head, Face, Ears, Shoulders, Back, Chest, Arms, Elbows, Hands, Sacrum. Buttock, Coccyx, Ischium, Legs. Feet and Heels, and Under Medical Devices         Does the Patient have a Wound? Yes wound(s) were present on assessment.  LDA wound assessment was Initiated and completed by RN       Leo Prevention initiated by RN: No  Wound Care Orders initiated by RN: Yes    Pressure Injury (Stage 3,4, Unstageable, DTI, NWPT, and Complex wounds) if present, place Wound referral order by RN under : No    New Ostomies, if present place, Ostomy referral order under : No     Nurse 1 eSignature: Electronically signed by Sherri Mills RN on 7/18/23 at 7:08 PM EDT    **SHARE this note so that the co-signing nurse can place an eSignature**    Nurse 2 eSignature: Electronically signed by Kody Willard RN on 7/18/23 at 7:08 PM EDT

## 2023-07-18 NOTE — ED NOTES
Called [facility] transfer center. Spoke to Jonh pt accepted by hospitalist waiting on bed there.      Almita Avendano Buys  07/18/23 5245

## 2023-07-18 NOTE — OP NOTE
Operative Note      Patient: Milagros Nolan  YOB: 1980  MRN: [id number]    Date of Procedure: 7/18/2023    Pre-Op Diagnosis Codes:     * Infection [B99.9]    Post-Op Diagnosis:  bilateral leg abscesses and left foot abscess       Procedure(s):  BILATERAL LEG INCISION AND DRAINAGE  LEFT FOOT DEBRIDEMENT INCISION AND DRAINAGE with WOUND VAC PLACEMENT    Surgeon(s):  Juana Menezes MD    Assistant:   * No surgical staff found *    Anesthesia: General    Estimated Blood Loss (mL): 11VL    Complications: None    Specimens:   ID Type Source Tests Collected by Time Destination   1 : Left Foot Fluid Culture Body Fluid Fluid CULTURE, SURGICAL Juana Menezes MD 7/18/2023 2241        Implants:  * No implants in log *      Drains: * No LDAs found *    Findings: bilateral lower leg abscesses and left foot abscess  Procedure Details:     MAC anesthesia was performed by our CRNA. The patient was positioned appropriately and the skin over the abscess sites on bilateral lower legs and the left foot was prepped with betadine and draped in a sterile fashion. Timeout was performed. Local anesthesia was obtained by infiltration using 25cc of 1% Lidocaine without epinephrine. An incision was then made over the right medial lower leg first and approximately 5cc of purulent fluid was drained. The abscess was 2x4cm and into the subcutaneous tissues. An ellipse of skin was removed. The cavity was cultured. The wound was irrigated with saline and packed with 1/4\" iodoform gauze. Dry dressing was then placed. The left leg and foot were then approached. At the anterolateral ankle an incision was made and a large amount of purulence was encountered. The cavity was found to track medially as well as down onto the dorsum of the foot. An ellipse of skin was removed from the leg and the foot to allow adequate drainage. The 2 abscess cavities communicate along the dorsal surface of the tendons.   The leg

## 2023-07-18 NOTE — ANESTHESIA POSTPROCEDURE EVALUATION
Department of Anesthesiology  Postprocedure Note    Patient: Lucretia Rocha  MRN: [id number]  [facility] [facility] 1980  Date of evaluation: 7/18/2023      Procedure Summary     Date: 07/18/23 Room / Location: [address] [place] [address]    Anesthesia Start: 8538 Anesthesia Stop: 0826    Procedure: FOOT DEBRIDEMENT INCISION AND DRAINAGE (Bilateral: Foot) Diagnosis:       Infection      (Infection [B99.9])    Surgeons: Lexy Gayle MD Responsible Provider: Last Fox MD    Anesthesia Type: MAC ASA Status: 3 - Emergent          Anesthesia Type: MAC    Zach Phase I:      Zach Phase II:        Anesthesia Post Evaluation    Patient location during evaluation: PACU  Patient participation: complete - patient participated  Level of consciousness: sleepy but conscious  Airway patency: patent  Nausea & Vomiting: no nausea and no vomiting  Complications: no  Cardiovascular status: hemodynamically stable  Respiratory status: spontaneous ventilation and nasal cannula  Hydration status: stable

## 2023-07-18 NOTE — CONSULTS
Department of General Surgery   Surgical Service Dr. Francois Centers   Consult Note    Date of Consult: 7/18/23    Reason for Consult:  left foot and ankle abscess  Requesting Physician:  Dr. Deneen Edmonds:  left leg swelling and pain    History Obtained From:  patient    HISTORY OF PRESENT ILLNESS:    The patient is a 44 y.o. female who presented with worsening left foot and leg swelling and pain for the past few days. She reports IV drug use with injections in the ankle and foot. She denies fever or chills. She reports intense pressure like pain. She denies other complaints. CT was performed and shows abscesses on the dorsal foot and anterior ankle. WBC is 17.9k.     Past Medical History:    Past Medical History:   Diagnosis Date    Hep C w/o coma, chronic (HCC)     Hepatitis C antibody positive in blood 03/11/2023    Heroin dependence (HCC)     History of smoking at least 2 packs per day for at least 15 years     MRSA infection        Past Surgical History:    Past Surgical History:   Procedure Laterality Date    CHOLECYSTECTOMY      CYST INCISION AND DRAINAGE      numerous places 6 different areas    LEG SURGERY Bilateral 3/7/2023    UPPER AND LOWER EXTREMEITIES DEBRIDEMENT INCISION AND DRAINAGE performed by Evangelina Pérez DO at [address]  02/27/15    I&D of R shoulder AC joint    SHOULDER SURGERY Right     TOE SURGERY      were amputated in accident and sewn back on    TONSILLECTOMY         Current Medications:   Current Facility-Administered Medications   Medication Dose Route Frequency Provider Last Rate Last Admin    morphine (PF) injection 6 mg  6 mg IntraVENous Q30 Min PRN Tiffani Mendenhall DO   6 mg at 07/18/23 0611    0.9 % sodium chloride infusion   IntraVENous Continuous Allie Mendenhall  mL/hr at 07/18/23 0714 NoRateChange at 07/18/23 0714    [START ON 7/19/2023] cefTRIAXone (ROCEPHIN) 1,000 mg in sodium chloride 0.9 % 50 mL IVPB (mini-bag)  1,000 mg IntraVENous

## 2023-07-18 NOTE — ED NOTES
ED TO INPATIENT SBAR HANDOFF    Patient Name: Viridiana Jimenez   :  1980  44 y.o. Preferred Name    Family/Caregiver Present no   Restraints no   C-SSRS: Risk of Suicide: No Risk  Sitter no   Sepsis Risk Score Sepsis Risk Score: 2.16      Situation  Chief Complaint   Patient presents with    Abscess     Brief Description of Patient's Condition: PT A/o x 4   VSS    IV drug abuse x 12 years. Recent surgery in March for abscesses in her leg. Back tonight for LLE swelling and pain after injecting in it. + pedal pulses  +4 edema and redness in leg/foot. To OR with Dr. Mariel Lewis for I&D   Mental Status: oriented  Arrived from: home    Imaging:   CT TIBIA FIBULA LEFT W CONTRAST   Preliminary Result   1. Cellulitis of the leg worse in the ankle. Anterior ankle superficial soft   tissue 5 x 1 x 3.5 cm fluid collection concerning for abscess. 2. 3 x 2 x 0.5 cm area of fluid concerning for tenosynovitis in the extensor   digitorum longus tendon sheath at the level of the midfoot.            Abnormal labs:   Abnormal Labs Reviewed   COMPREHENSIVE METABOLIC PANEL - Abnormal; Notable for the following components:       Result Value    Sodium 131 (*)     Chloride 97 (*)     Glucose 112 (*)     ALT 6 (*)     AST 11 (*)     All other components within normal limits   CBC WITH AUTO DIFFERENTIAL - Abnormal; Notable for the following components:    WBC 17.9 (*)     RBC 3.72 (*)     Hemoglobin 10.6 (*)     Hematocrit 32.7 (*)     Segs Relative 87.2 (*)     Lymphocytes % 7.2 (*)     Monocytes % 4.7 (*)     Immature Neutrophil % 0.6 (*)     All other components within normal limits   POCT GLUCOSE - Abnormal; Notable for the following components:    POC Glucose 125 (*)     All other components within normal limits       Background  History:   Past Medical History:   Diagnosis Date    Hep C w/o coma, chronic (HCC)     Hepatitis C antibody positive in blood 2023    Heroin dependence ([address] )     History of smoking at

## 2023-07-18 NOTE — PROGRESS NOTES
5331- pt. Arrived to pacu via cart from OR. Pt. Attached to monitor and alarms are on. Received report from KIMBERLEY BANG and Demar Mota RN. Pt. Is drowsy from anesthesia, but responds to verbal stimuli and able to follow commands. Pt. Is wearing a simple mask at 8L. ST on the monitor. Dressings to bilateral legs are clean dry and intact. Wound vac at -125 to left leg. 10cc of bloody drainage noted in cannister. Pt. Leg elevated on pillow. Pt. IV infusing without difficulty. 0845- pt. Able to use bed pan. 100cc of dark yellow urine emptied. 8665- pt. Is resting with eyes closed but awakens easily with verbal stimuli. Pt. Will fall asleep and then wake up and state shes in pain. Pt. Educated on pain interventions provided. Emotional support and encouragement provided. Pt. Pa Lainez understanding and will fall back asleep. Pt. Is on 2L via NC. ST on the monitor. Dressing to to bilateral LLE are clean dry and intact. Pt. Has tolerated ice chips. At this time pt. Is ready to transfer to floor for continuation of care. Called and gave report to Ayah white RN. Pt. Updated on plan of care and denies any other questions.

## 2023-07-18 NOTE — ED PROVIDER NOTES
Patient was evaluated by Dr. Ragini Moulton as she is still awaiting a bed at [facility], he was able to review the imaging and he evaluated the patient. Due to serious concerns that she would not follow-up if she is transferred to another city, the abscess does appear to be more superficial, he gave her the option of doing surgery here to try and drain the pus as it would be potentially a wait in order to get a bed at [facility] as they are quite full, she prefers to stay here in town and proceed with surgery here and continue treatment and see how she does.   We will admit to hospitalist team.     Belkys Watt MD  07/18/23 4121        Spoke with ROXANNE Leonard for hospitalist team and she will see patient, accepted to their team.      Belkys Watt MD  07/18/23 0541

## 2023-07-18 NOTE — DISCHARGE INSTRUCTIONS
Alcohol [facility] At [address] [place] [address]   [phone] or [phone]  Intensive Outpatient and Outpatient treatment for both men & women  Walk in potential Mon - Fri: 8 am - 4 pm; closed 11:30 am - 1 pm     Naun Rangel   [phone]   Intensive Outpatient and Residential for men & Intensive Outpatient for women   Walk in potential Mon - Fri: 10 am - 1 pm     [facility]      [phone]  Comprehensive Outpatient Addiction Treatment  Walk in potential Mon -Fri: 8 am - 3 pm     Alcohol Treatment Facilities:   Call for insurance eligibility vs cost  [place] [phone]  The [facility] at [address] [facility]  [employer] [address] [facility] [address] [address] [address] [phone]  [address] [phone]  [facility] [address] [facility] [address] [place] [address] [phone]    Alcoholics Anonymous/ ALANON/ALATEEN [phone] or [phone]  https://cogf. meetingfor. me/meetings or www. aacentral[place]. org      Mental Health Crisis Line: 408.966.2158    [facility] with [address]: [address] [place] [address] Rd: [phone]  [facility]: www.smartrecovery. org  [place] Quit Line: (smoking) https://[place]. quitlogix. org 800-QUIT-NOW (480-588-0988)  24/7 crisis line for [facility]: [phone]  24-hour crisis text hotline: Text the word \"4hope\" to 380-045 for crisis support    Information & Referral for [facility]  Referral line to connect with available resources/services  [facility] [phone]    All of these apartments are listed as income based:  [facility]/SURGICAL [facility]   [address] [facility], [address] [address]  [phone]  [address] [address] [address]  [phone]    [facility]  [address]  [phone]    [facility]

## 2023-07-19 PROBLEM — L03.115 BILATERAL LOWER LEG CELLULITIS: Status: ACTIVE | Noted: 2023-07-18

## 2023-07-19 PROBLEM — L02.612 ABSCESS OF LEFT FOOT: Status: ACTIVE | Noted: 2023-07-19

## 2023-07-19 PROBLEM — L02.415 ABSCESS OF RIGHT LOWER EXTREMITY: Status: ACTIVE | Noted: 2023-07-19

## 2023-07-19 PROBLEM — M65.9: Status: ACTIVE | Noted: 2023-07-19

## 2023-07-19 PROBLEM — M65.969: Status: ACTIVE | Noted: 2023-07-19

## 2023-07-19 PROBLEM — F19.10 IV DRUG ABUSE (HCC): Status: ACTIVE | Noted: 2023-07-19

## 2023-07-19 LAB
AMPHETAMINES: ABNORMAL
ANION GAP SERPL CALCULATED.3IONS-SCNC: 9 MMOL/L (ref 4–16)
BACTERIA: ABNORMAL /HPF
BARBITURATE SCREEN URINE: NEGATIVE
BASOPHILS ABSOLUTE: 0 K/CU MM
BASOPHILS RELATIVE PERCENT: 0.2 % (ref 0–1)
BENZODIAZEPINE SCREEN, URINE: ABNORMAL
BILIRUBIN URINE: NEGATIVE MG/DL
BLOOD, URINE: ABNORMAL
BUN SERPL-MCNC: 12 MG/DL (ref 6–23)
CALCIUM SERPL-MCNC: 7.9 MG/DL (ref 8.3–10.6)
CANNABINOID SCREEN URINE: ABNORMAL
CHLORIDE BLD-SCNC: 109 MMOL/L (ref 99–110)
CLARITY: CLEAR
CO2: 22 MMOL/L (ref 21–32)
COCAINE METABOLITE: ABNORMAL
COLOR: YELLOW
CREAT SERPL-MCNC: 0.7 MG/DL (ref 0.6–1.1)
DIFFERENTIAL TYPE: ABNORMAL
EOSINOPHILS ABSOLUTE: 0.1 K/CU MM
EOSINOPHILS RELATIVE PERCENT: 0.5 % (ref 0–3)
FENTANYL URINE: ABNORMAL
GFR SERPL CREATININE-BSD FRML MDRD: >60 ML/MIN/1.73M2
GLUCOSE SERPL-MCNC: 109 MG/DL (ref 70–99)
GLUCOSE, URINE: NEGATIVE MG/DL
HCT VFR BLD CALC: 35 % (ref 37–47)
HEMOGLOBIN: 11.3 GM/DL (ref 12.5–16)
IMMATURE NEUTROPHIL %: 0.4 % (ref 0–0.43)
INTERPRETATION: NORMAL
KETONES, URINE: NEGATIVE MG/DL
LEUKOCYTE ESTERASE, URINE: NEGATIVE
LYMPHOCYTES ABSOLUTE: 1.1 K/CU MM
LYMPHOCYTES RELATIVE PERCENT: 9.3 % (ref 24–44)
MCH RBC QN AUTO: 29.2 PG (ref 27–31)
MCHC RBC AUTO-ENTMCNC: 32.3 % (ref 32–36)
MCV RBC AUTO: 90.4 FL (ref 78–100)
MONOCYTES ABSOLUTE: 0.5 K/CU MM
MONOCYTES RELATIVE PERCENT: 4.2 % (ref 0–4)
MUCUS: ABNORMAL HPF
NITRITE URINE, QUANTITATIVE: NEGATIVE
NUCLEATED RBC %: 0 %
OPIATES, URINE: ABNORMAL
OXYCODONE, OPI5M: ABNORMAL
PDW BLD-RTO: 13 % (ref 11.7–14.9)
PH, URINE: 5.5 (ref 5–8)
PLATELET # BLD: 220 K/CU MM (ref 140–440)
PMV BLD AUTO: 10.5 FL (ref 7.5–11.1)
POTASSIUM SERPL-SCNC: 4.1 MMOL/L (ref 3.5–5.1)
PREGNANCY, URINE: NEGATIVE
PROTEIN UA: ABNORMAL MG/DL
RBC # BLD: 3.87 M/CU MM (ref 4.2–5.4)
RBC URINE: 3 /HPF (ref 0–6)
SEGMENTED NEUTROPHILS ABSOLUTE COUNT: 9.9 K/CU MM
SEGMENTED NEUTROPHILS RELATIVE PERCENT: 85.4 % (ref 36–66)
SODIUM BLD-SCNC: 140 MMOL/L (ref 135–145)
SPECIFIC GRAVITY UA: 1.02 (ref 1–1.03)
SQUAMOUS EPITHELIAL: 5 /HPF
TOTAL IMMATURE NEUTOROPHIL: 0.05 K/CU MM
TOTAL NUCLEATED RBC: 0 K/CU MM
TRICHOMONAS: ABNORMAL /HPF
UROBILINOGEN, URINE: 1 MG/DL (ref 0.2–1)
WBC # BLD: 11.6 K/CU MM (ref 4–10.5)
WBC UA: 3 /HPF (ref 0–5)

## 2023-07-19 PROCEDURE — 6370000000 HC RX 637 (ALT 250 FOR IP): Performed by: SURGERY

## 2023-07-19 PROCEDURE — 87389 HIV-1 AG W/HIV-1&-2 AB AG IA: CPT

## 2023-07-19 PROCEDURE — 36415 COLL VENOUS BLD VENIPUNCTURE: CPT

## 2023-07-19 PROCEDURE — 80048 BASIC METABOLIC PNL TOTAL CA: CPT

## 2023-07-19 PROCEDURE — 6370000000 HC RX 637 (ALT 250 FOR IP): Performed by: NURSE PRACTITIONER

## 2023-07-19 PROCEDURE — 2580000003 HC RX 258: Performed by: NURSE PRACTITIONER

## 2023-07-19 PROCEDURE — 81001 URINALYSIS AUTO W/SCOPE: CPT

## 2023-07-19 PROCEDURE — 6360000002 HC RX W HCPCS: Performed by: NURSE PRACTITIONER

## 2023-07-19 PROCEDURE — 76937 US GUIDE VASCULAR ACCESS: CPT

## 2023-07-19 PROCEDURE — 99223 1ST HOSP IP/OBS HIGH 75: CPT | Performed by: INTERNAL MEDICINE

## 2023-07-19 PROCEDURE — 86592 SYPHILIS TEST NON-TREP QUAL: CPT

## 2023-07-19 PROCEDURE — 99024 POSTOP FOLLOW-UP VISIT: CPT | Performed by: SURGERY

## 2023-07-19 PROCEDURE — 6360000002 HC RX W HCPCS: Performed by: SURGERY

## 2023-07-19 PROCEDURE — 94761 N-INVAS EAR/PLS OXIMETRY MLT: CPT

## 2023-07-19 PROCEDURE — 85025 COMPLETE CBC W/AUTO DIFF WBC: CPT

## 2023-07-19 PROCEDURE — 97605 NEG PRS WND THER DME<=50SQCM: CPT

## 2023-07-19 PROCEDURE — 2580000003 HC RX 258: Performed by: SURGERY

## 2023-07-19 PROCEDURE — 80307 DRUG TEST PRSMV CHEM ANLYZR: CPT

## 2023-07-19 PROCEDURE — 1200000000 HC SEMI PRIVATE

## 2023-07-19 PROCEDURE — 81025 URINE PREGNANCY TEST: CPT

## 2023-07-19 PROCEDURE — APPSS60 APP SPLIT SHARED TIME 46-60 MINUTES: Performed by: NURSE PRACTITIONER

## 2023-07-19 RX ORDER — METRONIDAZOLE 250 MG/1
500 TABLET ORAL EVERY 8 HOURS SCHEDULED
Status: DISCONTINUED | OUTPATIENT
Start: 2023-07-19 | End: 2023-07-21 | Stop reason: HOSPADM

## 2023-07-19 RX ADMIN — VANCOMYCIN HYDROCHLORIDE 1000 MG: 1 INJECTION, POWDER, LYOPHILIZED, FOR SOLUTION INTRAVENOUS at 13:03

## 2023-07-19 RX ADMIN — METRONIDAZOLE 500 MG: 250 TABLET ORAL at 16:37

## 2023-07-19 RX ADMIN — HYDROXYZINE PAMOATE 50 MG: 25 CAPSULE ORAL at 16:37

## 2023-07-19 RX ADMIN — TRAZODONE HYDROCHLORIDE 50 MG: 50 TABLET ORAL at 20:15

## 2023-07-19 RX ADMIN — MORPHINE SULFATE 2 MG: 2 INJECTION, SOLUTION INTRAMUSCULAR; INTRAVENOUS at 20:16

## 2023-07-19 RX ADMIN — VANCOMYCIN HYDROCHLORIDE 1000 MG: 1 INJECTION, POWDER, LYOPHILIZED, FOR SOLUTION INTRAVENOUS at 00:21

## 2023-07-19 RX ADMIN — SODIUM CHLORIDE, PRESERVATIVE FREE 10 ML: 5 INJECTION INTRAVENOUS at 20:20

## 2023-07-19 RX ADMIN — HYDROMORPHONE HYDROCHLORIDE 1 MG: 1 INJECTION, SOLUTION INTRAMUSCULAR; INTRAVENOUS; SUBCUTANEOUS at 05:09

## 2023-07-19 RX ADMIN — GABAPENTIN 300 MG: 300 CAPSULE ORAL at 08:20

## 2023-07-19 RX ADMIN — HYDROXYZINE PAMOATE 50 MG: 25 CAPSULE ORAL at 08:20

## 2023-07-19 RX ADMIN — HYDROMORPHONE HYDROCHLORIDE 1 MG: 1 INJECTION, SOLUTION INTRAMUSCULAR; INTRAVENOUS; SUBCUTANEOUS at 08:24

## 2023-07-19 RX ADMIN — METHOCARBAMOL 750 MG: 500 TABLET ORAL at 14:16

## 2023-07-19 RX ADMIN — METHADONE HYDROCHLORIDE 10 MG: 10 TABLET ORAL at 06:06

## 2023-07-19 RX ADMIN — METHOCARBAMOL 750 MG: 500 TABLET ORAL at 20:15

## 2023-07-19 RX ADMIN — OXYCODONE AND ACETAMINOPHEN 2 TABLET: 5; 325 TABLET ORAL at 10:00

## 2023-07-19 RX ADMIN — OXYCODONE AND ACETAMINOPHEN 2 TABLET: 5; 325 TABLET ORAL at 03:04

## 2023-07-19 RX ADMIN — METRONIDAZOLE 500 MG: 250 TABLET ORAL at 22:35

## 2023-07-19 RX ADMIN — HYDROMORPHONE HYDROCHLORIDE 1 MG: 1 INJECTION, SOLUTION INTRAMUSCULAR; INTRAVENOUS; SUBCUTANEOUS at 00:25

## 2023-07-19 RX ADMIN — HYDROMORPHONE HYDROCHLORIDE 1 MG: 1 INJECTION, SOLUTION INTRAMUSCULAR; INTRAVENOUS; SUBCUTANEOUS at 16:37

## 2023-07-19 RX ADMIN — CEFEPIME 2000 MG: 2 INJECTION, POWDER, FOR SOLUTION INTRAVENOUS at 16:40

## 2023-07-19 RX ADMIN — MORPHINE SULFATE 2 MG: 2 INJECTION, SOLUTION INTRAMUSCULAR; INTRAVENOUS at 13:07

## 2023-07-19 RX ADMIN — KETOROLAC TROMETHAMINE 30 MG: 30 INJECTION, SOLUTION INTRAMUSCULAR; INTRAVENOUS at 14:16

## 2023-07-19 RX ADMIN — GABAPENTIN 300 MG: 300 CAPSULE ORAL at 16:37

## 2023-07-19 ASSESSMENT — PAIN SCALES - GENERAL
PAINLEVEL_OUTOF10: 10
PAINLEVEL_OUTOF10: 7
PAINLEVEL_OUTOF10: 10
PAINLEVEL_OUTOF10: 10
PAINLEVEL_OUTOF10: 0
PAINLEVEL_OUTOF10: 10
PAINLEVEL_OUTOF10: 10
PAINLEVEL_OUTOF10: 6

## 2023-07-19 ASSESSMENT — PAIN DESCRIPTION - DESCRIPTORS
DESCRIPTORS: ACHING;BURNING;DISCOMFORT
DESCRIPTORS: BURNING;DISCOMFORT;STABBING
DESCRIPTORS: DISCOMFORT;STABBING

## 2023-07-19 ASSESSMENT — PAIN DESCRIPTION - ORIENTATION
ORIENTATION: LEFT
ORIENTATION: RIGHT;LEFT
ORIENTATION: RIGHT;LEFT
ORIENTATION: LEFT
ORIENTATION: RIGHT;LEFT
ORIENTATION: RIGHT;LEFT
ORIENTATION: LEFT
ORIENTATION: LEFT
ORIENTATION: RIGHT;LEFT
ORIENTATION: LEFT

## 2023-07-19 ASSESSMENT — PAIN DESCRIPTION - LOCATION
LOCATION: LEG

## 2023-07-19 NOTE — CARE COORDINATION
CM in to see Pt to follow up on discharge planning. Plan at this time is to return to her [facility] with her . Pt adamantly refusing SNF placement at this time. Pt states she cannot leave her . Pt is willing to follow with the wound clinic. PS to Dr. Noman Live to update  PS to Ulises Diez, NP to update  PS to Dr. Aurora Cardenas to update    Pt denies any needs at this time.   CM following

## 2023-07-19 NOTE — PROGRESS NOTES
V2.0  [facility] Hospitalist Progress Note      Name:  Eleuterio Zavaleta /Age/Sex: 1980  (44 y.o. female)   MRN & CSN:  [id number] & [id number] Encounter Date/Time: 2023 10:15 AM EDT    Location:  [address] [place] [address] PCP: Arnav Schwarz MD       Hospital Day: 3    Assessment and Plan:   Eleuterio Zavaleta is a 44 y.o. female with pmh of Cellulitis with Abscess, IV Drug Abuse, Endocarditis, Chronic Hep C, MRSA Infection  who presents with Bilateral lower leg cellulitis      Plan:  Bilateral Leg Cellulitis with Left Foot Abscess  -Admit Inpatient on Tele for IV antibiotics and surgical drainage   -Consulted Gen Surg in ED: Dr. Inez Lee drained abscess in OR  AM   -WBC 17.9, Lactate 1.2, Hgb 10.6, Na 131, Pot 3.9, Cr 0.8, GFR >60, Gluc 112, AG 11  -trend CBC & BMP daily   -CT Left Tibia/Fibula:  Cellulitis of the leg worse in the ankle. Anterior ankle superficial soft  tissue 5 x 1 x 3.5 cm fluid collection concerning for abscess. 2. 3 x 2 x 0.5 cm area of fluid concerning for tenosynovitis in the extensor digitorum longus tendon sheath at the level of the midfoot.  -cont IV Vancomycin and Rocephin, pain control prn, antiemetics prn, frequent neuro checks   -Gen Surgery: Op Note  Bilateral Leg Abscesses and Left Foot Abscess, performed Bilat Leg I&D, left foot debridement I&D and drainage with Wound Vac Placement,  Blood Cx pending    -Q4 Neuro checks, both legs wrapped  -Surgical Wound Cx: Staph Aureus, Enterobacter Cloacae, Streptococcus Constellatus, sensitivity to follow   -Consulted ID: Dr. Javon Reaves, appreciate recs      IV Drug Abuse  -last used IV Heroin  at noon, will initiate COWS protocol- can sub in methadone instead of Subutex due to side effects per patient on Subutex- \"hallucinations and loses her ability to know what she's doing\"   -Consulted Case Management due to Homelessness and Drug Use   -UDS + cocaine, amphetamines, benzos, cannabinoids, fentanyl, opiates       Diet ADULT DIET;  Regular - 16.0 GM/DL    Hematocrit 35.0 (L) 37 - 47 %    MCV 90.4 78 - 100 FL    MCH 29.2 27 - 31 PG    MCHC 32.3 32.0 - 36.0 %    RDW 13.0 11.7 - 14.9 %    Platelets 305 526 - 722 K/CU MM    MPV 10.5 7.5 - 11.1 FL    Differential Type AUTOMATED DIFFERENTIAL     Segs Relative 85.4 (H) 36 - 66 %    Lymphocytes % 9.3 (L) 24 - 44 %    Monocytes % 4.2 (H) 0 - 4 %    Eosinophils % 0.5 0 - 3 %    Basophils % 0.2 0 - 1 %    Segs Absolute 9.9 K/CU MM    Lymphocytes Absolute 1.1 K/CU MM    Monocytes Absolute 0.5 K/CU MM    Eosinophils Absolute 0.1 K/CU MM    Basophils Absolute 0.0 K/CU MM    Nucleated RBC % 0.0 %    Total Nucleated RBC 0.0 K/CU MM    Total Immature Neutrophil 0.05 K/CU MM    Immature Neutrophil % 0.4 0 - 0.43 %   Urinalysis    Collection Time: 07/19/23  9:05 AM   Result Value Ref Range    Color, UA YELLOW YELLOW    Clarity, UA CLEAR CLEAR    Glucose, Urine NEGATIVE NEGATIVE MG/DL    Bilirubin Urine NEGATIVE NEGATIVE MG/DL    Ketones, Urine NEGATIVE NEGATIVE MG/DL    Specific Gravity, UA 1.020 1.001 - 1.035    Blood, Urine SMALL NUMBER OR AMOUNT OBSERVED (A) NEGATIVE    pH, Urine 5.5 5.0 - 8.0    Protein, UA TRACE (A) NEGATIVE MG/DL    Urobilinogen, Urine 1.0 0.2 - 1.0 MG/DL    Nitrite Urine, Quantitative NEGATIVE NEGATIVE    Leukocyte Esterase, Urine NEGATIVE NEGATIVE   Drug screen multi urine    Collection Time: 07/19/23  9:05 AM   Result Value Ref Range    Cannabinoid Scrn, Ur UNCONFIRMED POSITIVE (A) NEGATIVE    Amphetamines UNCONFIRMED POSITIVE (A) NEGATIVE    Cocaine Metabolite UNCONFIRMED POSITIVE (A) NEGATIVE    Benzodiazepine Screen, Urine UNCONFIRMED POSITIVE (A) NEGATIVE    Barbiturate Screen, Ur NEGATIVE NEGATIVE    Opiates, Urine UNCONFIRMED POSITIVE (A) NEGATIVE    Oxycodone UNCONFIRMED POSITIVE (A) NEGATIVE    Fentanyl, Ur UNCONFIRMED POSITIVE (A) NEGATIVE   Pregnancy, urine    Collection Time: 07/19/23  9:05 AM   Result Value Ref Range    Pregnancy, Urine NEGATIVE NEGATIVE    Interpretation       HCG

## 2023-07-19 NOTE — CONSULTS
Infectious Disease Consult Note  2023   Patient Name: Danita Rivera : 1980   Impression  Polymicrobial BLE Cellulitis and Abscesses s/p I&D:  Possible Tenosynovitis Left Midfoot:  Secondary to IVDU:  No known allergies to ABX  CrCl 89  Afebrile, leukocytosis max 17.9 trended down to 11.6  -BC 0/2 NGTD at 24 hours  -CT Tibia/Fibula Left W Contrast: 1. Cellulitis of the leg worse in the ankle. Anterior ankle superficial soft   tissue 5 x 1 x 3.5 cm fluid collection concerning for abscess. 2. 3 x 2 x 0.5 cm area of fluid concerning for tenosynovitis in the extensor   digitorum longus tendon sheath at the level of the midfoot. -S/p per Dr. Pj Cheatham: Bilateral leg I&D, left foot debridement I&D with wound VAC placement. DX:  bilateral leg abscesses and left foot abscess. Cultures: Prelim: Staph aureus, Enterobacter cloacae, Streptococcus constellatus. Ortho has been consulted to eval possible tenosynovitis. PWID (IV heroin and crack cocaine since age 19)/ Marijuana Use/ Tobacco Abuse:  Reports last heroin use at  at noon  -Urine and Blood Drug screens: positive for amphetamine, cocaine, benzodiazepines, marijuana, fentanyl, opiates and oxycodone  Chronic Hepatitis C:  3/10/2023: Hep C Ab reactive, Hep A total Ab negative, Hep B Ab 987, Hep B Core Total Ab positive  Homelessness:  Multi-morbidity: per PMHx:  past endocarditis, h/o right shoulder OM    Plan:  DC IV ceftriaxone   Start IV cefepime 1 gm q8h  Start po Flagyl 500 mg tid  Continue IV vancomycin per pharmacy dosing  Trend CRP, ordered  Await final BC  Await Final surgical cultures  Ordered HIV and RPR  (last done )    Thank you for allowing me to consult in the care of this patient.  ------------------------  REASON FOR CONSULT: Infective syndrome \"IV Drug Abuse, Bilat LE Cellulitis, s/p Surgical Abscess Drainage, on IV Vanc/Cefipime\"  Requested by: REMY Veras  HPI:Patient is a 44 y.o.   03/08/2023    Cellulitis 03/07/2023    Abscess of left foot 07/19/2023    Bilateral lower leg cellulitis 07/18/2023    Surgical wound, non healing 04/04/2023    WD-Chronic ulcer of right lower extremity with fat layer exposed ([address]) 09/05/2018    WD-Chronic ulcer of left lower extremity with fat layer exposed ([address]) 09/05/2018    Non-healing wound of lower extremity 08/29/2018    Non-healing wound of lower extremity 08/29/2018    Endocarditis     Abnormal echocardiogram     Cocaine abuse ([address])     Opioid abuse ([address])      Past Medical History:   Diagnosis Date    Crack cocaine use     Fentanyl dependence (HCC)     Hep C w/o coma, chronic (HCC)     Hepatitis C antibody positive in blood 03/11/2023    Heroin abuse ([address])     Heroin dependence ([address])     History of smoking at least 2 packs per day for at least 15 years     MRSA infection       Past Surgical History:   Procedure Laterality Date    CHOLECYSTECTOMY      CYST INCISION AND DRAINAGE      numerous places 6 different areas    FOOT DEBRIDEMENT Bilateral 7/18/2023    FOOT DEBRIDEMENT INCISION AND DRAINAGE performed by Julio Clemens MD at [address] [place] [address] Bilateral 3/7/2023    UPPER AND LOWER EXTREMEITIES DEBRIDEMENT INCISION AND DRAINAGE performed by Leland Villarreal DO at [address] [place] [address]  02/27/15    I&D of R shoulder AC joint    SHOULDER SURGERY Right     TOE SURGERY      were amputated in accident and sewn back on    TONSILLECTOMY        Family History   Problem Relation Age of Onset    Heart Disease Mother     Asthma Mother     Cancer Maternal Grandmother     Heart Disease Maternal Grandmother     Asthma Maternal Grandmother       Infectious disease related family history - not contibutory.    SOCIAL HISTORY  Social History     Tobacco Use    Smoking status: Every Day     Packs/day: 1.00     Types: Cigarettes    Smokeless tobacco: Never   Substance Use Topics    Alcohol use: No      Born:[place], [place]   Lives:[place], [place],

## 2023-07-19 NOTE — CONSULTS
Consult completed. Nexiva 20g 1.75 inch peripheral IV initiated into right anterior forearm x 1 attempt with ultrasound guidance. Brisk blood return, flushes without resistance. Patient tolerated well. Primary nurse [place] notified. Please consult IV/PICC team if patient's needs change, questions, or concerns.

## 2023-07-20 LAB
ANION GAP SERPL CALCULATED.3IONS-SCNC: 14 MMOL/L (ref 4–16)
BASOPHILS ABSOLUTE: 0 K/CU MM
BASOPHILS RELATIVE PERCENT: 0.3 % (ref 0–1)
BUN SERPL-MCNC: 8 MG/DL (ref 6–23)
CALCIUM SERPL-MCNC: 7.9 MG/DL (ref 8.3–10.6)
CHLORIDE BLD-SCNC: 106 MMOL/L (ref 99–110)
CO2: 18 MMOL/L (ref 21–32)
CREAT SERPL-MCNC: 0.6 MG/DL (ref 0.6–1.1)
DIFFERENTIAL TYPE: ABNORMAL
EOSINOPHILS ABSOLUTE: 0 K/CU MM
EOSINOPHILS RELATIVE PERCENT: 0.3 % (ref 0–3)
GFR SERPL CREATININE-BSD FRML MDRD: >60 ML/MIN/1.73M2
GLUCOSE SERPL-MCNC: 92 MG/DL (ref 70–99)
HCT VFR BLD CALC: 36 % (ref 37–47)
HEMOGLOBIN: 11 GM/DL (ref 12.5–16)
HIV 1+2 AB+HIV1P24 AG SERPLBLD IA.RAPID: NON REACTIVE
IMMATURE NEUTROPHIL %: 0.5 % (ref 0–0.43)
LYMPHOCYTES ABSOLUTE: 0.8 K/CU MM
LYMPHOCYTES RELATIVE PERCENT: 8.3 % (ref 24–44)
MCH RBC QN AUTO: 28.5 PG (ref 27–31)
MCHC RBC AUTO-ENTMCNC: 30.6 % (ref 32–36)
MCV RBC AUTO: 93.3 FL (ref 78–100)
MONOCYTES ABSOLUTE: 0.5 K/CU MM
MONOCYTES RELATIVE PERCENT: 4.5 % (ref 0–4)
NUCLEATED RBC %: 0 %
PDW BLD-RTO: 12.8 % (ref 11.7–14.9)
PLATELET # BLD: 205 K/CU MM (ref 140–440)
PMV BLD AUTO: 9.2 FL (ref 7.5–11.1)
POTASSIUM SERPL-SCNC: 3.7 MMOL/L (ref 3.5–5.1)
RBC # BLD: 3.86 M/CU MM (ref 4.2–5.4)
RPR SER QL: NON REACTIVE
SEGMENTED NEUTROPHILS ABSOLUTE COUNT: 8.6 K/CU MM
SEGMENTED NEUTROPHILS RELATIVE PERCENT: 86.1 % (ref 36–66)
SODIUM BLD-SCNC: 138 MMOL/L (ref 135–145)
TOTAL IMMATURE NEUTOROPHIL: 0.05 K/CU MM
TOTAL NUCLEATED RBC: 0 K/CU MM
WBC # BLD: 10 K/CU MM (ref 4–10.5)

## 2023-07-20 PROCEDURE — 2580000003 HC RX 258: Performed by: NURSE PRACTITIONER

## 2023-07-20 PROCEDURE — 2580000003 HC RX 258: Performed by: SURGERY

## 2023-07-20 PROCEDURE — 6360000002 HC RX W HCPCS: Performed by: SURGERY

## 2023-07-20 PROCEDURE — 6370000000 HC RX 637 (ALT 250 FOR IP): Performed by: NURSE PRACTITIONER

## 2023-07-20 PROCEDURE — 99024 POSTOP FOLLOW-UP VISIT: CPT | Performed by: SURGERY

## 2023-07-20 PROCEDURE — 6370000000 HC RX 637 (ALT 250 FOR IP): Performed by: SURGERY

## 2023-07-20 PROCEDURE — 6360000002 HC RX W HCPCS: Performed by: NURSE PRACTITIONER

## 2023-07-20 PROCEDURE — 80048 BASIC METABOLIC PNL TOTAL CA: CPT

## 2023-07-20 PROCEDURE — 1200000000 HC SEMI PRIVATE

## 2023-07-20 PROCEDURE — 36415 COLL VENOUS BLD VENIPUNCTURE: CPT

## 2023-07-20 PROCEDURE — 87522 HEPATITIS C REVRS TRNSCRPJ: CPT

## 2023-07-20 PROCEDURE — 85025 COMPLETE CBC W/AUTO DIFF WBC: CPT

## 2023-07-20 PROCEDURE — 94761 N-INVAS EAR/PLS OXIMETRY MLT: CPT

## 2023-07-20 PROCEDURE — 99232 SBSQ HOSP IP/OBS MODERATE 35: CPT | Performed by: NURSE PRACTITIONER

## 2023-07-20 RX ADMIN — VANCOMYCIN HYDROCHLORIDE 1000 MG: 1 INJECTION, POWDER, LYOPHILIZED, FOR SOLUTION INTRAVENOUS at 00:30

## 2023-07-20 RX ADMIN — METRONIDAZOLE 500 MG: 250 TABLET ORAL at 16:10

## 2023-07-20 RX ADMIN — SODIUM CHLORIDE, PRESERVATIVE FREE 10 ML: 5 INJECTION INTRAVENOUS at 08:13

## 2023-07-20 RX ADMIN — HYDROMORPHONE HYDROCHLORIDE 1 MG: 1 INJECTION, SOLUTION INTRAMUSCULAR; INTRAVENOUS; SUBCUTANEOUS at 20:41

## 2023-07-20 RX ADMIN — METHOCARBAMOL 750 MG: 500 TABLET ORAL at 08:14

## 2023-07-20 RX ADMIN — METRONIDAZOLE 500 MG: 250 TABLET ORAL at 05:38

## 2023-07-20 RX ADMIN — OXYCODONE AND ACETAMINOPHEN 2 TABLET: 5; 325 TABLET ORAL at 18:18

## 2023-07-20 RX ADMIN — METHOCARBAMOL 750 MG: 500 TABLET ORAL at 16:09

## 2023-07-20 RX ADMIN — CEFEPIME 2000 MG: 2 INJECTION, POWDER, FOR SOLUTION INTRAVENOUS at 15:20

## 2023-07-20 RX ADMIN — TRAZODONE HYDROCHLORIDE 50 MG: 50 TABLET ORAL at 20:41

## 2023-07-20 RX ADMIN — KETOROLAC TROMETHAMINE 30 MG: 30 INJECTION, SOLUTION INTRAMUSCULAR; INTRAVENOUS at 12:45

## 2023-07-20 RX ADMIN — MORPHINE SULFATE 2 MG: 2 INJECTION, SOLUTION INTRAMUSCULAR; INTRAVENOUS at 02:30

## 2023-07-20 RX ADMIN — ENOXAPARIN SODIUM 40 MG: 100 INJECTION SUBCUTANEOUS at 08:14

## 2023-07-20 RX ADMIN — METHOCARBAMOL 750 MG: 500 TABLET ORAL at 02:33

## 2023-07-20 RX ADMIN — GABAPENTIN 300 MG: 300 CAPSULE ORAL at 18:18

## 2023-07-20 RX ADMIN — DICYCLOMINE HYDROCHLORIDE 20 MG: 20 TABLET ORAL at 16:10

## 2023-07-20 RX ADMIN — HYDROMORPHONE HYDROCHLORIDE 1 MG: 1 INJECTION, SOLUTION INTRAMUSCULAR; INTRAVENOUS; SUBCUTANEOUS at 16:09

## 2023-07-20 RX ADMIN — SODIUM CHLORIDE: 9 INJECTION, SOLUTION INTRAVENOUS at 00:26

## 2023-07-20 RX ADMIN — OXYCODONE AND ACETAMINOPHEN 2 TABLET: 5; 325 TABLET ORAL at 08:14

## 2023-07-20 RX ADMIN — OXYCODONE AND ACETAMINOPHEN 2 TABLET: 5; 325 TABLET ORAL at 12:45

## 2023-07-20 RX ADMIN — HYDROMORPHONE HYDROCHLORIDE 1 MG: 1 INJECTION, SOLUTION INTRAMUSCULAR; INTRAVENOUS; SUBCUTANEOUS at 10:08

## 2023-07-20 RX ADMIN — HYDROMORPHONE HYDROCHLORIDE 1 MG: 1 INJECTION, SOLUTION INTRAMUSCULAR; INTRAVENOUS; SUBCUTANEOUS at 05:38

## 2023-07-20 RX ADMIN — HYDROMORPHONE HYDROCHLORIDE 1 MG: 1 INJECTION, SOLUTION INTRAMUSCULAR; INTRAVENOUS; SUBCUTANEOUS at 00:13

## 2023-07-20 RX ADMIN — DICYCLOMINE HYDROCHLORIDE 20 MG: 20 TABLET ORAL at 08:18

## 2023-07-20 RX ADMIN — METRONIDAZOLE 500 MG: 250 TABLET ORAL at 22:27

## 2023-07-20 RX ADMIN — SODIUM CHLORIDE, PRESERVATIVE FREE 10 ML: 5 INJECTION INTRAVENOUS at 20:42

## 2023-07-20 RX ADMIN — VANCOMYCIN HYDROCHLORIDE 1000 MG: 1 INJECTION, POWDER, LYOPHILIZED, FOR SOLUTION INTRAVENOUS at 12:53

## 2023-07-20 RX ADMIN — GABAPENTIN 300 MG: 300 CAPSULE ORAL at 10:07

## 2023-07-20 RX ADMIN — HYDROXYZINE PAMOATE 50 MG: 25 CAPSULE ORAL at 00:14

## 2023-07-20 RX ADMIN — OXYCODONE AND ACETAMINOPHEN 2 TABLET: 5; 325 TABLET ORAL at 22:27

## 2023-07-20 RX ADMIN — CEFEPIME 2000 MG: 2 INJECTION, POWDER, FOR SOLUTION INTRAVENOUS at 03:43

## 2023-07-20 ASSESSMENT — PAIN SCALES - WONG BAKER
WONGBAKER_NUMERICALRESPONSE: 2

## 2023-07-20 ASSESSMENT — PAIN SCALES - GENERAL
PAINLEVEL_OUTOF10: 10
PAINLEVEL_OUTOF10: 7
PAINLEVEL_OUTOF10: 8
PAINLEVEL_OUTOF10: 2
PAINLEVEL_OUTOF10: 10
PAINLEVEL_OUTOF10: 5
PAINLEVEL_OUTOF10: 7
PAINLEVEL_OUTOF10: 6
PAINLEVEL_OUTOF10: 7
PAINLEVEL_OUTOF10: 2
PAINLEVEL_OUTOF10: 9
PAINLEVEL_OUTOF10: 10
PAINLEVEL_OUTOF10: 9

## 2023-07-20 ASSESSMENT — PAIN - FUNCTIONAL ASSESSMENT

## 2023-07-20 ASSESSMENT — PAIN DESCRIPTION - DESCRIPTORS
DESCRIPTORS: ACHING;SHARP
DESCRIPTORS: SHARP
DESCRIPTORS: ACHING;THROBBING
DESCRIPTORS: SHARP
DESCRIPTORS: SHARP;SPASM
DESCRIPTORS: ACHING
DESCRIPTORS: ACHING;THROBBING
DESCRIPTORS: ACHING;SPASM;SHARP

## 2023-07-20 ASSESSMENT — PAIN DESCRIPTION - LOCATION
LOCATION: LEG

## 2023-07-20 ASSESSMENT — PAIN DESCRIPTION - ORIENTATION
ORIENTATION: RIGHT;LEFT
ORIENTATION: LEFT
ORIENTATION: LEFT;RIGHT
ORIENTATION: RIGHT;LEFT

## 2023-07-20 NOTE — PROGRESS NOTES
Resting comfortably this morning. I did not wake her. Polymicrobial growth on surgical culture, blood cultures negative thus far. Plan for VAC change tomorrow--may be ready for discharge after that from a surgical standpoint.     Electronically signed by Rachelle Salcido MD on 7/20/2023 at 8:40 AM

## 2023-07-20 NOTE — CARE COORDINATION
CM in to see pt to follow up on discharge planning. Plan remains to return to her [facility] with her . Pt continues to refuse SNF placement. Pt denies any needs at this time.   CM following

## 2023-07-20 NOTE — PROGRESS NOTES
Infectious Disease Progress Note  2023   Patient Name: Ernesto Patel : 1980   Impression  Polymicrobial BLE Cellulitis and Abscesses s/p I&D:  Tenosynovitis Left Midfoot:  Secondary to IVDU:  No known allergies to ABX  CrCl 108  Clinically has improved, afebrile with no leukocytosis. -BC 0/2 NGTD   -HIV and RPR negative  -CT Tibia/Fibula Left W Contrast: 1. Cellulitis of the leg worse in the ankle. Anterior ankle superficial soft   tissue 5 x 1 x 3.5 cm fluid collection concerning for abscess. 2. 3 x 2 x 0.5 cm area of fluid concerning for tenosynovitis in the extensor   digitorum longus tendon sheath at the level of the midfoot. -S/p per Dr. Annye Councilman: Bilateral leg I&D, left foot debridement I&D with wound VAC placement. DX:  bilateral leg abscesses and left foot abscess. Cultures: Prelim: Staph aureus, Enterobacter cloacae, Streptococcus constellatus. Ortho has been consulted to eval possible tenosynovitis.    PWID (IV heroin and crack cocaine since age 19)/ Marijuana Use/ Tobacco Abuse:  Reports last heroin use at  at noon  -Urine and Blood Drug screens: positive for amphetamine, cocaine, benzodiazepines, marijuana, fentanyl, opiates and oxycodone  Chronic Hepatitis C:  3/10/2023: Hep C Ab reactive, Hep A total Ab negative, Hep B Ab 987, Hep B Core Total Ab positive  Homelessness:  Multi-morbidity: per PMHx:  past endocarditis, h/o right shoulder OM     Plan:  Continue IV cefepime 1 gm q8h  Continue po Flagyl 500 mg tid  Continue IV vancomycin per pharmacy dosing  DW primary nurse, If patient tries to leave AMA, please transition to po therapy cumulative 14 days from OR procedure: po Cipro 750 mg bid x 7 days and po Flagy 500 mg tid x 7 days  Will try to transition to oral ABX for DC, collaborate with Dr. Britt Moreira for DC   Follow up with ID clinic in 1 week please (around 2023)  Weekly labs drawn on Monday during the course of treatment  CBC with

## 2023-07-20 NOTE — PROGRESS NOTES
she's doing\"   -Consulted Case Management due to Homelessness and Drug Use   -UDS + cocaine, amphetamines, benzos, cannabinoids, fentanyl, opiates       Diet ADULT DIET; Regular   DVT Prophylaxis [] Lovenox, []  Heparin, [] SCDs, [] Ambulation,  [] Eliquis, [] Xarelto  [] Coumadin   Code Status Full Code   Disposition From: Car-Homeless  Expected Disposition: Same   Estimated Date of Discharge: 2-3 days   Patient requires continued admission due to ongoing Cellulitis treatment with IV antibiotics, Surgery and ID following also    Surrogate Decision Maker/ POA Self      Personally reviewed Lab Studies and Imaging     Discussed management of the case with Dr. Margot Yost who agrees with above assessment and plan. Drugs that require monitoring for toxicity include Vancomycin and the method of monitoring was daily labs and Pharmacy Consult. Subjective:     Chief Complaint: Mulugeta Hennessy is a 44 y.o. female who presented to  emergency department with concern for infection and swelling in both lower legs for the past 4 to 5 days. Patient reports a subjective fever at home. Denies vomiting. States both legs are very painful to the point where she has difficulty walking. She does have a history of IV drug use and admits she has been injecting into her legs. States she has been using fentanyl for about 12 years, typically injecting once in the morning and once in the afternoon. Also uses crack cocaine. No other complaints of chest discomfort, shortness of breath or abdominal pain. On exam today she his groggy and states her lower leg pain is not much better. Review of Systems:      Pertinent positives and negatives discussed in HPI    Objective:      Intake/Output Summary (Last 24 hours) at 7/20/2023 0818  Last data filed at 7/20/2023 0544  Gross per 24 hour   Intake 360 ml   Output 1601 ml   Net -1241 ml          Vitals:   Vitals:    07/20/23 0817   BP: 124/79   Pulse: 80   Resp: 16

## 2023-07-21 VITALS
BODY MASS INDEX: 29.43 KG/M2 | OXYGEN SATURATION: 100 % | TEMPERATURE: 98.2 F | RESPIRATION RATE: 16 BRPM | HEART RATE: 68 BPM | HEIGHT: 61 IN | DIASTOLIC BLOOD PRESSURE: 88 MMHG | WEIGHT: 155.9 LBS | SYSTOLIC BLOOD PRESSURE: 141 MMHG

## 2023-07-21 LAB
ANION GAP SERPL CALCULATED.3IONS-SCNC: 10 MMOL/L (ref 4–16)
BASOPHILS ABSOLUTE: 0 K/CU MM
BASOPHILS RELATIVE PERCENT: 0.5 % (ref 0–1)
BUN SERPL-MCNC: 10 MG/DL (ref 6–23)
CALCIUM SERPL-MCNC: 8 MG/DL (ref 8.3–10.6)
CHLORIDE BLD-SCNC: 109 MMOL/L (ref 99–110)
CO2: 22 MMOL/L (ref 21–32)
CREAT SERPL-MCNC: 0.7 MG/DL (ref 0.6–1.1)
CULTURE: ABNORMAL
DIFFERENTIAL TYPE: ABNORMAL
DOSE AMOUNT: NORMAL
DOSE TIME: NORMAL
EOSINOPHILS ABSOLUTE: 0.2 K/CU MM
EOSINOPHILS RELATIVE PERCENT: 2.9 % (ref 0–3)
GFR SERPL CREATININE-BSD FRML MDRD: >60 ML/MIN/1.73M2
GLUCOSE SERPL-MCNC: 93 MG/DL (ref 70–99)
HCT VFR BLD CALC: 34.8 % (ref 37–47)
HEMOGLOBIN: 10.9 GM/DL (ref 12.5–16)
IMMATURE NEUTROPHIL %: 0.3 % (ref 0–0.43)
LYMPHOCYTES ABSOLUTE: 0.9 K/CU MM
LYMPHOCYTES RELATIVE PERCENT: 15.4 % (ref 24–44)
Lab: ABNORMAL
MCH RBC QN AUTO: 28.5 PG (ref 27–31)
MCHC RBC AUTO-ENTMCNC: 31.3 % (ref 32–36)
MCV RBC AUTO: 90.9 FL (ref 78–100)
MONOCYTES ABSOLUTE: 0.3 K/CU MM
MONOCYTES RELATIVE PERCENT: 4.9 % (ref 0–4)
NUCLEATED RBC %: 0 %
PDW BLD-RTO: 12.8 % (ref 11.7–14.9)
PLATELET # BLD: 235 K/CU MM (ref 140–440)
PMV BLD AUTO: 9.8 FL (ref 7.5–11.1)
POTASSIUM SERPL-SCNC: 4.1 MMOL/L (ref 3.5–5.1)
RBC # BLD: 3.83 M/CU MM (ref 4.2–5.4)
SEGMENTED NEUTROPHILS ABSOLUTE COUNT: 4.5 K/CU MM
SEGMENTED NEUTROPHILS RELATIVE PERCENT: 76 % (ref 36–66)
SODIUM BLD-SCNC: 141 MMOL/L (ref 135–145)
SPECIMEN: ABNORMAL
TOTAL IMMATURE NEUTOROPHIL: 0.02 K/CU MM
TOTAL NUCLEATED RBC: 0 K/CU MM
VANCOMYCIN RANDOM: 31.8 UG/ML
WBC # BLD: 5.9 K/CU MM (ref 4–10.5)

## 2023-07-21 PROCEDURE — 6370000000 HC RX 637 (ALT 250 FOR IP): Performed by: NURSE PRACTITIONER

## 2023-07-21 PROCEDURE — 99232 SBSQ HOSP IP/OBS MODERATE 35: CPT | Performed by: NURSE PRACTITIONER

## 2023-07-21 PROCEDURE — 6360000002 HC RX W HCPCS: Performed by: NURSE PRACTITIONER

## 2023-07-21 PROCEDURE — 6360000002 HC RX W HCPCS: Performed by: SURGERY

## 2023-07-21 PROCEDURE — 2580000003 HC RX 258: Performed by: SURGERY

## 2023-07-21 PROCEDURE — 2580000003 HC RX 258: Performed by: NURSE PRACTITIONER

## 2023-07-21 PROCEDURE — 85025 COMPLETE CBC W/AUTO DIFF WBC: CPT

## 2023-07-21 PROCEDURE — 99211 OFF/OP EST MAY X REQ PHY/QHP: CPT

## 2023-07-21 PROCEDURE — 36415 COLL VENOUS BLD VENIPUNCTURE: CPT

## 2023-07-21 PROCEDURE — 99024 POSTOP FOLLOW-UP VISIT: CPT | Performed by: SURGERY

## 2023-07-21 PROCEDURE — 80202 ASSAY OF VANCOMYCIN: CPT

## 2023-07-21 PROCEDURE — 94761 N-INVAS EAR/PLS OXIMETRY MLT: CPT

## 2023-07-21 PROCEDURE — 6370000000 HC RX 637 (ALT 250 FOR IP): Performed by: SURGERY

## 2023-07-21 PROCEDURE — 80048 BASIC METABOLIC PNL TOTAL CA: CPT

## 2023-07-21 RX ORDER — LEVOFLOXACIN 500 MG/1
750 TABLET, FILM COATED ORAL DAILY
Status: DISCONTINUED | OUTPATIENT
Start: 2023-07-21 | End: 2023-07-21 | Stop reason: HOSPADM

## 2023-07-21 RX ORDER — OXYCODONE HYDROCHLORIDE AND ACETAMINOPHEN 5; 325 MG/1; MG/1
1 TABLET ORAL EVERY 4 HOURS PRN
Qty: 12 TABLET | Refills: 0 | Status: SHIPPED | OUTPATIENT
Start: 2023-07-21 | End: 2023-07-24

## 2023-07-21 RX ORDER — LEVOFLOXACIN 750 MG/1
750 TABLET ORAL DAILY
Qty: 7 TABLET | Refills: 0 | Status: SHIPPED | OUTPATIENT
Start: 2023-07-21 | End: 2023-07-28

## 2023-07-21 RX ORDER — HYDROXYZINE PAMOATE 50 MG/1
50 CAPSULE ORAL EVERY 8 HOURS PRN
Qty: 60 CAPSULE | Refills: 3 | Status: SHIPPED | OUTPATIENT
Start: 2023-07-21 | End: 2023-10-19

## 2023-07-21 RX ORDER — METRONIDAZOLE 500 MG/1
500 TABLET ORAL EVERY 8 HOURS SCHEDULED
Qty: 21 TABLET | Refills: 0 | Status: SHIPPED | OUTPATIENT
Start: 2023-07-21 | End: 2023-07-28

## 2023-07-21 RX ORDER — GABAPENTIN 300 MG/1
300 CAPSULE ORAL EVERY 8 HOURS PRN
Qty: 60 CAPSULE | Refills: 3 | Status: SHIPPED | OUTPATIENT
Start: 2023-07-21 | End: 2023-10-19

## 2023-07-21 RX ADMIN — METHOCARBAMOL 750 MG: 500 TABLET ORAL at 05:11

## 2023-07-21 RX ADMIN — OXYCODONE AND ACETAMINOPHEN 2 TABLET: 5; 325 TABLET ORAL at 07:44

## 2023-07-21 RX ADMIN — ONDANSETRON 4 MG: 2 INJECTION INTRAMUSCULAR; INTRAVENOUS at 07:44

## 2023-07-21 RX ADMIN — METRONIDAZOLE 500 MG: 250 TABLET ORAL at 05:11

## 2023-07-21 RX ADMIN — LEVOFLOXACIN 750 MG: 500 TABLET, FILM COATED ORAL at 13:00

## 2023-07-21 RX ADMIN — HYDROMORPHONE HYDROCHLORIDE 1 MG: 1 INJECTION, SOLUTION INTRAMUSCULAR; INTRAVENOUS; SUBCUTANEOUS at 05:11

## 2023-07-21 RX ADMIN — DICYCLOMINE HYDROCHLORIDE 20 MG: 20 TABLET ORAL at 07:44

## 2023-07-21 RX ADMIN — VANCOMYCIN HYDROCHLORIDE 1000 MG: 1 INJECTION, POWDER, LYOPHILIZED, FOR SOLUTION INTRAVENOUS at 00:23

## 2023-07-21 RX ADMIN — KETOROLAC TROMETHAMINE 30 MG: 30 INJECTION, SOLUTION INTRAMUSCULAR; INTRAVENOUS at 10:29

## 2023-07-21 RX ADMIN — SODIUM CHLORIDE, PRESERVATIVE FREE 10 ML: 5 INJECTION INTRAVENOUS at 07:44

## 2023-07-21 RX ADMIN — GABAPENTIN 300 MG: 300 CAPSULE ORAL at 07:44

## 2023-07-21 RX ADMIN — HYDROMORPHONE HYDROCHLORIDE 1 MG: 1 INJECTION, SOLUTION INTRAMUSCULAR; INTRAVENOUS; SUBCUTANEOUS at 01:26

## 2023-07-21 RX ADMIN — HYDROMORPHONE HYDROCHLORIDE 1 MG: 1 INJECTION, SOLUTION INTRAMUSCULAR; INTRAVENOUS; SUBCUTANEOUS at 08:32

## 2023-07-21 RX ADMIN — OXYCODONE AND ACETAMINOPHEN 2 TABLET: 5; 325 TABLET ORAL at 13:00

## 2023-07-21 RX ADMIN — CEFEPIME 2000 MG: 2 INJECTION, POWDER, FOR SOLUTION INTRAVENOUS at 03:36

## 2023-07-21 ASSESSMENT — PAIN DESCRIPTION - DESCRIPTORS
DESCRIPTORS: SHARP
DESCRIPTORS: ACHING;SHARP
DESCRIPTORS: ACHING;SHARP
DESCRIPTORS: SHARP

## 2023-07-21 ASSESSMENT — PAIN DESCRIPTION - ORIENTATION
ORIENTATION: RIGHT;LEFT
ORIENTATION: LEFT
ORIENTATION: LEFT

## 2023-07-21 ASSESSMENT — PAIN DESCRIPTION - LOCATION
LOCATION: LEG

## 2023-07-21 ASSESSMENT — PAIN SCALES - GENERAL
PAINLEVEL_OUTOF10: 9
PAINLEVEL_OUTOF10: 10
PAINLEVEL_OUTOF10: 7
PAINLEVEL_OUTOF10: 9

## 2023-07-21 ASSESSMENT — PAIN - FUNCTIONAL ASSESSMENT
PAIN_FUNCTIONAL_ASSESSMENT: PREVENTS OR INTERFERES SOME ACTIVE ACTIVITIES AND ADLS
PAIN_FUNCTIONAL_ASSESSMENT: ACTIVITIES ARE NOT PREVENTED
PAIN_FUNCTIONAL_ASSESSMENT: PREVENTS OR INTERFERES SOME ACTIVE ACTIVITIES AND ADLS
PAIN_FUNCTIONAL_ASSESSMENT: PREVENTS OR INTERFERES SOME ACTIVE ACTIVITIES AND ADLS

## 2023-07-21 NOTE — CONSULTS
Measurements:  Negative Pressure Wound Therapy Foot Left (Active)   Wound Type Surgical 07/21/23 0753   Unit Type kci 07/19/23 0914   Dressing Type Black Foam 07/19/23 0914   Number of pieces used 4 07/19/23 0914   Number of pieces removed 3 07/19/23 0914   Cycle Continuous 07/21/23 0753   Target Pressure (mmHg) 125 07/21/23 0753   Canister changed? No 07/19/23 0914   Dressing Status Clean, dry & intact 07/21/23 0753   Dressing Changed Changed/New 07/19/23 0914   Drainage Amount None 07/21/23 0753   Drainage Description Serosanguinous 07/19/23 0914   Dressing Change Due 07/21/23 07/19/23 0914   Output (ml) 10 ml 07/18/23 0821   Wound Assessment Other (Comment) 07/20/23 0442   Janet-wound Assessment Other (Comment) 07/20/23 0442   Odor None 07/19/23 0914   Number of days: 3       Wound 08/29/18 #1 right medial ankle(onset april 2018) trauma (Active)   Number of days: 1786       Incision 02/27/15 Shoulder Right (Active)   Number of days: 3065       Wound 07/19/23 Pretibial Right;Medial (Active)   Wound Image   07/19/23 0914   Wound Etiology Surgical 07/21/23 0830   Dressing Status New dressing applied 07/21/23 0830   Wound Cleansed Cleansed with saline 07/21/23 0830   Dressing/Treatment Moist to dry;ABD;Roll gauze; Ace wrap 07/21/23 0830   Wound Length (cm) 1.3 cm 07/19/23 0914   Wound Width (cm) 1.5 cm 07/19/23 0914   Wound Depth (cm) 1.5 cm 07/19/23 0914   Wound Surface Area (cm^2) 1.95 cm^2 07/19/23 0914   Wound Volume (cm^3) 2.925 cm^3 07/19/23 0914   Distance Tunneling (cm) 0 cm 07/21/23 0830   Tunneling Position ___ O'Clock 0 07/21/23 0830   Undermining Starts ___ O'Clock 0 07/21/23 0830   Undermining Ends___ O'Clock 0 07/21/23 0830   Undermining Maxium Distance (cm) 0 07/21/23 0830   Wound Assessment Pink/red 07/21/23 0830   Drainage Amount Moderate 07/21/23 0830   Drainage Description Serosanguinous 07/21/23 0830   Odor None 07/21/23 0830   Janet-wound Assessment Intact 07/21/23 0830   Margins Defined edges Undermining Ends___ O'Clock 0 07/21/23 0830   Undermining Maxium Distance (cm) 0 07/21/23 0830   Wound Assessment Pink/red 07/21/23 0830   Drainage Amount Moderate 07/21/23 0830   Drainage Description Serosanguinous 07/21/23 0830   Odor None 07/21/23 0830   Janet-wound Assessment Intact 07/21/23 0830   Margins Defined edges 07/21/23 0830   Wound Thickness Description not for Pressure Injury Full thickness 07/21/23 0830   Number of days: 1       Response to treatment:  With complaints of pain. Pain Assessment:  Severity:  severe-10  Quality of pain: sore  Wound Pain Timing/Severity: wound care  Premedicated: yes    Plan:     Plan of Care: Wound 07/19/23 Pretibial Right;Medial-Dressing/Treatment: Moist to dry, ABD, Roll gauze, Ace wrap  Wound 07/19/23 Pretibial Distal;Left; Anterior-Dressing/Treatment: Iodoform gauze, Moist to dry, ABD, Roll gauze (1/4in iodoform)  Wound 07/19/23 Ankle Left; Anterior-Dressing/Treatment: Moist to dry, ABD, Roll gauze, Ace wrap    Patient in bed Dr Franki Beyer at bedside to eval leg wounds. Left leg with wound vac dressing. Removed dressings from bilateral legs. Cleansed with NS. Pt getting upset with her pain said she is never going to use IV drugs again. Pt was medicated for pain prior to dressing change. Pt talking on the phone during dressing change to distract from her pain. Pt to go home lives in a [place] with her  refused to go to a SNF. Would benefit from continued wound vac cannot get with her living situation. Wound vac D/C per Dr Franki Beyer. Left proximal leg with tunneled area with purulent drainage packed with 1/4in iodoform and ns moist gauze educated patient importance of daily dressing change and f/u with the wound clinic and Dr Maribel Peralta office. Applied new dressing ns moist to rt leg and left ankle wound. Applied ace wrap from base of the toes to the knee. Pt is generally not at risk for skin breakdown AEB zulema.     Specialty Bed Required : no  [] Low Air Loss

## 2023-07-21 NOTE — PROGRESS NOTES
Infectious Disease Progress Note  2023   Patient Name: Melchor Gunn : 1980   Impression  Polymicrobial BLE Cellulitis and Abscesses s/p I&D:  Tenosynovitis Left Midfoot:  Secondary to IVDU:  No known allergies to ABX  CrCl 94  Clinically has improved, afebrile with no leukocytosis. -BC 0/2 NGTD   -HIV and RPR negative  -CT Tibia/Fibula Left W Contrast: 1. Cellulitis of the leg worse in the ankle. Anterior ankle superficial soft   tissue 5 x 1 x 3.5 cm fluid collection concerning for abscess. 2. 3 x 2 x 0.5 cm area of fluid concerning for tenosynovitis in the extensor   digitorum longus tendon sheath at the level of the midfoot. -S/p per Dr. Ramirez Child: Bilateral leg I&D, left foot debridement I&D with wound VAC placement. DX:  bilateral leg abscesses and left foot abscess. Cultures: Staph aureus, Enterobacter cloacae, Streptococcus constellatus. Ortho has been consulted to eval possible tenosynovitis. PWID (IV heroin and crack cocaine since age 19)/ Marijuana Use/ Tobacco Abuse:  Reports last heroin use at  at noon  -Urine and Blood Drug screens: positive for amphetamine, cocaine, benzodiazepines, marijuana, fentanyl, opiates and oxycodone  Chronic Hepatitis C:  3/10/2023: Hep C Ab reactive, Hep A total Ab negative, Hep B Ab 987, Hep B Core Total Ab positive  Homelessness:  Multi-morbidity: per PMHx:  past endocarditis, h/o right shoulder OM     Plan:  DC cefepime and vancomycin  Continue po Flagyl 500 mg tid x 7 more days (end date 2023)  Start po Levaquin 750 mg q24h x 7 days (end date 2023)  Follow up with ID clinic in 1 week please (around 2023), no lab work requested prior to visit  OK from ID standpoint to DC when ready    Ongoing Antimicrobial Therapy  Flagyl , -  Levaquin -? Completed Antimicrobial Therapy  Cefazolin ? Ceftriaxone ? Vancomycin -  Cefepime -    History:? Interval history noted.   Chief signed by: Electronically signed by Edis Simon.  REMY Perry CNP on 7/21/2023 at 10:44 AM

## 2023-07-21 NOTE — PROGRESS NOTES
Outpatient Pharmacy Progress Note for Meds-to-Beds    Total number of Prescriptions Filled: 5  The following medications were dispensed to the patient during the discharge process:  Gabapentin  Hydroxyzine deirdre  Levofloxacin  Metronidazole  Oxycodone/ace    Additional Documentation:  Medication(s) were delivered to the patient's room prior to discharge      Thank you for letting us serve your patients.   [address], [address] [place] [address]    Phone: [phone]    Fax: [phone]

## 2023-07-22 LAB
HCV RNA SERPL NAA+PROBE-ACNC: ABNORMAL IU/ML
HCV RNA SERPL NAA+PROBE-LOG IU: 5.36 LOG IU/ML
HCV RNA SERPL QL NAA+PROBE: DETECTED

## 2023-07-23 LAB
CULTURE: NORMAL
CULTURE: NORMAL
Lab: NORMAL
Lab: NORMAL
SPECIMEN: NORMAL
SPECIMEN: NORMAL

## 2023-07-24 LAB
CULTURE: ABNORMAL
Lab: ABNORMAL
SPECIMEN: ABNORMAL

## 2023-08-09 PROBLEM — B18.2 CHRONIC HEPATITIS C WITHOUT HEPATIC COMA (HCC): Status: ACTIVE | Noted: 2023-08-09

## 2024-02-06 ENCOUNTER — TELEPHONE (OUTPATIENT)
Dept: WOUND CARE | Age: 44
End: 2024-02-06

## 2024-12-22 ENCOUNTER — HOSPITAL ENCOUNTER (INPATIENT)
Age: 44
LOS: 1 days | Discharge: LEFT BEFORE DISCHARGE | DRG: 552 | End: 2024-12-22
Attending: STUDENT IN AN ORGANIZED HEALTH CARE EDUCATION/TRAINING PROGRAM | Admitting: STUDENT IN AN ORGANIZED HEALTH CARE EDUCATION/TRAINING PROGRAM
Payer: COMMERCIAL

## 2024-12-22 VITALS
TEMPERATURE: 97.7 F | HEART RATE: 70 BPM | SYSTOLIC BLOOD PRESSURE: 137 MMHG | BODY MASS INDEX: 32.47 KG/M2 | RESPIRATION RATE: 20 BRPM | OXYGEN SATURATION: 98 % | DIASTOLIC BLOOD PRESSURE: 101 MMHG | HEIGHT: 61 IN | WEIGHT: 171.96 LBS

## 2024-12-22 PROBLEM — M54.50 LOWER BACK PAIN: Status: ACTIVE | Noted: 2024-12-22

## 2024-12-22 LAB
ALBUMIN SERPL-MCNC: 3.4 G/DL (ref 3.4–5)
ALBUMIN/GLOB SERPL: 0.8 {RATIO} (ref 1.1–2.2)
ALP SERPL-CCNC: 72 U/L (ref 40–129)
ALT SERPL-CCNC: 16 U/L (ref 10–40)
ANION GAP SERPL CALCULATED.3IONS-SCNC: 10 MMOL/L (ref 9–17)
AST SERPL-CCNC: 25 U/L (ref 15–37)
BASOPHILS # BLD: 0.04 K/UL
BASOPHILS NFR BLD: 1 % (ref 0–1)
BILIRUB SERPL-MCNC: 0.3 MG/DL (ref 0–1)
BUN SERPL-MCNC: 24 MG/DL (ref 7–20)
CALCIUM SERPL-MCNC: 8.5 MG/DL (ref 8.3–10.6)
CHLORIDE SERPL-SCNC: 104 MMOL/L (ref 99–110)
CO2 SERPL-SCNC: 22 MMOL/L (ref 21–32)
CREAT SERPL-MCNC: 0.9 MG/DL (ref 0.6–1.1)
CRP SERPL HS-MCNC: 16.3 MG/L (ref 0–5)
EOSINOPHIL # BLD: 0.37 K/UL
EOSINOPHILS RELATIVE PERCENT: 5 % (ref 0–3)
ERYTHROCYTE [DISTWIDTH] IN BLOOD BY AUTOMATED COUNT: 13.8 % (ref 11.7–14.9)
ERYTHROCYTE [SEDIMENTATION RATE] IN BLOOD BY WESTERGREN METHOD: 87 MM/HR (ref 0–20)
GFR, ESTIMATED: 66 ML/MIN/1.73M2
GLUCOSE SERPL-MCNC: 99 MG/DL (ref 74–99)
HCT VFR BLD AUTO: 38.1 % (ref 37–47)
HGB BLD-MCNC: 11.8 G/DL (ref 12.5–16)
IMM GRANULOCYTES # BLD AUTO: 0.02 K/UL
IMM GRANULOCYTES NFR BLD: 0 %
LYMPHOCYTES NFR BLD: 1.41 K/UL
LYMPHOCYTES RELATIVE PERCENT: 17 % (ref 24–44)
MCH RBC QN AUTO: 28.7 PG (ref 27–31)
MCHC RBC AUTO-ENTMCNC: 31 G/DL (ref 32–36)
MCV RBC AUTO: 92.7 FL (ref 78–100)
MONOCYTES NFR BLD: 0.46 K/UL
MONOCYTES NFR BLD: 6 % (ref 0–4)
NEUTROPHILS NFR BLD: 72 % (ref 36–66)
NEUTS SEG NFR BLD: 5.8 K/UL
PLATELET # BLD AUTO: 246 K/UL (ref 140–440)
PMV BLD AUTO: 8.9 FL (ref 7.5–11.1)
POTASSIUM SERPL-SCNC: 4.4 MMOL/L (ref 3.5–5.1)
PROT SERPL-MCNC: 7.4 G/DL (ref 6.4–8.2)
RBC # BLD AUTO: 4.11 M/UL (ref 4.2–5.4)
SODIUM SERPL-SCNC: 135 MMOL/L (ref 136–145)
WBC OTHER # BLD: 8.1 K/UL (ref 4–10.5)

## 2024-12-22 PROCEDURE — 80053 COMPREHEN METABOLIC PANEL: CPT

## 2024-12-22 PROCEDURE — 85025 COMPLETE CBC W/AUTO DIFF WBC: CPT

## 2024-12-22 PROCEDURE — 85652 RBC SED RATE AUTOMATED: CPT

## 2024-12-22 PROCEDURE — 6370000000 HC RX 637 (ALT 250 FOR IP): Performed by: STUDENT IN AN ORGANIZED HEALTH CARE EDUCATION/TRAINING PROGRAM

## 2024-12-22 PROCEDURE — 6360000002 HC RX W HCPCS: Performed by: STUDENT IN AN ORGANIZED HEALTH CARE EDUCATION/TRAINING PROGRAM

## 2024-12-22 PROCEDURE — 86140 C-REACTIVE PROTEIN: CPT

## 2024-12-22 PROCEDURE — 36415 COLL VENOUS BLD VENIPUNCTURE: CPT

## 2024-12-22 PROCEDURE — 1200000000 HC SEMI PRIVATE

## 2024-12-22 RX ORDER — ONDANSETRON 2 MG/ML
4 INJECTION INTRAMUSCULAR; INTRAVENOUS EVERY 6 HOURS PRN
Status: DISCONTINUED | OUTPATIENT
Start: 2024-12-22 | End: 2024-12-22 | Stop reason: HOSPADM

## 2024-12-22 RX ORDER — METHADONE HYDROCHLORIDE 10 MG/ML
115 CONCENTRATE ORAL DAILY
Status: DISCONTINUED | OUTPATIENT
Start: 2024-12-22 | End: 2024-12-22 | Stop reason: HOSPADM

## 2024-12-22 RX ORDER — OXYCODONE HYDROCHLORIDE 5 MG/1
5 TABLET ORAL EVERY 6 HOURS PRN
Status: DISCONTINUED | OUTPATIENT
Start: 2024-12-22 | End: 2024-12-22 | Stop reason: HOSPADM

## 2024-12-22 RX ORDER — LIDOCAINE 4 G/G
1 PATCH TOPICAL DAILY
Status: DISCONTINUED | OUTPATIENT
Start: 2024-12-22 | End: 2024-12-22 | Stop reason: HOSPADM

## 2024-12-22 RX ORDER — METHADONE HYDROCHLORIDE 10 MG/ML
115 CONCENTRATE ORAL DAILY
Status: DISCONTINUED | OUTPATIENT
Start: 2024-12-22 | End: 2024-12-22

## 2024-12-22 RX ORDER — MORPHINE SULFATE 2 MG/ML
2 INJECTION, SOLUTION INTRAMUSCULAR; INTRAVENOUS EVERY 4 HOURS PRN
Status: DISCONTINUED | OUTPATIENT
Start: 2024-12-22 | End: 2024-12-22

## 2024-12-22 RX ORDER — MORPHINE SULFATE 2 MG/ML
2 INJECTION, SOLUTION INTRAMUSCULAR; INTRAVENOUS EVERY 4 HOURS PRN
Status: DISCONTINUED | OUTPATIENT
Start: 2024-12-22 | End: 2024-12-22 | Stop reason: HOSPADM

## 2024-12-22 RX ORDER — ACETAMINOPHEN 650 MG/1
650 SUPPOSITORY RECTAL EVERY 6 HOURS PRN
Status: DISCONTINUED | OUTPATIENT
Start: 2024-12-22 | End: 2024-12-22 | Stop reason: HOSPADM

## 2024-12-22 RX ORDER — METHOCARBAMOL 500 MG/1
500 TABLET, FILM COATED ORAL 3 TIMES DAILY
Status: DISCONTINUED | OUTPATIENT
Start: 2024-12-22 | End: 2024-12-22 | Stop reason: HOSPADM

## 2024-12-22 RX ORDER — ENOXAPARIN SODIUM 100 MG/ML
40 INJECTION SUBCUTANEOUS DAILY
Status: DISCONTINUED | OUTPATIENT
Start: 2024-12-22 | End: 2024-12-22 | Stop reason: HOSPADM

## 2024-12-22 RX ORDER — ACETAMINOPHEN 325 MG/1
650 TABLET ORAL EVERY 6 HOURS PRN
Status: DISCONTINUED | OUTPATIENT
Start: 2024-12-22 | End: 2024-12-22 | Stop reason: HOSPADM

## 2024-12-22 RX ORDER — NAPROXEN 250 MG/1
375 TABLET ORAL 2 TIMES DAILY WITH MEALS
Status: DISCONTINUED | OUTPATIENT
Start: 2024-12-22 | End: 2024-12-22 | Stop reason: HOSPADM

## 2024-12-22 RX ORDER — GABAPENTIN 100 MG/1
100 CAPSULE ORAL 2 TIMES DAILY
Status: DISCONTINUED | OUTPATIENT
Start: 2024-12-22 | End: 2024-12-22 | Stop reason: HOSPADM

## 2024-12-22 RX ADMIN — METHOCARBAMOL TABLETS 500 MG: 500 TABLET, COATED ORAL at 09:05

## 2024-12-22 RX ADMIN — MORPHINE SULFATE 2 MG: 2 INJECTION, SOLUTION INTRAMUSCULAR; INTRAVENOUS at 06:06

## 2024-12-22 RX ADMIN — OXYCODONE HYDROCHLORIDE 5 MG: 5 TABLET ORAL at 04:29

## 2024-12-22 RX ADMIN — NAPROXEN 375 MG: 250 TABLET ORAL at 09:05

## 2024-12-22 ASSESSMENT — PAIN - FUNCTIONAL ASSESSMENT: PAIN_FUNCTIONAL_ASSESSMENT: ACTIVITIES ARE NOT PREVENTED

## 2024-12-22 ASSESSMENT — PAIN DESCRIPTION - LOCATION
LOCATION: BACK

## 2024-12-22 ASSESSMENT — PAIN DESCRIPTION - ORIENTATION: ORIENTATION: RIGHT

## 2024-12-22 ASSESSMENT — PAIN DESCRIPTION - DESCRIPTORS: DESCRIPTORS: SHARP;DULL;ACHING

## 2024-12-22 ASSESSMENT — PAIN SCALES - GENERAL
PAINLEVEL_OUTOF10: 10

## 2024-12-22 NOTE — PROGRESS NOTES
Updated pt that this nurse needs to contact methadone facility today to get her methadone ordered. Pt became emotional as she stated that clinic is not open today. Attempted to get a set of VS. Pt refused to have BP taken on arm and agreed to have BP taken on leg. After BP cuff started to inflate pt began to act out and yelling. Additional staff arrived. Pt continued yelling stated she did not want this nurse to continue taking care of her and wanted this nurse out of the room. Jeanna Patel RN, assumed care.

## 2024-12-22 NOTE — H&P
shoulder surgery (Right); Leg Surgery (Bilateral, 3/7/2023); and Foot Debridement (Bilateral, 7/18/2023).  Allergies:   Allergies   Allergen Reactions    Latex Hives    Darvocet [Propoxyphene N-Acetaminophen] Hives    Asa [Aspirin] Hives    Other Hives     Darvocet     Propoxyphene Hives     Fam HX:family history includes Asthma in her maternal grandmother and mother; Cancer in her maternal grandmother; Heart Disease in her maternal grandmother and mother.  Soc HX:   Social History     Socioeconomic History    Marital status: Single     Spouse name: None    Number of children: None    Years of education: None    Highest education level: None   Tobacco Use    Smoking status: Every Day     Current packs/day: 1.00     Types: Cigarettes    Smokeless tobacco: Never   Vaping Use    Vaping status: Never Used   Substance and Sexual Activity    Alcohol use: No    Drug use: Yes     Types: IV    Sexual activity: Yes     Partners: Male   Social History Narrative    ** Merged History Encounter **          Social Determinants of Health     Food Insecurity: No Food Insecurity (12/22/2024)    Hunger Vital Sign     Worried About Running Out of Food in the Last Year: Never true     Ran Out of Food in the Last Year: Never true   Transportation Needs: No Transportation Needs (12/22/2024)    PRAPARE - Transportation     Lack of Transportation (Medical): No     Lack of Transportation (Non-Medical): No   Housing Stability: Unknown (12/22/2024)    Housing Stability Vital Sign     Unable to Pay for Housing in the Last Year: No     Homeless in the Last Year: No       Medications:   Medications:    enoxaparin  40 mg SubCUTAneous Daily    methocarbamol  500 mg Oral TID    lidocaine  1 patch TransDERmal Daily    gabapentin  100 mg Oral BID    naproxen  375 mg Oral BID WC    [Held by provider] methadone  115 mg Oral Daily      Infusions:   PRN Meds: acetaminophen, 650 mg, Q6H PRN   Or  acetaminophen, 650 mg, Q6H PRN  ondansetron, 4 mg, Q6H
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
## 2024-12-22 NOTE — PROGRESS NOTES
4 Eyes Skin Assessment     NAME:  Devi Marte  YOB: 1980  MEDICAL RECORD NUMBER:  [id number]    The patient is being assessed for  Admission    I agree that at least one RN has performed a thorough Head to Toe Skin Assessment on the patient. ALL assessment sites listed below have been assessed.      Areas assessed by both nurses:    Head, Face, Ears, Shoulders, Back, Chest, Arms, Elbows, Hands, Sacrum. Buttock, Coccyx, Ischium, and Legs. Feet and Heels        Does the Patient have a Wound? No noted wound(s)       Leo Prevention initiated by RN: No  Wound Care Orders initiated by RN: No    Pressure Injury (Stage 3,4, Unstageable, DTI, NWPT, and Complex wounds) if present, place Wound referral order by RN under : No    New Ostomies, if present place, Ostomy referral order under : No     Nurse 1 eSignature: Electronically signed by Traci Murdock RN on 12/22/24 at 2:55 AM EST    **SHARE this note so that the co-signing nurse can place an eSignature**    Nurse 2 eSignature: Electronically signed by Evelyn Melton RN on 12/22/24 at 4:50 AM EST
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
## 2025-01-03 ENCOUNTER — HOSPITAL ENCOUNTER (INPATIENT)
Age: 45
LOS: 3 days | Discharge: LEFT BEFORE DISCHARGE | DRG: 540 | End: 2025-01-06
Attending: EMERGENCY MEDICINE | Admitting: STUDENT IN AN ORGANIZED HEALTH CARE EDUCATION/TRAINING PROGRAM
Payer: COMMERCIAL

## 2025-01-03 DIAGNOSIS — M46.46 DISCITIS OF LUMBAR REGION: Primary | ICD-10-CM

## 2025-01-03 DIAGNOSIS — M46.26 OSTEOMYELITIS OF VERTEBRA OF LUMBAR REGION: ICD-10-CM

## 2025-01-03 DIAGNOSIS — D64.9 CHRONIC ANEMIA: ICD-10-CM

## 2025-01-03 DIAGNOSIS — M86.9 OSTEOMYELITIS, UNSPECIFIED SITE, UNSPECIFIED TYPE: ICD-10-CM

## 2025-01-03 LAB
ALBUMIN SERPL-MCNC: 3.4 G/DL (ref 3.4–5)
ALBUMIN/GLOB SERPL: 1 {RATIO} (ref 1.1–2.2)
ALP SERPL-CCNC: 63 U/L (ref 40–129)
ALT SERPL-CCNC: 18 U/L (ref 10–40)
ANION GAP SERPL CALCULATED.3IONS-SCNC: 8 MMOL/L (ref 9–17)
AST SERPL-CCNC: 26 U/L (ref 15–37)
BASOPHILS # BLD: 0.04 K/UL
BASOPHILS NFR BLD: 1 % (ref 0–1)
BILIRUB SERPL-MCNC: 0.3 MG/DL (ref 0–1)
BUN SERPL-MCNC: 18 MG/DL (ref 7–20)
CALCIUM SERPL-MCNC: 8.6 MG/DL (ref 8.3–10.6)
CHLORIDE SERPL-SCNC: 104 MMOL/L (ref 99–110)
CO2 SERPL-SCNC: 25 MMOL/L (ref 21–32)
CREAT SERPL-MCNC: 0.7 MG/DL (ref 0.6–1.1)
EOSINOPHIL # BLD: 0.37 K/UL
EOSINOPHILS RELATIVE PERCENT: 5 % (ref 0–3)
ERYTHROCYTE [DISTWIDTH] IN BLOOD BY AUTOMATED COUNT: 13.5 % (ref 11.7–14.9)
GFR, ESTIMATED: 88 ML/MIN/1.73M2
GLUCOSE SERPL-MCNC: 98 MG/DL (ref 74–99)
HCT VFR BLD AUTO: 32.9 % (ref 37–47)
HGB BLD-MCNC: 10.7 G/DL (ref 12.5–16)
IMM GRANULOCYTES # BLD AUTO: 0.02 K/UL
IMM GRANULOCYTES NFR BLD: 0 %
LACTATE BLDV-SCNC: 0.9 MMOL/L (ref 0.4–2)
LYMPHOCYTES NFR BLD: 1.38 K/UL
LYMPHOCYTES RELATIVE PERCENT: 18 % (ref 24–44)
MCH RBC QN AUTO: 29.2 PG (ref 27–31)
MCHC RBC AUTO-ENTMCNC: 32.5 G/DL (ref 32–36)
MCV RBC AUTO: 89.9 FL (ref 78–100)
MONOCYTES NFR BLD: 0.4 K/UL
MONOCYTES NFR BLD: 5 % (ref 0–4)
NEUTROPHILS NFR BLD: 71 % (ref 36–66)
NEUTS SEG NFR BLD: 5.37 K/UL
PLATELET # BLD AUTO: 237 K/UL (ref 140–440)
PMV BLD AUTO: 8.9 FL (ref 7.5–11.1)
POTASSIUM SERPL-SCNC: 3.9 MMOL/L (ref 3.5–5.1)
PROT SERPL-MCNC: 7 G/DL (ref 6.4–8.2)
RBC # BLD AUTO: 3.66 M/UL (ref 4.2–5.4)
SODIUM SERPL-SCNC: 136 MMOL/L (ref 136–145)
WBC OTHER # BLD: 7.6 K/UL (ref 4–10.5)

## 2025-01-03 PROCEDURE — 80053 COMPREHEN METABOLIC PANEL: CPT

## 2025-01-03 PROCEDURE — 2500000003 HC RX 250 WO HCPCS: Performed by: STUDENT IN AN ORGANIZED HEALTH CARE EDUCATION/TRAINING PROGRAM

## 2025-01-03 PROCEDURE — 2580000003 HC RX 258: Performed by: EMERGENCY MEDICINE

## 2025-01-03 PROCEDURE — 99285 EMERGENCY DEPT VISIT HI MDM: CPT

## 2025-01-03 PROCEDURE — 96365 THER/PROPH/DIAG IV INF INIT: CPT

## 2025-01-03 PROCEDURE — 6360000002 HC RX W HCPCS: Performed by: EMERGENCY MEDICINE

## 2025-01-03 PROCEDURE — 96375 TX/PRO/DX INJ NEW DRUG ADDON: CPT

## 2025-01-03 PROCEDURE — 1200000000 HC SEMI PRIVATE

## 2025-01-03 PROCEDURE — 83605 ASSAY OF LACTIC ACID: CPT

## 2025-01-03 PROCEDURE — 6370000000 HC RX 637 (ALT 250 FOR IP): Performed by: STUDENT IN AN ORGANIZED HEALTH CARE EDUCATION/TRAINING PROGRAM

## 2025-01-03 PROCEDURE — 85025 COMPLETE CBC W/AUTO DIFF WBC: CPT

## 2025-01-03 PROCEDURE — 87040 BLOOD CULTURE FOR BACTERIA: CPT

## 2025-01-03 PROCEDURE — 96376 TX/PRO/DX INJ SAME DRUG ADON: CPT

## 2025-01-03 RX ORDER — SODIUM CHLORIDE 0.9 % (FLUSH) 0.9 %
5-40 SYRINGE (ML) INJECTION PRN
Status: DISCONTINUED | OUTPATIENT
Start: 2025-01-03 | End: 2025-01-06 | Stop reason: HOSPADM

## 2025-01-03 RX ORDER — SODIUM CHLORIDE 9 MG/ML
INJECTION, SOLUTION INTRAVENOUS PRN
Status: DISCONTINUED | OUTPATIENT
Start: 2025-01-03 | End: 2025-01-06 | Stop reason: HOSPADM

## 2025-01-03 RX ORDER — ACETAMINOPHEN 650 MG/1
650 SUPPOSITORY RECTAL EVERY 6 HOURS PRN
Status: DISCONTINUED | OUTPATIENT
Start: 2025-01-03 | End: 2025-01-06 | Stop reason: HOSPADM

## 2025-01-03 RX ORDER — NICOTINE 21 MG/24HR
1 PATCH, TRANSDERMAL 24 HOURS TRANSDERMAL DAILY
Status: DISCONTINUED | OUTPATIENT
Start: 2025-01-03 | End: 2025-01-06 | Stop reason: HOSPADM

## 2025-01-03 RX ORDER — ONDANSETRON 4 MG/1
4 TABLET, ORALLY DISINTEGRATING ORAL EVERY 8 HOURS PRN
Status: DISCONTINUED | OUTPATIENT
Start: 2025-01-03 | End: 2025-01-06 | Stop reason: HOSPADM

## 2025-01-03 RX ORDER — MORPHINE SULFATE 4 MG/ML
4 INJECTION, SOLUTION INTRAMUSCULAR; INTRAVENOUS ONCE
Status: COMPLETED | OUTPATIENT
Start: 2025-01-03 | End: 2025-01-03

## 2025-01-03 RX ORDER — SODIUM CHLORIDE 0.9 % (FLUSH) 0.9 %
5-40 SYRINGE (ML) INJECTION EVERY 12 HOURS SCHEDULED
Status: DISCONTINUED | OUTPATIENT
Start: 2025-01-03 | End: 2025-01-06 | Stop reason: HOSPADM

## 2025-01-03 RX ORDER — SODIUM CHLORIDE, SODIUM LACTATE, POTASSIUM CHLORIDE, CALCIUM CHLORIDE 600; 310; 30; 20 MG/100ML; MG/100ML; MG/100ML; MG/100ML
INJECTION, SOLUTION INTRAVENOUS CONTINUOUS
Status: DISCONTINUED | OUTPATIENT
Start: 2025-01-03 | End: 2025-01-06 | Stop reason: HOSPADM

## 2025-01-03 RX ORDER — HYDROMORPHONE HYDROCHLORIDE 1 MG/ML
0.5 INJECTION, SOLUTION INTRAMUSCULAR; INTRAVENOUS; SUBCUTANEOUS EVERY 4 HOURS PRN
Status: DISCONTINUED | OUTPATIENT
Start: 2025-01-03 | End: 2025-01-06 | Stop reason: HOSPADM

## 2025-01-03 RX ORDER — POTASSIUM CHLORIDE 1500 MG/1
40 TABLET, EXTENDED RELEASE ORAL PRN
Status: DISCONTINUED | OUTPATIENT
Start: 2025-01-03 | End: 2025-01-06 | Stop reason: HOSPADM

## 2025-01-03 RX ORDER — ONDANSETRON 2 MG/ML
4 INJECTION INTRAMUSCULAR; INTRAVENOUS EVERY 6 HOURS PRN
Status: DISCONTINUED | OUTPATIENT
Start: 2025-01-03 | End: 2025-01-06 | Stop reason: HOSPADM

## 2025-01-03 RX ORDER — MELOXICAM 15 MG/1
15 TABLET ORAL DAILY
COMMUNITY
Start: 2024-12-28 | End: 2025-01-27

## 2025-01-03 RX ORDER — MAGNESIUM SULFATE IN WATER 40 MG/ML
2000 INJECTION, SOLUTION INTRAVENOUS PRN
Status: DISCONTINUED | OUTPATIENT
Start: 2025-01-03 | End: 2025-01-06 | Stop reason: HOSPADM

## 2025-01-03 RX ORDER — NICOTINE 21 MG/24HR
1 PATCH, TRANSDERMAL 24 HOURS TRANSDERMAL DAILY
COMMUNITY
Start: 2024-12-29 | End: 2025-01-28

## 2025-01-03 RX ORDER — POTASSIUM CHLORIDE 7.45 MG/ML
10 INJECTION INTRAVENOUS PRN
Status: DISCONTINUED | OUTPATIENT
Start: 2025-01-03 | End: 2025-01-06 | Stop reason: HOSPADM

## 2025-01-03 RX ORDER — VANCOMYCIN 1.5 G/300ML
1500 INJECTION, SOLUTION INTRAVENOUS EVERY 12 HOURS
Status: DISCONTINUED | OUTPATIENT
Start: 2025-01-04 | End: 2025-01-04

## 2025-01-03 RX ORDER — POLYETHYLENE GLYCOL 3350 17 G/17G
17 POWDER, FOR SOLUTION ORAL DAILY PRN
Status: DISCONTINUED | OUTPATIENT
Start: 2025-01-03 | End: 2025-01-06 | Stop reason: HOSPADM

## 2025-01-03 RX ORDER — ACETAMINOPHEN 500 MG
1000 TABLET ORAL 3 TIMES DAILY
COMMUNITY
Start: 2024-12-28 | End: 2025-01-17

## 2025-01-03 RX ORDER — ACETAMINOPHEN 325 MG/1
650 TABLET ORAL EVERY 6 HOURS PRN
Status: DISCONTINUED | OUTPATIENT
Start: 2025-01-03 | End: 2025-01-06 | Stop reason: HOSPADM

## 2025-01-03 RX ADMIN — CEFEPIME 2000 MG: 2 INJECTION, POWDER, FOR SOLUTION INTRAVENOUS at 16:37

## 2025-01-03 RX ADMIN — VANCOMYCIN HYDROCHLORIDE 2000 MG: 5 INJECTION, POWDER, LYOPHILIZED, FOR SOLUTION INTRAVENOUS at 17:21

## 2025-01-03 RX ADMIN — MORPHINE SULFATE 4 MG: 4 INJECTION, SOLUTION INTRAMUSCULAR; INTRAVENOUS at 16:47

## 2025-01-03 RX ADMIN — MORPHINE SULFATE 4 MG: 4 INJECTION, SOLUTION INTRAMUSCULAR; INTRAVENOUS at 15:36

## 2025-01-03 RX ADMIN — SODIUM CHLORIDE, PRESERVATIVE FREE 10 ML: 5 INJECTION INTRAVENOUS at 21:48

## 2025-01-03 RX ADMIN — TIZANIDINE 4 MG: 4 TABLET ORAL at 23:32

## 2025-01-03 ASSESSMENT — PAIN DESCRIPTION - LOCATION
LOCATION: BACK

## 2025-01-03 ASSESSMENT — PAIN SCALES - GENERAL
PAINLEVEL_OUTOF10: 7
PAINLEVEL_OUTOF10: 10
PAINLEVEL_OUTOF10: 7

## 2025-01-03 ASSESSMENT — PAIN DESCRIPTION - DESCRIPTORS
DESCRIPTORS: SHARP;ACHING;DULL
DESCRIPTORS: DULL;ACHING

## 2025-01-03 ASSESSMENT — LIFESTYLE VARIABLES
HOW OFTEN DO YOU HAVE A DRINK CONTAINING ALCOHOL: NEVER
HOW MANY STANDARD DRINKS CONTAINING ALCOHOL DO YOU HAVE ON A TYPICAL DAY: PATIENT DOES NOT DRINK

## 2025-01-03 ASSESSMENT — PAIN DESCRIPTION - ORIENTATION
ORIENTATION: LOWER;MID
ORIENTATION: LOWER

## 2025-01-03 ASSESSMENT — PAIN - FUNCTIONAL ASSESSMENT: PAIN_FUNCTIONAL_ASSESSMENT: ACTIVITIES ARE NOT PREVENTED

## 2025-01-03 NOTE — ED TRIAGE NOTES
Pt reports she left 12/27/24 from inpatient [facility] for osteomyelitis and left AMA. PCP referred pt back to ED today for admission to [facility] which is closer to home. Pt c/o back pain, denies fever

## 2025-01-03 NOTE — CONSULTS
IV Consult complete.  Nexiva 20g 1.75\" PIV Inserted in Left Forearm  x 1 attempt using sterile ultrasound guided technique.  Brisk blood return, flushes easy.  Alice/ 1st set blood cultures drawn.    Second set blood cultures drawn from left AC via straight stick under ultrasound.

## 2025-01-03 NOTE — ED NOTES
ED TO INPATIENT SBAR HANDOFF    Patient Name: Devi Marte   :  1980  44 y.o.   Preferred Name  Devi  Family/Caregiver Present no   Restraints no   C-SSRS: Risk of Suicide: No Risk  Sitter no   Sepsis Risk Score        Situation  Chief Complaint   Patient presents with    Osteomyelitis      Brief Description of Patient's Condition: Devi Marte is a 44 y.o. female who presents to the emergency department, for further treatment of osteomyelitis of her spine.  The patient was recently diagnosed with osteomyelitis/discitis at L2-L3 on MRI done at [facility] where she was admitted from 2024 through 2024.  She underwent an L2/3 disc biopsy on  and results are reviewed from [facility] and show that it had light growth of strep pyogenes group A.  She left AMA prior to these results and prior to having a PICC line placed for IV antibiotics.  The patient states she has lower back pain.  It is sharp at times and achy at other times.  She rates it as 10 out of 10 pain.  It is constant.  There are no exacerbating or alleviating factors.  She takes methadone every day but states this is not controlling her pain.  She has a history of IV drug abuse but has been clean for the past year.  She denies fevers, chills, nausea, vomiting, abdominal pain or any other complaints.    A&O. Does own ADL's  Mental Status: oriented, alert, coherent, logical, thought processes intact, and able to concentrate and follow conversation  Arrived from: home    Imaging:   No orders to display     Abnormal labs:   Abnormal Labs Reviewed   CBC WITH AUTO DIFFERENTIAL - Abnormal; Notable for the following components:       Result Value    RBC 3.66 (*)     Hemoglobin 10.7 (*)     Hematocrit 32.9 (*)     Neutrophils % 71 (*)     Lymphocytes % 18 (*)     Monocytes % 5 (*)     Eosinophils % 5 (*)     All other components within normal limits   COMPREHENSIVE METABOLIC PANEL - Abnormal; Notable for

## 2025-01-03 NOTE — ED NOTES
Medication History  [facility]    Patient Name: Devi Marte 1980     Medication history has been completed by: Debo Lozano CPhT    Source(s) of information: patient and insurance claims     Primary Care Physician: Israel Oneill FNP     Pharmacy: [facility]    Allergies as of 01/03/2025 - Reviewed 01/03/2025   Allergen Reaction Noted    Latex Hives 02/19/2015    Darvocet [propoxyphene n-acetaminophen] Hives 03/19/2012    Asa [aspirin] Hives 10/16/2013    Other Hives 03/07/2023    Propoxyphene Hives 03/07/2023        Prior to Admission medications    Medication Sig Start Date End Date Taking? Authorizing Provider   acetaminophen (TYLENOL) 500 MG tablet Take 2 tablets by mouth 3 times daily 12/28/24 1/17/25 Yes Provider, MD Jordon   meloxicam (MOBIC) 15 MG tablet Take 1 tablet by mouth daily 12/28/24 1/27/25 Yes Provider, MD Jordon   nicotine (NICODERM CQ) 21 MG/24HR Place 1 patch onto the skin daily 12/29/24 1/28/25 Yes Provider, MD Jordon   tiZANidine (ZANAFLEX) 4 MG tablet Take 1 tablet by mouth every 6 hours as needed 12/28/24 1/7/25 Yes Provider, MD Jordon     Medications added or changed (ex. new medication, dosage change, interval change, formulation change):  See medication list as stated above    Medications removed from list (include reason, ex. noncompliance, medication cost, therapy complete etc.):   Gabapentin not taking    Comments:  Medication list reviewed with patient and insurance claims verified.  Patient reports no medications taken prior to ER visit.    To my knowledge the above medication history is accurate as of 1/3/2025 4:07 PM.   Debo Lozano CPhT   1/3/2025 4:07 PM

## 2025-01-03 NOTE — ED PROVIDER NOTES
EMERGENCY DEPARTMENT ENCOUNTER      CHIEF COMPLAINT:   Osteomyelitis of spine    HPI: Devi Marte is a 44 y.o. female who presents to the emergency department, for further treatment of osteomyelitis of her spine.  The patient was recently diagnosed with osteomyelitis/discitis at L2-L3 on MRI done at [facility] where she was admitted from 12/24/2024 through 12/28/2024.  She underwent an L2/3 disc biopsy on 12/26 and results are reviewed from [facility] and show that it had light growth of strep pyogenes group A.  She left AMA prior to these results and prior to having a PICC line placed for IV antibiotics.  The patient states she has lower back pain.  It is sharp at times and achy at other times.  She rates it as 10 out of 10 pain.  It is constant.  There are no exacerbating or alleviating factors.  She takes methadone every day but states this is not controlling her pain.  She has a history of IV drug abuse but has been clean for the past year.  She denies fevers, chills, nausea, vomiting, abdominal pain or any other complaints.    REVIEW OF SYSTEMS:   Constitutional:  Denies fever or chills  Eyes:  Denies change in visual acuity  HENT:  Denies nasal congestion or sore throat  Respiratory:  Denies cough or shortness of breath  Cardiovascular:  Denies chest pain or edema  GI:  Denies abdominal pain, nausea, vomiting, bloody stools or diarrhea  :  Denies dysuria  Musculoskeletal: See HPI  Integument:  Denies rash  Neurologic:  Denies headache, focal weakness or sensory changes  \"Remaining review of systems reviewed and negative. I have reviewed the nursing triage documentation and agree unless otherwise noted below.\"      PAST MEDICAL HISTORY:   Past Medical History:   Diagnosis Date    Crack cocaine use     Fentanyl dependence (HCC)     Hep C w/o coma, chronic (HCC)     Hepatitis C antibody positive in blood 03/11/2023    Heroin abuse (HCC)     Heroin dependence (HCC)     History

## 2025-01-03 NOTE — H&P
is level 3 due to the combination of above and specifically because of complexity of the case.    Goals status was discussed in detail with patient.  Verbalized understanding of different options of CODE STATUS.  Patient opted for full code.    Comment: Please note this report has been produced using speech recognition software and may contain errors related to that system including errors in grammar, punctuation, and spelling, as well as words and phrases that may be inappropriate. If there are any questions or concerns please feel free to contact the dictating provider for clarification.     Disposition:   Current Living situation: Home  Expected Disposition: Home  Estimated D/C: 3 to 4 days    Diet No diet orders on file   DVT Prophylaxis [] Lovenox, []  Heparin, [] SCDs, [] Ambulation,  [] Eliquis, [] Xarelto   Code Status Prior   Surrogate Decision Maker/ POA      History from:     patient    History of Present Illness:     Chief Complaint: Osteomyelitis  Devi Marte is a 44 y.o. female who presents with tractable low back pain myalgias fatigue weakness and mild fever of 99.  Denies any nausea vomiting diarrhea constipation dizziness lightheadedness leg pain leg swelling.  Patient has a history of IV drug use last use reportedly around 1 year ago.     Review of Systems: Need 10 Elements   Review of Systems  Negative except above  Objective:   No intake or output data in the 24 hours ending 01/03/25 1733   Vitals:   Vitals:    01/03/25 1505 01/03/25 1536   BP: 124/84    Pulse: 76    Resp: 16 20   Temp: 98.3 °F (36.8 °C)    TempSrc: Oral    SpO2: 96%    Weight: 77.1 kg (170 lb)    Height: 1.549 m (5' 1\")        Medications Prior to Admission     Prior to Admission medications    Medication Sig Start Date End Date Taking? Authorizing Provider   acetaminophen (TYLENOL) 500 MG tablet Take 2 tablets by mouth 3 times daily 12/28/24 1/17/25 Yes Provider, MD Jordon   meloxicam (MOBIC) 15 MG tablet Take 1

## 2025-01-03 NOTE — CONSULTS
PHARMACY VANCOMYCIN MONITORING SERVICE  Pharmacy consulted by Dr. Jenni Elias for monitoring and adjustment.      Indication for treatment: Vancomycin indication: Bone/Joint infection   Goal trough: Trough Goal: 15-20 mcg/mL  AUC/ANGLE: 400-600    Risk Factors for MRSA Identified:   Hospitalization within the past 90 days    Pertinent Laboratory Values:   Temp Readings from Last 3 Encounters:   01/03/25 98.3 °F (36.8 °C) (Oral)   12/22/24 97.7 °F (36.5 °C) (Oral)   07/21/23 98.2 °F (36.8 °C) (Oral)     Recent Labs     01/03/25  1514   WBC 7.6     Recent Labs     01/03/25  1514   BUN 18   CREATININE 0.7     Estimated Creatinine Clearance: 97 mL/min (based on SCr of 0.7 mg/dL).  No intake or output data in the 24 hours ending 01/03/25 1610  Last Encounter Weight:  Wt Readings from Last 3 Encounters:   01/03/25 77.1 kg (170 lb)   12/22/24 78 kg (171 lb 15.3 oz)   07/21/23 70.7 kg (155 lb 14.4 oz)       Pertinent Cultures:   Date    Source    Results  01/03   Blood    Pending       Vancomycin level:   TROUGH:  No results for input(s): \"VANCOTROUGH\" in the last 72 hours.  RANDOM:  No results for input(s): \"VANCORANDOM\" in the last 72 hours.    Assessment:  HPI: 44 y.o. female who presents to the emergency department, for further treatment of osteomyelitis of her spine.  The patient was recently diagnosed with osteomyelitis/discitis at L2-L3 on MRI done at [facility]   SCr, BUN, and urine output: 0.7, 18, UOP unavailable  Day(s) of therapy: Day 1  Vancomycin concentration: N/A    Plan:  Will order a loading dose of 2,000 mg IV x 1  Pharmacy will sign-off on this consult.  Please re-consult if vancomycin is warranted to continue for admission.       Thank you for the consult.  Karen Gee RP  1/3/2025 4:10 PM

## 2025-01-04 ENCOUNTER — APPOINTMENT (OUTPATIENT)
Dept: MRI IMAGING | Age: 45
DRG: 540 | End: 2025-01-04
Payer: COMMERCIAL

## 2025-01-04 LAB
ANION GAP SERPL CALCULATED.3IONS-SCNC: 9 MMOL/L (ref 9–17)
BUN SERPL-MCNC: 14 MG/DL (ref 7–20)
CALCIUM SERPL-MCNC: 8.6 MG/DL (ref 8.3–10.6)
CHLORIDE SERPL-SCNC: 104 MMOL/L (ref 99–110)
CO2 SERPL-SCNC: 20 MMOL/L (ref 21–32)
CREAT SERPL-MCNC: 0.7 MG/DL (ref 0.6–1.1)
ERYTHROCYTE [DISTWIDTH] IN BLOOD BY AUTOMATED COUNT: 13.8 % (ref 11.7–14.9)
GFR, ESTIMATED: 89 ML/MIN/1.73M2
GLUCOSE SERPL-MCNC: 108 MG/DL (ref 74–99)
HCT VFR BLD AUTO: 31.9 % (ref 37–47)
HGB BLD-MCNC: 10.4 G/DL (ref 12.5–16)
MAGNESIUM SERPL-MCNC: 1.9 MG/DL (ref 1.8–2.4)
MCH RBC QN AUTO: 29.5 PG (ref 27–31)
MCHC RBC AUTO-ENTMCNC: 32.6 G/DL (ref 32–36)
MCV RBC AUTO: 90.6 FL (ref 78–100)
PHOSPHATE SERPL-MCNC: 3.6 MG/DL (ref 2.5–4.9)
PLATELET # BLD AUTO: 185 K/UL (ref 140–440)
PMV BLD AUTO: 9.9 FL (ref 7.5–11.1)
POTASSIUM SERPL-SCNC: 4.3 MMOL/L (ref 3.5–5.1)
RBC # BLD AUTO: 3.52 M/UL (ref 4.2–5.4)
SODIUM SERPL-SCNC: 133 MMOL/L (ref 136–145)
WBC OTHER # BLD: 6.5 K/UL (ref 4–10.5)

## 2025-01-04 PROCEDURE — 2580000003 HC RX 258: Performed by: STUDENT IN AN ORGANIZED HEALTH CARE EDUCATION/TRAINING PROGRAM

## 2025-01-04 PROCEDURE — 85027 COMPLETE CBC AUTOMATED: CPT

## 2025-01-04 PROCEDURE — 99222 1ST HOSP IP/OBS MODERATE 55: CPT | Performed by: NEUROLOGICAL SURGERY

## 2025-01-04 PROCEDURE — 36415 COLL VENOUS BLD VENIPUNCTURE: CPT

## 2025-01-04 PROCEDURE — 6360000002 HC RX W HCPCS: Performed by: STUDENT IN AN ORGANIZED HEALTH CARE EDUCATION/TRAINING PROGRAM

## 2025-01-04 PROCEDURE — 72158 MRI LUMBAR SPINE W/O & W/DYE: CPT

## 2025-01-04 PROCEDURE — 6370000000 HC RX 637 (ALT 250 FOR IP): Performed by: STUDENT IN AN ORGANIZED HEALTH CARE EDUCATION/TRAINING PROGRAM

## 2025-01-04 PROCEDURE — 94761 N-INVAS EAR/PLS OXIMETRY MLT: CPT

## 2025-01-04 PROCEDURE — A9579 GAD-BASE MR CONTRAST NOS,1ML: HCPCS | Performed by: STUDENT IN AN ORGANIZED HEALTH CARE EDUCATION/TRAINING PROGRAM

## 2025-01-04 PROCEDURE — 84100 ASSAY OF PHOSPHORUS: CPT

## 2025-01-04 PROCEDURE — 6360000004 HC RX CONTRAST MEDICATION: Performed by: STUDENT IN AN ORGANIZED HEALTH CARE EDUCATION/TRAINING PROGRAM

## 2025-01-04 PROCEDURE — 83735 ASSAY OF MAGNESIUM: CPT

## 2025-01-04 PROCEDURE — 80048 BASIC METABOLIC PNL TOTAL CA: CPT

## 2025-01-04 PROCEDURE — 1200000000 HC SEMI PRIVATE

## 2025-01-04 PROCEDURE — 2500000003 HC RX 250 WO HCPCS: Performed by: STUDENT IN AN ORGANIZED HEALTH CARE EDUCATION/TRAINING PROGRAM

## 2025-01-04 RX ORDER — OXYCODONE AND ACETAMINOPHEN 5; 325 MG/1; MG/1
1 TABLET ORAL ONCE
Status: COMPLETED | OUTPATIENT
Start: 2025-01-04 | End: 2025-01-04

## 2025-01-04 RX ORDER — OXYCODONE AND ACETAMINOPHEN 5; 325 MG/1; MG/1
1 TABLET ORAL EVERY 4 HOURS PRN
Status: DISCONTINUED | OUTPATIENT
Start: 2025-01-04 | End: 2025-01-06 | Stop reason: HOSPADM

## 2025-01-04 RX ADMIN — VANCOMYCIN HYDROCHLORIDE 1000 MG: 1 INJECTION, POWDER, LYOPHILIZED, FOR SOLUTION INTRAVENOUS at 21:58

## 2025-01-04 RX ADMIN — GADOTERIDOL 16 ML: 279.3 INJECTION, SOLUTION INTRAVENOUS at 13:55

## 2025-01-04 RX ADMIN — HYDROMORPHONE HYDROCHLORIDE 0.5 MG: 1 INJECTION, SOLUTION INTRAMUSCULAR; INTRAVENOUS; SUBCUTANEOUS at 14:08

## 2025-01-04 RX ADMIN — HYDROMORPHONE HYDROCHLORIDE 0.5 MG: 1 INJECTION, SOLUTION INTRAMUSCULAR; INTRAVENOUS; SUBCUTANEOUS at 21:55

## 2025-01-04 RX ADMIN — CEFEPIME 2000 MG: 2 INJECTION, POWDER, FOR SOLUTION INTRAVENOUS at 02:31

## 2025-01-04 RX ADMIN — VANCOMYCIN 1500 MG: 1.5 INJECTION, SOLUTION INTRAVENOUS at 09:56

## 2025-01-04 RX ADMIN — OXYCODONE HYDROCHLORIDE AND ACETAMINOPHEN 1 TABLET: 5; 325 TABLET ORAL at 17:40

## 2025-01-04 RX ADMIN — TIZANIDINE 4 MG: 4 TABLET ORAL at 05:39

## 2025-01-04 RX ADMIN — CEFEPIME 2000 MG: 2 INJECTION, POWDER, FOR SOLUTION INTRAVENOUS at 12:39

## 2025-01-04 RX ADMIN — SODIUM CHLORIDE, PRESERVATIVE FREE 10 ML: 5 INJECTION INTRAVENOUS at 09:57

## 2025-01-04 ASSESSMENT — PAIN DESCRIPTION - ORIENTATION
ORIENTATION: LOWER;MID
ORIENTATION: LOWER
ORIENTATION: MID;LOWER
ORIENTATION: LOWER
ORIENTATION: LOWER;MID

## 2025-01-04 ASSESSMENT — PAIN - FUNCTIONAL ASSESSMENT
PAIN_FUNCTIONAL_ASSESSMENT: ACTIVITIES ARE NOT PREVENTED

## 2025-01-04 ASSESSMENT — PAIN DESCRIPTION - DESCRIPTORS
DESCRIPTORS: ACHING;THROBBING
DESCRIPTORS: DULL;ACHING
DESCRIPTORS: ACHING;THROBBING
DESCRIPTORS: ACHING;THROBBING

## 2025-01-04 ASSESSMENT — PAIN DESCRIPTION - PAIN TYPE: TYPE: ACUTE PAIN

## 2025-01-04 ASSESSMENT — PAIN SCALES - GENERAL
PAINLEVEL_OUTOF10: 10
PAINLEVEL_OUTOF10: 9
PAINLEVEL_OUTOF10: 8
PAINLEVEL_OUTOF10: 10
PAINLEVEL_OUTOF10: 8
PAINLEVEL_OUTOF10: 2

## 2025-01-04 ASSESSMENT — PAIN DESCRIPTION - LOCATION
LOCATION: BACK

## 2025-01-04 ASSESSMENT — PAIN DESCRIPTION - FREQUENCY: FREQUENCY: INTERMITTENT

## 2025-01-04 ASSESSMENT — PAIN DESCRIPTION - ONSET: ONSET: ON-GOING

## 2025-01-04 ASSESSMENT — PAIN SCALES - WONG BAKER: WONGBAKER_NUMERICALRESPONSE: NO HURT

## 2025-01-04 NOTE — CONSULTS
HOSPITAL CONSULT: 2025 [facility]  PATIENT: Devi Marte   MRN: [id number]   : 1980   NEUROSURGEON:  Chucho Penaloza DO  ATTENDING: Mukund Leyva MD  PCP: Israel Oneill FNP     HISTORY  Devi Marte is a 44 y.o. right handed female who presents with a history of IV drug abuse and back pain she was recently admitted to a local hospital where she was diagnosed with discitis at L2-3 however she left AMA.   She is  ambulatory and complains of pain over the right flank which worsened by nothing in particular and improved by nothing. The patient states pain is a 4 out of 10 and describes is as Aching    The patient lives lives with their partner and is  functional.    Neurosurgery consult was requested by hospitalist service for help with management and possible intervention.    PAST MEDICAL HISTORY:   Past Medical History:   Diagnosis Date    Crack cocaine use     Fentanyl dependence (HCC)     Hep C w/o coma, chronic (HCC)     Hepatitis C antibody positive in blood 2023    Heroin abuse (HCC)     Heroin dependence (HCC)     History of smoking at least 2 packs per day for at least 15 years     MRSA infection        CURRENT OUTPATIENT MEDICATIONS:   No current facility-administered medications on file prior to encounter.     Current Outpatient Medications on File Prior to Encounter   Medication Sig Dispense Refill    acetaminophen (TYLENOL) 500 MG tablet Take 2 tablets by mouth 3 times daily      meloxicam (MOBIC) 15 MG tablet Take 1 tablet by mouth daily      nicotine (NICODERM CQ) 21 MG/24HR Place 1 patch onto the skin daily      tiZANidine (ZANAFLEX) 4 MG tablet Take 1 tablet by mouth every 6 hours as needed         SURGICAL HISTORY:   Past Surgical History:   Procedure Laterality Date    CHOLECYSTECTOMY      CYST INCISION AND DRAINAGE      numerous places 6 different areas    FOOT DEBRIDEMENT Bilateral 2023    FOOT DEBRIDEMENT INCISION AND DRAINAGE performed by

## 2025-01-05 LAB
AMPHET UR QL SCN: NEGATIVE
ANION GAP SERPL CALCULATED.3IONS-SCNC: 9 MMOL/L (ref 9–17)
BARBITURATES UR QL SCN: NEGATIVE
BENZODIAZ UR QL: NEGATIVE
BILIRUB UR QL STRIP: NEGATIVE
BUN SERPL-MCNC: 14 MG/DL (ref 7–20)
CALCIUM SERPL-MCNC: 9 MG/DL (ref 8.3–10.6)
CANNABINOIDS UR QL SCN: POSITIVE
CHLORIDE SERPL-SCNC: 104 MMOL/L (ref 99–110)
CLARITY UR: CLEAR
CO2 SERPL-SCNC: 25 MMOL/L (ref 21–32)
COCAINE UR QL SCN: POSITIVE
COLOR UR: YELLOW
COMMENT: NORMAL
CREAT SERPL-MCNC: 0.8 MG/DL (ref 0.6–1.1)
CRP SERPL HS-MCNC: 6.7 MG/L (ref 0–5)
DATE LAST DOSE: ABNORMAL
ERYTHROCYTE [DISTWIDTH] IN BLOOD BY AUTOMATED COUNT: 13.7 % (ref 11.7–14.9)
ERYTHROCYTE [SEDIMENTATION RATE] IN BLOOD BY WESTERGREN METHOD: 45 MM/HR (ref 0–20)
FENTANYL UR QL: NEGATIVE
GFR, ESTIMATED: 79 ML/MIN/1.73M2
GLUCOSE SERPL-MCNC: 93 MG/DL (ref 74–99)
GLUCOSE UR STRIP-MCNC: NEGATIVE MG/DL
HCT VFR BLD AUTO: 35.1 % (ref 37–47)
HGB BLD-MCNC: 11.5 G/DL (ref 12.5–16)
HGB UR QL STRIP.AUTO: NEGATIVE
KETONES UR STRIP-MCNC: NEGATIVE MG/DL
LEUKOCYTE ESTERASE UR QL STRIP: NEGATIVE
MAGNESIUM SERPL-MCNC: 1.9 MG/DL (ref 1.8–2.4)
MCH RBC QN AUTO: 29.1 PG (ref 27–31)
MCHC RBC AUTO-ENTMCNC: 32.8 G/DL (ref 32–36)
MCV RBC AUTO: 88.9 FL (ref 78–100)
NITRITE UR QL STRIP: NEGATIVE
OPIATES UR QL SCN: NEGATIVE
OXYCODONE UR QL SCN: POSITIVE
PH UR STRIP: 6 [PH] (ref 5–8)
PHOSPHATE SERPL-MCNC: 3.9 MG/DL (ref 2.5–4.9)
PLATELET # BLD AUTO: 240 K/UL (ref 140–440)
PMV BLD AUTO: 9.3 FL (ref 7.5–11.1)
POTASSIUM SERPL-SCNC: 4.3 MMOL/L (ref 3.5–5.1)
PROT UR STRIP-MCNC: NEGATIVE MG/DL
RBC # BLD AUTO: 3.95 M/UL (ref 4.2–5.4)
SODIUM SERPL-SCNC: 138 MMOL/L (ref 136–145)
SP GR UR STRIP: 1.01 (ref 1–1.03)
TEST INFORMATION: ABNORMAL
TME LAST DOSE: ABNORMAL H
UROBILINOGEN UR STRIP-ACNC: 0.2 EU/DL (ref 0–1)
VANCOMYCIN DOSE: ABNORMAL MG
VANCOMYCIN SERPL-MCNC: 22.4 UG/ML (ref 10–20)
WBC OTHER # BLD: 7.1 K/UL (ref 4–10.5)

## 2025-01-05 PROCEDURE — 81003 URINALYSIS AUTO W/O SCOPE: CPT

## 2025-01-05 PROCEDURE — 2500000003 HC RX 250 WO HCPCS: Performed by: STUDENT IN AN ORGANIZED HEALTH CARE EDUCATION/TRAINING PROGRAM

## 2025-01-05 PROCEDURE — 83735 ASSAY OF MAGNESIUM: CPT

## 2025-01-05 PROCEDURE — 85652 RBC SED RATE AUTOMATED: CPT

## 2025-01-05 PROCEDURE — 2580000003 HC RX 258: Performed by: STUDENT IN AN ORGANIZED HEALTH CARE EDUCATION/TRAINING PROGRAM

## 2025-01-05 PROCEDURE — 6360000002 HC RX W HCPCS: Performed by: STUDENT IN AN ORGANIZED HEALTH CARE EDUCATION/TRAINING PROGRAM

## 2025-01-05 PROCEDURE — 80307 DRUG TEST PRSMV CHEM ANLYZR: CPT

## 2025-01-05 PROCEDURE — 6370000000 HC RX 637 (ALT 250 FOR IP): Performed by: STUDENT IN AN ORGANIZED HEALTH CARE EDUCATION/TRAINING PROGRAM

## 2025-01-05 PROCEDURE — 80048 BASIC METABOLIC PNL TOTAL CA: CPT

## 2025-01-05 PROCEDURE — 85027 COMPLETE CBC AUTOMATED: CPT

## 2025-01-05 PROCEDURE — 94761 N-INVAS EAR/PLS OXIMETRY MLT: CPT

## 2025-01-05 PROCEDURE — 84100 ASSAY OF PHOSPHORUS: CPT

## 2025-01-05 PROCEDURE — 36415 COLL VENOUS BLD VENIPUNCTURE: CPT

## 2025-01-05 PROCEDURE — 99231 SBSQ HOSP IP/OBS SF/LOW 25: CPT | Performed by: NEUROLOGICAL SURGERY

## 2025-01-05 PROCEDURE — 86140 C-REACTIVE PROTEIN: CPT

## 2025-01-05 PROCEDURE — 1200000000 HC SEMI PRIVATE

## 2025-01-05 PROCEDURE — 80202 ASSAY OF VANCOMYCIN: CPT

## 2025-01-05 RX ORDER — KETOROLAC TROMETHAMINE 30 MG/ML
15 INJECTION, SOLUTION INTRAMUSCULAR; INTRAVENOUS EVERY 6 HOURS PRN
Status: DISCONTINUED | OUTPATIENT
Start: 2025-01-05 | End: 2025-01-06 | Stop reason: HOSPADM

## 2025-01-05 RX ORDER — LIDOCAINE 4 G/G
1 PATCH TOPICAL DAILY
Status: DISCONTINUED | OUTPATIENT
Start: 2025-01-05 | End: 2025-01-06 | Stop reason: HOSPADM

## 2025-01-05 RX ADMIN — HYDROMORPHONE HYDROCHLORIDE 0.5 MG: 1 INJECTION, SOLUTION INTRAMUSCULAR; INTRAVENOUS; SUBCUTANEOUS at 18:37

## 2025-01-05 RX ADMIN — TIZANIDINE 4 MG: 4 TABLET ORAL at 13:42

## 2025-01-05 RX ADMIN — VANCOMYCIN HYDROCHLORIDE 1000 MG: 1 INJECTION, POWDER, LYOPHILIZED, FOR SOLUTION INTRAVENOUS at 23:09

## 2025-01-05 RX ADMIN — OXYCODONE HYDROCHLORIDE AND ACETAMINOPHEN 1 TABLET: 5; 325 TABLET ORAL at 13:42

## 2025-01-05 RX ADMIN — SODIUM CHLORIDE, PRESERVATIVE FREE 10 ML: 5 INJECTION INTRAVENOUS at 08:49

## 2025-01-05 RX ADMIN — OXYCODONE HYDROCHLORIDE AND ACETAMINOPHEN 1 TABLET: 5; 325 TABLET ORAL at 22:37

## 2025-01-05 RX ADMIN — CEFEPIME 2000 MG: 2 INJECTION, POWDER, FOR SOLUTION INTRAVENOUS at 00:35

## 2025-01-05 RX ADMIN — KETOROLAC TROMETHAMINE 15 MG: 30 INJECTION, SOLUTION INTRAMUSCULAR; INTRAVENOUS at 11:26

## 2025-01-05 RX ADMIN — SODIUM CHLORIDE: 9 INJECTION, SOLUTION INTRAVENOUS at 09:00

## 2025-01-05 RX ADMIN — VANCOMYCIN HYDROCHLORIDE 1000 MG: 1 INJECTION, POWDER, LYOPHILIZED, FOR SOLUTION INTRAVENOUS at 09:02

## 2025-01-05 RX ADMIN — SODIUM CHLORIDE: 9 INJECTION, SOLUTION INTRAVENOUS at 18:42

## 2025-01-05 RX ADMIN — SODIUM CHLORIDE: 9 INJECTION, SOLUTION INTRAVENOUS at 00:34

## 2025-01-05 RX ADMIN — HYDROMORPHONE HYDROCHLORIDE 0.5 MG: 1 INJECTION, SOLUTION INTRAMUSCULAR; INTRAVENOUS; SUBCUTANEOUS at 02:43

## 2025-01-05 RX ADMIN — OXYCODONE HYDROCHLORIDE AND ACETAMINOPHEN 1 TABLET: 5; 325 TABLET ORAL at 04:46

## 2025-01-05 RX ADMIN — CEFEPIME 2000 MG: 2 INJECTION, POWDER, FOR SOLUTION INTRAVENOUS at 18:43

## 2025-01-05 RX ADMIN — HYDROMORPHONE HYDROCHLORIDE 0.5 MG: 1 INJECTION, SOLUTION INTRAMUSCULAR; INTRAVENOUS; SUBCUTANEOUS at 08:49

## 2025-01-05 RX ADMIN — CEFEPIME 2000 MG: 2 INJECTION, POWDER, FOR SOLUTION INTRAVENOUS at 11:03

## 2025-01-05 RX ADMIN — SODIUM CHLORIDE, PRESERVATIVE FREE 10 ML: 5 INJECTION INTRAVENOUS at 00:30

## 2025-01-05 ASSESSMENT — PAIN DESCRIPTION - DESCRIPTORS
DESCRIPTORS: DULL;SHARP
DESCRIPTORS: SHARP;DULL
DESCRIPTORS: ACHING
DESCRIPTORS: ACHING
DESCRIPTORS: ACHING;THROBBING
DESCRIPTORS: ACHING

## 2025-01-05 ASSESSMENT — PAIN DESCRIPTION - ONSET
ONSET: ON-GOING

## 2025-01-05 ASSESSMENT — PAIN DESCRIPTION - ORIENTATION
ORIENTATION: MID
ORIENTATION: LOWER
ORIENTATION: RIGHT
ORIENTATION: RIGHT
ORIENTATION: LOWER

## 2025-01-05 ASSESSMENT — PAIN DESCRIPTION - LOCATION
LOCATION: LEG;BACK
LOCATION: BACK
LOCATION: BACK;LEG
LOCATION: BACK
LOCATION: BACK

## 2025-01-05 ASSESSMENT — PAIN SCALES - GENERAL
PAINLEVEL_OUTOF10: 8
PAINLEVEL_OUTOF10: 0
PAINLEVEL_OUTOF10: 10
PAINLEVEL_OUTOF10: 8
PAINLEVEL_OUTOF10: 10
PAINLEVEL_OUTOF10: 10
PAINLEVEL_OUTOF10: 0
PAINLEVEL_OUTOF10: 10
PAINLEVEL_OUTOF10: 10
PAINLEVEL_OUTOF10: 0

## 2025-01-05 ASSESSMENT — PAIN - FUNCTIONAL ASSESSMENT
PAIN_FUNCTIONAL_ASSESSMENT: ACTIVITIES ARE NOT PREVENTED
PAIN_FUNCTIONAL_ASSESSMENT: PREVENTS OR INTERFERES SOME ACTIVE ACTIVITIES AND ADLS
PAIN_FUNCTIONAL_ASSESSMENT: ACTIVITIES ARE NOT PREVENTED

## 2025-01-05 ASSESSMENT — PAIN DESCRIPTION - FREQUENCY
FREQUENCY: CONTINUOUS

## 2025-01-05 ASSESSMENT — PAIN DESCRIPTION - PAIN TYPE
TYPE: ACUTE PAIN

## 2025-01-06 VITALS
HEIGHT: 61 IN | TEMPERATURE: 98.2 F | BODY MASS INDEX: 32.1 KG/M2 | RESPIRATION RATE: 18 BRPM | HEART RATE: 66 BPM | WEIGHT: 170 LBS | SYSTOLIC BLOOD PRESSURE: 134 MMHG | OXYGEN SATURATION: 99 % | DIASTOLIC BLOOD PRESSURE: 94 MMHG

## 2025-01-06 PROCEDURE — 2580000003 HC RX 258: Performed by: STUDENT IN AN ORGANIZED HEALTH CARE EDUCATION/TRAINING PROGRAM

## 2025-01-06 RX ADMIN — SODIUM CHLORIDE, POTASSIUM CHLORIDE, SODIUM LACTATE AND CALCIUM CHLORIDE: 600; 310; 30; 20 INJECTION, SOLUTION INTRAVENOUS at 00:17

## 2025-01-06 NOTE — PROGRESS NOTES
V2.0    [facility] [place] [facility] Progress Note      Name:  Devi Marte /Age/Sex: 1980  (44 y.o. female)   MRN & CSN:  [id number] & [id number] Encounter Date/Time: 2025 9:44 AM EST   Location:  [address] [place] [address] PCP: Israel Oneill FNP     Attending:Mukund Leyva MD       Hospital Day: 2    Assessment and Recommendations   Devi Marte is a 44 y.o. female with pmh of PSA, Hepatitis C who presents with Osteomyelitis    Past Medical History:   Diagnosis Date   • Crack cocaine use    • Fentanyl dependence (HCC)    • Hep C w/o coma, chronic (HCC)    • Hepatitis C antibody positive in blood 2023   • Heroin abuse (HCC)    • Heroin dependence (HCC)    • History of smoking at least 2 packs per day for at least 15 years    • MRSA infection      Plan:     Intractable low back pain in the setting of L2-L3 osteomyelitis.  History of IV drug use last use reported to be around 1 year ago.  Check urine drug screen.  Telemetry in place.  Started on vancomycin and cefepime cultures are pending neurosurgery and infectious disease have been consulted de-escalate antibiotic based on culture results.  Reportedly previous history of AMA from [place].  Cultures at that time showed strep pyogenes repeat cultures are pending currently on appropriate antibiotics broad-spectrum.  MRI of the lumbar spine with and without contrast along with Dilaudid for pain control has been ordered  Chronic muscle spasms on Zanaflex  Tobacco use disorder uses nicotine patch.   History of endocarditis and cellulitis  History of hepatitis C and illicit drug use    Diet ADULT DIET; Regular; 5 carb choices (75 gm/meal)   DVT Prophylaxis [] Lovenox, []  Heparin, [] SCDs, [] Ambulation,  [] Eliquis, [] Xarelto  [] Coumadin   Code Status Full Code   Disposition From: Home  Expected Disposition: Home  Estimated Date of Discharge:   Patient requires continued admission due to    Surrogate Decision Maker/ POA       Personally reviewed Lab Studies and Imaging 
    V2.0    [facility]      Name:  Devi Marte /Age/Sex: 1980  (44 y.o. female)   MRN & CSN:  [id number] & [id number] Encounter Date/Time: 2025 9:44 AM EST   Location:  [address] [place] [address] PCP: Israel Oneill FNP     Attending:Mukund Leyva MD       Hospital Day: 3    Assessment and Recommendations   Devi Marte is a 44 y.o. female with pmh of PSA, Hepatitis C who presents with Osteomyelitis    Past Medical History:   Diagnosis Date   • Crack cocaine use    • Fentanyl dependence (HCC)    • Hep C w/o coma, chronic (HCC)    • Hepatitis C antibody positive in blood 2023   • Heroin abuse (HCC)    • Heroin dependence (HCC)    • History of smoking at least 2 packs per day for at least 15 years    • MRSA infection      Plan:     Intractable low back pain in the setting of L2-L3 osteomyelitis.  History of IV drug use last use reported to be around 1 year ago.  Check urine drug screen.  Telemetry in place.  Started on vancomycin and cefepime cultures are pending neurosurgery and infectious disease have been consulted de-escalate antibiotic based on culture results.  Reportedly previous history of AMA from [place].  Cultures at that time showed strep pyogenes repeat cultures are pending currently on appropriate antibiotics broad-spectrum.  MRI of the lumbar spine with and without contrast along with Dilaudid for pain control has been ordered  PSA- Utox + for cannabinoids, cocaine. Monitor for withdrawal  Chronic muscle spasms on Zanaflex  Tobacco use disorder uses nicotine patch.   History of endocarditis and cellulitis  History of hepatitis C and illicit drug use    Diet ADULT DIET; Regular; 5 carb choices (75 gm/meal)   DVT Prophylaxis [] Lovenox, []  Heparin, [] SCDs, [] Ambulation,  [] Eliquis, [] Xarelto  [] Coumadin   Code Status Full Code   Disposition From: Home  Expected Disposition: Home  Estimated Date of Discharge:   Patient requires continued admission due to    Surrogate Decision 
Attempted to contact RN this AM multiple times no answer, will attempt later.   
PHARMACY VANCOMYCIN MONITORING SERVICE  Pharmacy consulted by Dr. Bret Carter MD for monitoring and adjustment.    Indication for treatment: Vancomycin indication: Bone/Joint infection   Goal trough: Trough Goal: 10-15 mcg/mL  AUC/ANGLE: 400-600    Risk Factors for MRSA Identified:   Hospitalization within the past 90 days    Pertinent Laboratory Values:   Temp Readings from Last 3 Encounters:   01/04/25 97.9 °F (36.6 °C) (Oral)   12/22/24 97.7 °F (36.5 °C) (Oral)   07/21/23 98.2 °F (36.8 °C) (Oral)     Recent Labs     01/03/25  1514 01/04/25  1046   WBC 7.6 6.5     Recent Labs     01/03/25  1514 01/04/25  1046   BUN 18 14   CREATININE 0.7 0.7     Estimated Creatinine Clearance: 97 mL/min (based on SCr of 0.7 mg/dL).  No intake or output data in the 24 hours ending 01/04/25 1554  Last Encounter Weight:  Wt Readings from Last 3 Encounters:   01/03/25 77.1 kg (170 lb)   12/22/24 78 kg (171 lb 15.3 oz)   07/21/23 70.7 kg (155 lb 14.4 oz)   Ideal body weight: 47.8 kg (105 lb 6.1 oz)  Adjusted ideal body weight: 59.5 kg (131 lb 3.6 oz)      Pertinent Cultures:   Date    Source    Results  1/3   Blood    NGTD       Vancomycin level:   TROUGH:  No results for input(s): \"VANCOTROUGH\" in the last 72 hours.  RANDOM:  No results for input(s): \"VANCORANDOM\" in the last 72 hours.    Assessment:  HPI: 44 y.o. female who presents to the emergency department, for further treatment of osteomyelitis of her spine.  The patient was recently diagnosed with osteomyelitis/discitis at L2-L3 on MRI done at [facility]   SCr, BUN, and urine output:   Scr- 0.7 mg/dL at baseline   BUN-WNL   UOP not documented   Day(s) of therapy: 2  Vancomycin concentration: TBD     Plan:  Vancomycin 2000 mg IVPB x once loading dose given yesterday, Will dose reduce to Vancomycin 1000 mg IVPB q12hr(~16.8 mg/kg on adjBW)  Predicts ss- mg/L*hr  and ss-trough of 15.2 mg/L  Patient likely to accumulate due to BMI  Level tomorrow AM   Pharmacy 
PHARMACY VANCOMYCIN MONITORING SERVICE  Pharmacy consulted by Dr. Bret Carter MD for monitoring and adjustment.    Indication for treatment: Vancomycin indication: Bone/Joint infection   Goal trough: Trough Goal: 15-20 mcg/mL  AUC/ANGLE: 400-600    Risk Factors for MRSA Identified:   Hospitalization within the past 90 days    Pertinent Laboratory Values:   Temp Readings from Last 3 Encounters:   01/05/25 97.7 °F (36.5 °C) (Oral)   12/22/24 97.7 °F (36.5 °C) (Oral)   07/21/23 98.2 °F (36.8 °C) (Oral)     Recent Labs     01/03/25  1514 01/04/25  1046 01/05/25  0304   WBC 7.6 6.5 7.1     Recent Labs     01/03/25  1514 01/04/25  1046 01/05/25  0304   BUN 18 14 14   CREATININE 0.7 0.7 0.8     Estimated Creatinine Clearance: 85 mL/min (based on SCr of 0.8 mg/dL).    Intake/Output Summary (Last 24 hours) at 1/5/2025 1219  Last data filed at 1/5/2025 0030  Gross per 24 hour   Intake 130 ml   Output --   Net 130 ml     Last Encounter Weight:  Wt Readings from Last 3 Encounters:   01/03/25 77.1 kg (170 lb)   12/22/24 78 kg (171 lb 15.3 oz)   07/21/23 70.7 kg (155 lb 14.4 oz)   Ideal body weight: 47.8 kg (105 lb 6.1 oz)  Adjusted ideal body weight: 59.5 kg (131 lb 3.6 oz)      Pertinent Cultures:   Date    Source    Results  1/3   Blood    NGTD       Vancomycin level:   TROUGH:  No results for input(s): \"VANCOTROUGH\" in the last 72 hours.  RANDOM:    Recent Labs     01/05/25  0304   VANCORANDOM 22.4*       Assessment:  HPI: 44 y.o. female who presents to the emergency department, for further treatment of osteomyelitis of her spine.  The patient was recently diagnosed with osteomyelitis/discitis at L2-L3 on MRI done at [facility]   SCr, BUN, and urine output:   Scr- 0.8 mg/dL near baseline   BUN-WNL   UOP not documented   Day(s) of therapy: 3  Vancomycin concentration:   1/5- 22.4 mg/L ~5 hour level on vancomycin 1000 mg q12hr, received 1500 mg yesterday AM before dose adjustment, predicted ssAUC prior to dose 
PHARMACY VANCOMYCIN MONITORING SERVICE  Pharmacy consulted by Dr. Jenni Elias for monitoring and adjustment.      Indication for treatment: Vancomycin indication: Bone/Joint infection   Goal trough: Trough Goal: 15-20 mcg/mL  AUC/ANGLE: 400-600    Risk Factors for MRSA Identified:   Hospitalization within the past 90 days    Pertinent Laboratory Values:   Temp Readings from Last 3 Encounters:   01/03/25 98.3 °F (36.8 °C) (Oral)   12/22/24 97.7 °F (36.5 °C) (Oral)   07/21/23 98.2 °F (36.8 °C) (Oral)     Recent Labs     01/03/25  1514   WBC 7.6     Recent Labs     01/03/25  1514   BUN 18   CREATININE 0.7     Estimated Creatinine Clearance: 97 mL/min (based on SCr of 0.7 mg/dL).  No intake or output data in the 24 hours ending 01/03/25 1610  Last Encounter Weight:  Wt Readings from Last 3 Encounters:   01/03/25 77.1 kg (170 lb)   12/22/24 78 kg (171 lb 15.3 oz)   07/21/23 70.7 kg (155 lb 14.4 oz)       Pertinent Cultures:   Date    Source    Results  01/03   Blood    Pending       Vancomycin level:   TROUGH:  No results for input(s): \"VANCOTROUGH\" in the last 72 hours.  RANDOM:  No results for input(s): \"VANCORANDOM\" in the last 72 hours.    Assessment:  HPI: 44 y.o. female who presents to the emergency department, for further treatment of osteomyelitis of her spine.  The patient was recently diagnosed with osteomyelitis/discitis at L2-L3 on MRI done at [facility]   SCr, BUN, and urine output: 0.7, 18, UOP unavailable  Day(s) of therapy: Day 1  Vancomycin concentration: TBD    Plan:  Loading dose of vancomycin administered: 2000 mg. Followed by a scheduled dose of vancomycin 1500 mg every 12 hours.  Predicts ss- and ss-trough of 15 mg/L    VANCOMYCIN CONCENTRATION SCHEDULED FOR 01/05 @0600     Thank you for the consult.  Karen Gee RPH  1/3/2025 4:10 PM    
Patient being verbally aggressive with Lab when attempting to draw for labs. Patient refused.   
Patient caught smoking in room. Patient educated about the hospital rules. Security called to help assist with issue. Patient wanted to leave and refused to sign AMA papers.  
Pt left off of the floor to go smoke . Екатерина Barrow was called. Pt came back to room shortly after. Pt says she was told that it was ok for her to leave and come back whenever she wanted.This nurse educated that it is not allowed at this facility.  
Pt needs to have MRI Questionnaire completed in order for pt to have MRI performed.  
LABGLOM 79 01/05/2025 03:04 AM    LABGLOM >60 07/21/2023 01:41 AM         Imaging:  MRI of the lumbar spine is reviewed shows multilevel lumbar spondylosis there is near collapse of the disc space at L2-3 with osteomyelitis the T1 images show hypointense signal into the body of L2 and L3.    Electronically signed by: Chucho Penaloza DO, 1/5/2025 1:42 PM

## 2025-01-06 NOTE — PLAN OF CARE
Problem: Discharge Planning  Goal: Discharge to home or other facility with appropriate resources  1/6/2025 0056 by Shannan Islas RN  Outcome: Progressing  1/5/2025 2108 by Monet Draper RN  Outcome: Progressing  Flowsheets (Taken 1/5/2025 0800)  Discharge to home or other facility with appropriate resources:   Identify barriers to discharge with patient and caregiver   Arrange for needed discharge resources and transportation as appropriate   Identify discharge learning needs (meds, wound care, etc)   Arrange for interpreters to assist at discharge as needed   Refer to discharge planning if patient needs post-hospital services based on physician order or complex needs related to functional status, cognitive ability or social support system     Problem: Pain  Goal: Verbalizes/displays adequate comfort level or baseline comfort level  1/6/2025 0056 by Shannan Islas RN  Outcome: Progressing  1/5/2025 2108 by Monet Draper, RN  Outcome: Progressing  Flowsheets (Taken 1/5/2025 0800)  Verbalizes/displays adequate comfort level or baseline comfort level:   Encourage patient to monitor pain and request assistance   Assess pain using appropriate pain scale   Administer analgesics based on type and severity of pain and evaluate response   Implement non-pharmacological measures as appropriate and evaluate response   Consider cultural and social influences on pain and pain management   Notify Licensed Independent Practitioner if interventions unsuccessful or patient reports new pain

## 2025-01-06 NOTE — PLAN OF CARE
Problem: Discharge Planning  Goal: Discharge to home or other facility with appropriate resources  Outcome: Progressing  Flowsheets (Taken 1/5/2025 0800)  Discharge to home or other facility with appropriate resources:   Identify barriers to discharge with patient and caregiver   Arrange for needed discharge resources and transportation as appropriate   Identify discharge learning needs (meds, wound care, etc)   Arrange for interpreters to assist at discharge as needed   Refer to discharge planning if patient needs post-hospital services based on physician order or complex needs related to functional status, cognitive ability or social support system     Problem: Pain  Goal: Verbalizes/displays adequate comfort level or baseline comfort level  Outcome: Progressing  Flowsheets (Taken 1/5/2025 0800)  Verbalizes/displays adequate comfort level or baseline comfort level:   Encourage patient to monitor pain and request assistance   Assess pain using appropriate pain scale   Administer analgesics based on type and severity of pain and evaluate response   Implement non-pharmacological measures as appropriate and evaluate response   Consider cultural and social influences on pain and pain management   Notify Licensed Independent Practitioner if interventions unsuccessful or patient reports new pain

## 2025-01-08 LAB
MICROORGANISM SPEC CULT: NORMAL
MICROORGANISM SPEC CULT: NORMAL
SERVICE CMNT-IMP: NORMAL
SERVICE CMNT-IMP: NORMAL
SPECIMEN DESCRIPTION: NORMAL
SPECIMEN DESCRIPTION: NORMAL

## 2025-01-09 NOTE — DISCHARGE SUMMARY
V2.0  Discharge Summary    Name:  Devi Marte /Age/Sex: 1980 (44 y.o. female)   Admit Date: 1/3/2025  Discharge Date: 25    MRN & CSN:  [id number] & [id number] Encounter Date and Time 25 7:28 AM EST    Attending:  No att. providers found Discharging Provider: Mukund Leyva MD       Hospital Course:     Brief HPI: As per admitting, \" Devi Marte is a 44 y.o. female who presents with tractable low back pain myalgias fatigue weakness and mild fever of 99.  Denies any nausea vomiting diarrhea constipation dizziness lightheadedness leg pain leg swelling.  Patient has a history of IV drug use last use reportedly around 1 year ago. \"    Pt admitted for intractable back pain, c/f Osteomyelitis. Nsx was onboard, pt s/w IV abx and MRI eventually reported \"1.  Sequelae of discitis osteomyelitis at the level of L2-L3 with pre and paravertebral soft tissue swelling and enhancement and minimal epidural thickening and enhancement as outlined. There is associated mild to moderate spinal canal stenosis at this level. There is consequent pathologic fracture of the L3 vertebral body with loss of 25 to 30% of the the body height anteriorly and erosive changes at the endplates of L2 and L3 as outlined\". Pt focused on pain medications with a preference for opioids. Pt was AAOx3 on my evaluation during the day. Pt's Utox c/f drug use. ID consult recs were awaited. Apparently as epr charts, early on25- patient wanted to smoke in hospital which staff reportedly prohobited patient from after which patient got upset and left AMA. Further details per staff on call.     Brief Problem Based Course:     Intractable low back pain in the setting of L2-L3 osteomyelitis.  History of IV drug use last use reported to be around 1 year ago.  Check urine drug screen.  Telemetry in place.  Started on vancomycin and cefepime cultures are pending neurosurgery and infectious disease have been consulted de-escalate antibiotic

## 2025-06-30 ENCOUNTER — APPOINTMENT (OUTPATIENT)
Dept: GENERAL RADIOLOGY | Age: 45
End: 2025-06-30
Payer: MEDICAID

## 2025-06-30 VITALS
BODY MASS INDEX: 33.04 KG/M2 | DIASTOLIC BLOOD PRESSURE: 67 MMHG | OXYGEN SATURATION: 98 % | HEIGHT: 61 IN | SYSTOLIC BLOOD PRESSURE: 130 MMHG | WEIGHT: 175 LBS | HEART RATE: 73 BPM | TEMPERATURE: 97.9 F | RESPIRATION RATE: 19 BRPM

## 2025-06-30 PROCEDURE — 73590 X-RAY EXAM OF LOWER LEG: CPT

## 2025-06-30 PROCEDURE — 4500000002 HC ER NO CHARGE

## 2025-06-30 PROCEDURE — 73620 X-RAY EXAM OF FOOT: CPT

## 2025-06-30 PROCEDURE — 73610 X-RAY EXAM OF ANKLE: CPT

## 2025-06-30 ASSESSMENT — LIFESTYLE VARIABLES
HOW MANY STANDARD DRINKS CONTAINING ALCOHOL DO YOU HAVE ON A TYPICAL DAY: PATIENT DOES NOT DRINK
HOW OFTEN DO YOU HAVE A DRINK CONTAINING ALCOHOL: NEVER

## 2025-06-30 ASSESSMENT — PAIN - FUNCTIONAL ASSESSMENT: PAIN_FUNCTIONAL_ASSESSMENT: 0-10

## 2025-06-30 ASSESSMENT — PAIN SCALES - GENERAL: PAINLEVEL_OUTOF10: 10

## 2025-07-01 ENCOUNTER — HOSPITAL ENCOUNTER (EMERGENCY)
Age: 45
Discharge: LEFT BEFORE DISCHARGE | End: 2025-07-01
Payer: MEDICAID

## 2025-07-01 ENCOUNTER — APPOINTMENT (OUTPATIENT)
Dept: GENERAL RADIOLOGY | Age: 45
End: 2025-07-01
Payer: COMMERCIAL

## 2025-07-01 ENCOUNTER — HOSPITAL ENCOUNTER (EMERGENCY)
Age: 45
Discharge: HOME | End: 2025-07-01
Attending: EMERGENCY MEDICINE
Payer: COMMERCIAL

## 2025-07-01 VITALS
DIASTOLIC BLOOD PRESSURE: 85 MMHG | HEART RATE: 76 BPM | RESPIRATION RATE: 19 BRPM | SYSTOLIC BLOOD PRESSURE: 124 MMHG | OXYGEN SATURATION: 98 % | TEMPERATURE: 98.3 F

## 2025-07-01 DIAGNOSIS — S82.861A CLOSED DISPLACED MAISONNEUVE FRACTURE OF RIGHT LOWER EXTREMITY, INITIAL ENCOUNTER: Primary | ICD-10-CM

## 2025-07-01 PROCEDURE — 99283 EMERGENCY DEPT VISIT LOW MDM: CPT

## 2025-07-01 PROCEDURE — 6370000000 HC RX 637 (ALT 250 FOR IP): Performed by: EMERGENCY MEDICINE

## 2025-07-01 PROCEDURE — 73590 X-RAY EXAM OF LOWER LEG: CPT

## 2025-07-01 RX ORDER — NAPROXEN 250 MG/1
500 TABLET ORAL ONCE
Status: COMPLETED | OUTPATIENT
Start: 2025-07-01 | End: 2025-07-01

## 2025-07-01 RX ORDER — NAPROXEN 500 MG/1
500 TABLET ORAL 2 TIMES DAILY WITH MEALS
Qty: 60 TABLET | Refills: 0 | Status: SHIPPED | OUTPATIENT
Start: 2025-07-01

## 2025-07-01 RX ORDER — OXYCODONE AND ACETAMINOPHEN 5; 325 MG/1; MG/1
1 TABLET ORAL EVERY 6 HOURS PRN
Qty: 12 TABLET | Refills: 0 | Status: SHIPPED | OUTPATIENT
Start: 2025-07-01 | End: 2025-07-04

## 2025-07-01 RX ORDER — OXYCODONE AND ACETAMINOPHEN 5; 325 MG/1; MG/1
1 TABLET ORAL ONCE
Refills: 0 | Status: COMPLETED | OUTPATIENT
Start: 2025-07-01 | End: 2025-07-01

## 2025-07-01 RX ADMIN — NAPROXEN 500 MG: 250 TABLET ORAL at 03:27

## 2025-07-01 RX ADMIN — OXYCODONE AND ACETAMINOPHEN 1 TABLET: 5; 325 TABLET ORAL at 03:27

## 2025-07-01 ASSESSMENT — PAIN DESCRIPTION - ORIENTATION
ORIENTATION: RIGHT
ORIENTATION: RIGHT

## 2025-07-01 ASSESSMENT — PAIN DESCRIPTION - LOCATION: LOCATION: LEG

## 2025-07-01 ASSESSMENT — PAIN SCALES - GENERAL
PAINLEVEL_OUTOF10: 10
PAINLEVEL_OUTOF10: 10
PAINLEVEL_OUTOF10: 0

## 2025-07-01 ASSESSMENT — PAIN - FUNCTIONAL ASSESSMENT
PAIN_FUNCTIONAL_ASSESSMENT: PREVENTS OR INTERFERES WITH MANY ACTIVE NOT PASSIVE ACTIVITIES
PAIN_FUNCTIONAL_ASSESSMENT: 0-10
PAIN_FUNCTIONAL_ASSESSMENT: 0-10
PAIN_FUNCTIONAL_ASSESSMENT: PREVENTS OR INTERFERES WITH MANY ACTIVE NOT PASSIVE ACTIVITIES
PAIN_FUNCTIONAL_ASSESSMENT: NONE - DENIES PAIN

## 2025-07-01 NOTE — ED TRIAGE NOTES
Pt reports to Ed with right leg pain,Pt was triaged earlier and missed name being called to be seen. Pt reports having Xrays done while in waiting room during original visit

## 2025-07-01 NOTE — ED PROVIDER NOTES
Triage Chief Complaint:   Leg Pain (Right leg pain)    [place]:  Devi Marte is a 45 y.o. female that presents with right leg pain.  Patient was in baseline state of health until 3 days ago when she actually reports that she had a very short fall of just approximately 2 feet with a twisting mechanism to her right leg.  Since that time patient been having pain to the right leg.  Patient has been able to walk.  Patient denies any other pain at this time.    Patient was actually in the ER earlier and was marked as eloped because she missed her name being called to be seen.  X-rays on prior visit do demonstrate proximal fibular fracture.    ROS:  General:  No fevers, no chills  Respiratory:  No shortness of breath  Neurologic:  No numbness, no weakness  Extremities:  No edema, + pain  Skin:  No rash  Psych: No axienty    Past Medical History:   Diagnosis Date    Crack cocaine use     Fentanyl dependence (HCC)     Hep C w/o coma, chronic (HCC)     Hepatitis C antibody positive in blood 03/11/2023    Heroin abuse (HCC)     Heroin dependence (HCC)     History of smoking at least 2 packs per day for at least 15 years     MRSA infection      Past Surgical History:   Procedure Laterality Date    CHOLECYSTECTOMY      CYST INCISION AND DRAINAGE      numerous places 6 different areas    FOOT DEBRIDEMENT Bilateral 7/18/2023    FOOT DEBRIDEMENT INCISION AND DRAINAGE performed by Dominic Blanc MD at [facility] OR    LEG SURGERY Bilateral 3/7/2023    UPPER AND LOWER EXTREMEITIES DEBRIDEMENT INCISION AND DRAINAGE performed by Monico Flowers DO at [facility] OR    OTHER SURGICAL HISTORY  02/27/15    I&D of R shoulder AC joint    SHOULDER SURGERY Right     TOE SURGERY      were amputated in accident and sewn back on    TONSILLECTOMY       Family History   Problem Relation Age of Onset    Heart Disease Mother     Asthma Mother     Cancer Maternal Grandmother     Heart Disease Maternal Grandmother     Asthma Maternal Grandmother

## 2025-07-01 NOTE — DISCHARGE INSTRUCTIONS
Please remain nonweightbearing and wearing boot to the affected extremity.  Please call orthopedic office in the morning to schedule follow-up.  Please take scheduled pain medications at home to control symptoms.

## 2025-07-02 ENCOUNTER — OFFICE VISIT (OUTPATIENT)
Dept: ORTHOPEDIC SURGERY | Age: 45
End: 2025-07-02
Payer: COMMERCIAL

## 2025-07-02 VITALS — BODY MASS INDEX: 33.04 KG/M2 | WEIGHT: 175 LBS | HEART RATE: 75 BPM | HEIGHT: 61 IN | OXYGEN SATURATION: 99 %

## 2025-07-02 DIAGNOSIS — S82.831A CLOSED FRACTURE OF PROXIMAL END OF RIGHT FIBULA, UNSPECIFIED FRACTURE MORPHOLOGY, INITIAL ENCOUNTER: Primary | ICD-10-CM

## 2025-07-02 PROCEDURE — 4004F PT TOBACCO SCREEN RCVD TLK: CPT | Performed by: PHYSICIAN ASSISTANT

## 2025-07-02 PROCEDURE — G8427 DOCREV CUR MEDS BY ELIG CLIN: HCPCS | Performed by: PHYSICIAN ASSISTANT

## 2025-07-02 PROCEDURE — 99204 OFFICE O/P NEW MOD 45 MIN: CPT | Performed by: PHYSICIAN ASSISTANT

## 2025-07-02 PROCEDURE — G8417 CALC BMI ABV UP PARAM F/U: HCPCS | Performed by: PHYSICIAN ASSISTANT

## 2025-07-02 RX ORDER — OXYCODONE AND ACETAMINOPHEN 5; 325 MG/1; MG/1
1 TABLET ORAL EVERY 8 HOURS PRN
Qty: 28 TABLET | Refills: 0 | Status: SHIPPED | OUTPATIENT
Start: 2025-07-02 | End: 2025-07-16

## 2025-07-02 NOTE — PROGRESS NOTES
Patient seen in office today for:  right foot fib fx   Pain started: 6/28/25  The pain is located: The pain is along the side and up on the calf. Patient states it is very painful to walk. Patient wears boot for support and to help with pain.     DOI: 6/28/25   Patient has frequent numbing in the foot and tried to stand up from sitting on the edge of the bed and hurt a pop and snap sound when she stood.     DOS: 7/18/24    Patient reports 10 /10 pain.   RICE and medication are effective to alleviate pain and reduce swelling.   Pain worsened by: Patient reports painful ROM & weight bearing.     Patient interested physical therapy.     Patient is interested in speaking to provider about surgical option.     Profession: Disabled     Right handed   
   oxyCODONE-acetaminophen (PERCOCET) 5-325 MG per tablet     Sig: Take 1 tablet by mouth every 8 hours as needed for Pain for up to 14 days. Intended supply: 3 days. Take lowest dose possible to manage pain Max Daily Amount: 3 tablets     Dispense:  28 tablet     Refill:  0     Reduce doses taken as pain becomes manageable     Patient Instructions   [place] was given today in office.   Continue partial weight-bearing with crutch assistance.   Continue range of motion exercises as instructed.  Ice and elevate as needed.  Tylenol or Motrin for pain.  Follow up in 1 week for partial weightbearing xrays.    We are committed to providing you the best care possible.  If you receive a survey after visiting one of our offices, please take time to share your experience concerning your physician office visit.  These surveys are confidential and no health information about you is shared.  We are eager to improve for you and we are counting on your feedback to help make that happen.      LINWOOD HICKS  7/2/2025 1:44 PM

## 2025-07-02 NOTE — PATIENT INSTRUCTIONS
[place] was given today in office.   Continue partial weight-bearing with crutch assistance.   Continue range of motion exercises as instructed.  Ice and elevate as needed.  Tylenol or Motrin for pain.  Follow up in 1 week for partial weightbearing xrays.    We are committed to providing you the best care possible.  If you receive a survey after visiting one of our offices, please take time to share your experience concerning your physician office visit.  These surveys are confidential and no health information about you is shared.  We are eager to improve for you and we are counting on your feedback to help make that happen.

## 2025-07-08 ENCOUNTER — OFFICE VISIT (OUTPATIENT)
Dept: ORTHOPEDIC SURGERY | Age: 45
End: 2025-07-08
Payer: COMMERCIAL

## 2025-07-08 VITALS
OXYGEN SATURATION: 98 % | HEIGHT: 61 IN | HEART RATE: 82 BPM | BODY MASS INDEX: 33.04 KG/M2 | WEIGHT: 175 LBS | RESPIRATION RATE: 16 BRPM

## 2025-07-08 DIAGNOSIS — S82.831A CLOSED FRACTURE OF PROXIMAL END OF RIGHT FIBULA, UNSPECIFIED FRACTURE MORPHOLOGY, INITIAL ENCOUNTER: Primary | ICD-10-CM

## 2025-07-08 PROCEDURE — 99212 OFFICE O/P EST SF 10 MIN: CPT | Performed by: PHYSICIAN ASSISTANT

## 2025-07-08 PROCEDURE — 4004F PT TOBACCO SCREEN RCVD TLK: CPT | Performed by: PHYSICIAN ASSISTANT

## 2025-07-08 PROCEDURE — G8417 CALC BMI ABV UP PARAM F/U: HCPCS | Performed by: PHYSICIAN ASSISTANT

## 2025-07-08 PROCEDURE — G8427 DOCREV CUR MEDS BY ELIG CLIN: HCPCS | Performed by: PHYSICIAN ASSISTANT

## 2025-07-08 NOTE — PROGRESS NOTES
Patient seen in office today forClosed fracture of proximal end of right fibula   Patient reports 5/10 pain.  RICE and medication are effective to alleviate pain and reduce swelling.   Pain worsened by: Patient reports painful ROM & weight bearing.   Xrays performed in office today.

## 2025-07-08 NOTE — PROGRESS NOTES
ORTHOPEDIC SURGERY OFFICE NOTE  CHIEF COMPLAINT:  No chief complaint on file.    HISTORY OF PRESENT ILLNESS:  Devi Marte is a 45 y.o. female      (HPI)  45-year-old female presenting to the office today for 1 week follow-up for a proximal fibular fracture and a possible Maisonneuve fracture of the right lower extremity.  Patient has been in a long cam boot, and has been weightbearing as tolerated, does admit that her pain in the ankle has improved, still having some continual proximal fibular pain.  Returning back today to have stress view x-rays to evaluate for any syndesmotic widening or ankle joint widening.      Again patient's pain has improved, she still has some joint stiffness and pain upon wakening but she locates it more in the proximal fibula than into her ankle.  No new symptoms, no distal numbness tingling or weakness into the foot and/or ankle.  No open sores or wounds.    (Previous HPI) 45-year-old female presenting to the office today for ED follow-up for a proximal fibular fracture and a possible Maisonneuve fracture of the right lower extremity.  Patient states on 6/28 she was sitting on the edge of her bed when she scooted herself off and landed awkwardly on a box that was on the floor causing her foot/ankle to guanako, causing a popping sensation into the lower leg and ankle area.  She was then seen at the ED on 6/30 and was found to have this proximal fibular fracture, and concern for possible syndesmotic injury.  Had stress views performed in the emergency department through Dr. Montiel recommendation, and was to be seen  in follow-up today for further stress views of the right ankle in follow-up for the potential of stabilization/surgical management.    PAST MEDICAL HISTORY:  Past Medical History:   Diagnosis Date    Crack cocaine use     Fentanyl dependence (HCC)     Hep C w/o coma, chronic (HCC)     Hepatitis C antibody positive in blood 03/11/2023    Heroin abuse (HCC)     Heroin

## 2025-07-08 NOTE — PATIENT INSTRUCTIONS
Continue weight-bearing as tolerated.  Ice and elevate as needed.  Tylenol or Motrin for pain.  Follow up in 4 weeks.  Call office for any worsen pains    We are committed to providing you the best care possible.  If you receive a survey after visiting one of our offices, please take time to share your experience concerning your physician office visit.  These surveys are confidential and no health information about you is shared.  We are eager to improve for you and we are counting on your feedback to help make that happen.

## 2025-07-17 ENCOUNTER — TELEPHONE (OUTPATIENT)
Dept: ORTHOPEDIC SURGERY | Age: 45
End: 2025-07-17

## 2025-07-17 DIAGNOSIS — S82.831A CLOSED FRACTURE OF PROXIMAL END OF RIGHT FIBULA, UNSPECIFIED FRACTURE MORPHOLOGY, INITIAL ENCOUNTER: Primary | ICD-10-CM

## 2025-07-17 RX ORDER — OXYCODONE AND ACETAMINOPHEN 5; 325 MG/1; MG/1
1 TABLET ORAL EVERY 6 HOURS PRN
Qty: 28 TABLET | Refills: 0 | Status: SHIPPED | OUTPATIENT
Start: 2025-07-17 | End: 2025-07-18 | Stop reason: DRUGHIGH

## 2025-07-17 NOTE — TELEPHONE ENCOUNTER
Ansih will you please check your Rx    : Take 1 tablet by mouth every 6 hours as needed for Pain for up to 14 days. Intended supply: 3 days. Take lowest dose possible to manage pain Max Daily Amount: 4 tablets

## 2025-07-17 NOTE — TELEPHONE ENCOUNTER
Patient called back regarding her medication. Pharmacy is unable to fill due to there being a mistake in the prescription. Patient requested that we call the pharmacy to get this resolved.

## 2025-07-17 NOTE — TELEPHONE ENCOUNTER
Pt called in requesting pain meds to be sent to [facility], please call pt once its been sent [phone]

## 2025-07-18 DIAGNOSIS — S82.831A CLOSED FRACTURE OF PROXIMAL END OF RIGHT FIBULA, UNSPECIFIED FRACTURE MORPHOLOGY, INITIAL ENCOUNTER: ICD-10-CM

## 2025-07-18 RX ORDER — OXYCODONE AND ACETAMINOPHEN 5; 325 MG/1; MG/1
1 TABLET ORAL EVERY 6 HOURS PRN
Qty: 28 TABLET | Refills: 0 | Status: SHIPPED | OUTPATIENT
Start: 2025-07-18 | End: 2025-08-01

## 2025-08-05 ENCOUNTER — OFFICE VISIT (OUTPATIENT)
Dept: ORTHOPEDIC SURGERY | Age: 45
End: 2025-08-05
Payer: COMMERCIAL

## 2025-08-05 VITALS
HEIGHT: 61 IN | WEIGHT: 190.2 LBS | RESPIRATION RATE: 15 BRPM | BODY MASS INDEX: 35.91 KG/M2 | HEART RATE: 74 BPM | OXYGEN SATURATION: 97 %

## 2025-08-05 DIAGNOSIS — S82.831A CLOSED FRACTURE OF PROXIMAL END OF RIGHT FIBULA, UNSPECIFIED FRACTURE MORPHOLOGY, INITIAL ENCOUNTER: Primary | ICD-10-CM

## 2025-08-05 DIAGNOSIS — Z09 FOLLOW-UP EXAM: ICD-10-CM

## 2025-08-05 PROCEDURE — 99213 OFFICE O/P EST LOW 20 MIN: CPT | Performed by: PHYSICIAN ASSISTANT

## 2025-08-05 PROCEDURE — G8417 CALC BMI ABV UP PARAM F/U: HCPCS | Performed by: PHYSICIAN ASSISTANT

## 2025-08-05 PROCEDURE — G8427 DOCREV CUR MEDS BY ELIG CLIN: HCPCS | Performed by: PHYSICIAN ASSISTANT

## 2025-08-05 PROCEDURE — 4004F PT TOBACCO SCREEN RCVD TLK: CPT | Performed by: PHYSICIAN ASSISTANT

## (undated) DEVICE — TUBING, SUCTION, 9/32" X 10', STRAIGHT: Brand: MEDLINE

## (undated) DEVICE — GAUZE,PACKING STRIP,IODOFORM,1/2"X5YD,ST: Brand: CURAD

## (undated) DEVICE — TOWEL,OR,DSP,ST,BLUE,STD,6/PK,12PK/CS: Brand: MEDLINE

## (undated) DEVICE — ELECTRODE ES AD CRDLSS PT RET REM POLYHESIVE

## (undated) DEVICE — GLOVE SURG SZ 75 L12IN FNGR THK79MIL GRN LTX FREE

## (undated) DEVICE — STRIP WND PK W0.25INXL5YD IODO GZ TIGHTLY WVN CURAD

## (undated) DEVICE — YANKAUER,FLEXIBLE HANDLE,REGLR CAPACITY: Brand: MEDLINE INDUSTRIES, INC.

## (undated) DEVICE — DRAPE,UTILITY,XL,4/PK,STERILE: Brand: MEDLINE

## (undated) DEVICE — DRAPE,EXTREMITY,89X128,STERILE: Brand: MEDLINE

## (undated) DEVICE — BANDAGE,GAUZE,BULKEE II,4.5"X4.1YD,STRL: Brand: MEDLINE

## (undated) DEVICE — GLOVE SURG SZ 65 L12IN FNGR THK79MIL GRN LTX FREE

## (undated) DEVICE — SWAB CULT SGL AMIES W/O CHAR FOR THRT VAG SKIN HRT CULTSWAB

## (undated) DEVICE — SOLUTION ANSEP 3% PEROXIDE 8OZ TOP NS LF

## (undated) DEVICE — BANDAGE,ELASTIC,ESMARK,STERILE,4"X9',LF: Brand: MEDLINE

## (undated) DEVICE — SOLUTION PREP PAINT POV IOD FOR SKIN MUCOUS MEM

## (undated) DEVICE — SOLUTION IV 1000ML 0.9% SOD CHL FOR IRRIG PLAS CONT

## (undated) DEVICE — PREMIUM WET SKIN PREP TRAY: Brand: MEDLINE INDUSTRIES, INC.

## (undated) DEVICE — SPONGE GZ W4XL8IN COT WVN 12 PLY

## (undated) DEVICE — PACK,BASIC,SIRUS,V: Brand: MEDLINE

## (undated) DEVICE — NEEDLE HYPO 25GA L1.5IN BLU POLYPR HUB S STL REG BVL STR

## (undated) DEVICE — PAD,ABDOMINAL,5"X9",ST,LF,25/BX: Brand: MEDLINE INDUSTRIES, INC.

## (undated) DEVICE — TRAY PREP DRY W/ PREM GLV 2 APPL 6 SPNG 2 UNDPD 1 OVERWRAP

## (undated) DEVICE — BANDAGE,SELF ADHRNT,COFLEX,4"X5YD,STRL: Brand: COLABEL

## (undated) DEVICE — PENCIL ES CRD L10FT HND SWCHING ROCK SWCH W/ EDGE COAT BLDE

## (undated) DEVICE — GLOVE ORANGE PI 7   MSG9070

## (undated) DEVICE — INTENDED FOR TISSUE SEPARATION, AND OTHER PROCEDURES THAT REQUIRE A SHARP SURGICAL BLADE TO PUNCTURE OR CUT.: Brand: BARD-PARKER ® STAINLESS STEEL BLADES

## (undated) DEVICE — GLOVE SURG SZ 6 THK91MIL LTX FREE SYN POLYISOPRENE ANTI

## (undated) DEVICE — CONVERTORS STOCKINETTE: Brand: CONVERTORS

## (undated) DEVICE — TIBURON BILATERAL LIMB SHEET: Brand: CONVERTORS

## (undated) DEVICE — CONTAINER,SPECIMEN,OR STERILE,4OZ: Brand: MEDLINE

## (undated) DEVICE — DRESSING NEG PRSS SM 10X7.5X3.3CM POLYUR FOR WND THER VAC

## (undated) DEVICE — SYRINGE MED 10ML LUERLOCK TIP W/O SFTY DISP

## (undated) DEVICE — TUBE CULTURE LF UNIFORM BOTTOM STER

## (undated) DEVICE — PACK,BASIC,IX: Brand: MEDLINE

## (undated) DEVICE — MARKER SURG SKIN UTIL REGULAR/FINE 2 TIP W/ RUL AND 9 LBL

## (undated) DEVICE — Z INACTIVE USE 2863041 SPONGE GZ W4XL4IN 100% COT 16 PLY RADPQ HIGHLY ABSRB

## (undated) DEVICE — GLOVE SURG SZ 65 CRM LTX FREE POLYISOPRENE POLYMER BEAD ANTI

## (undated) DEVICE — SOLUTION IV IRRIG WATER 1000ML POUR BRL 2F7114

## (undated) DEVICE — NEEDLE,20GX1.5",BD,YALE,DISP,RG: Brand: MEDLINE

## (undated) DEVICE — SPONGE LAP W18XL18IN WHT COT 4 PLY FLD STRUNG RADPQ DISP ST 2 PER PACK

## (undated) DEVICE — COUNTER NDL 30 COUNT FOAM STRP SGL MAG

## (undated) DEVICE — FAN SPRAY KIT: Brand: PULSAVAC®

## (undated) DEVICE — DRESSING,GAUZE,XEROFORM,CURAD,1"X8",ST: Brand: CURAD

## (undated) DEVICE — SPONGE LAP W18XL18IN WHT COT 4 PLY FLD STRUNG RADPQ DISP ST

## (undated) DEVICE — GOWN,SIRUS,POLYRNF,BRTHSLV,XLN/XL,20/CS: Brand: MEDLINE

## (undated) DEVICE — SUTURE NONABSORBABLE MONOFILAMENT 4-0 FS-2 18 IN ETHILON 662H

## (undated) DEVICE — SYRINGE IRRIG 60ML SFT PLIABLE BLB EZ TO GRP 1 HND USE W/

## (undated) DEVICE — DRAPE SHEET ULTRAGARD: Brand: MEDLINE

## (undated) DEVICE — SHEET,DRAPE,53X77,STERILE: Brand: MEDLINE